# Patient Record
Sex: FEMALE | Race: WHITE | Employment: FULL TIME | URBAN - METROPOLITAN AREA
[De-identification: names, ages, dates, MRNs, and addresses within clinical notes are randomized per-mention and may not be internally consistent; named-entity substitution may affect disease eponyms.]

---

## 2017-01-21 ENCOUNTER — HOSPITAL ENCOUNTER (OUTPATIENT)
Dept: CT IMAGING | Age: 37
Discharge: HOME | End: 2017-01-21
Attending: OTOLARYNGOLOGY
Payer: COMMERCIAL

## 2017-01-21 DIAGNOSIS — J32.8 OTHER CHRONIC SINUSITIS: ICD-10-CM

## 2017-01-21 PROCEDURE — 70486 CT MAXILLOFACIAL W/O DYE: CPT

## 2017-01-28 ENCOUNTER — PATIENT MESSAGE (OUTPATIENT)
Dept: FAMILY MEDICINE CLINIC | Facility: CLINIC | Age: 37
End: 2017-01-28

## 2017-01-30 NOTE — TELEPHONE ENCOUNTER
From: Kerry Nelson  To: Charlene Sanchez MD  Sent: 1/28/2017 11:44 AM CST  Subject: Non-Urgent Medical Question    I'd like to schedule an appointment. Dr. Thai Vallejo next available appointment is 2/16. Is there anything sooner?  Preferably after 4 pm or a Sa

## 2017-02-02 ENCOUNTER — OFFICE VISIT (OUTPATIENT)
Dept: FAMILY MEDICINE CLINIC | Facility: CLINIC | Age: 37
End: 2017-02-02

## 2017-02-02 VITALS
HEIGHT: 67 IN | BODY MASS INDEX: 34.16 KG/M2 | DIASTOLIC BLOOD PRESSURE: 88 MMHG | RESPIRATION RATE: 16 BRPM | SYSTOLIC BLOOD PRESSURE: 130 MMHG | TEMPERATURE: 97 F | HEART RATE: 72 BPM | WEIGHT: 217.63 LBS

## 2017-02-02 DIAGNOSIS — E03.1 CONGENITAL HYPOTHYROIDISM WITHOUT GOITER: Chronic | ICD-10-CM

## 2017-02-02 DIAGNOSIS — J32.4 CHRONIC PANSINUSITIS: ICD-10-CM

## 2017-02-02 DIAGNOSIS — L29.9 ITCHING: ICD-10-CM

## 2017-02-02 DIAGNOSIS — H65.03 BILATERAL ACUTE SEROUS OTITIS MEDIA, RECURRENCE NOT SPECIFIED: Primary | ICD-10-CM

## 2017-02-02 DIAGNOSIS — Z00.00 LABORATORY EXAMINATION ORDERED AS PART OF A ROUTINE GENERAL MEDICAL EXAMINATION: ICD-10-CM

## 2017-02-02 PROCEDURE — 99214 OFFICE O/P EST MOD 30 MIN: CPT | Performed by: INTERNAL MEDICINE

## 2017-02-02 RX ORDER — AMOXICILLIN AND CLAVULANATE POTASSIUM 875; 125 MG/1; MG/1
1 TABLET, FILM COATED ORAL 2 TIMES DAILY
Qty: 20 TABLET | Refills: 0 | Status: SHIPPED | OUTPATIENT
Start: 2017-02-02 | End: 2017-02-12

## 2017-02-02 NOTE — PROGRESS NOTES
CC: Patient presents with: Follow - Up: sinus issues, went ENT ordered test, all are WNL. Not better. patient is on Sudafed       HPI:     Having a lot of sinus congestion.    Having a lot of itching through the legs and has been itching to the point t breath   CARDIOVASCULAR: denies chest pain  GI: denies abdominal pain    EXAM:   /88 mmHg  Pulse 72  Temp(Src) 97 °F (36.1 °C) (Temporal)  Resp 16  Ht 67\"  Wt 217 lb 9.6 oz  BMI 34.07 kg/m2  LMP 01/30/2017  GENERAL: well developed, well nourished,in epistaxis with chronic pseudphed use now having itching over bilateral legs  -refer to allergy     Congenital hypothyroidism without goiter  -repeat tsh and t4    Laboratory examination ordered as part of a routine general medical examination  -screening l

## 2017-02-06 ENCOUNTER — LAB ENCOUNTER (OUTPATIENT)
Dept: LAB | Age: 37
End: 2017-02-06
Attending: INTERNAL MEDICINE
Payer: COMMERCIAL

## 2017-02-06 DIAGNOSIS — Z00.00 LABORATORY EXAMINATION ORDERED AS PART OF A ROUTINE GENERAL MEDICAL EXAMINATION: ICD-10-CM

## 2017-02-06 DIAGNOSIS — E03.1 CONGENITAL HYPOTHYROIDISM WITHOUT GOITER: ICD-10-CM

## 2017-02-06 DIAGNOSIS — L29.9 ITCHING: ICD-10-CM

## 2017-02-06 LAB
25-HYDROXYVITAMIN D (TOTAL): 24.2 NG/ML (ref 30–100)
ALBUMIN SERPL-MCNC: 3.9 G/DL (ref 3.5–4.8)
ALP LIVER SERPL-CCNC: 98 U/L (ref 37–98)
ALT SERPL-CCNC: 33 U/L (ref 14–54)
AST SERPL-CCNC: 21 U/L (ref 15–41)
BASOPHILS # BLD AUTO: 0 X10(3) UL (ref 0–0.1)
BASOPHILS NFR BLD AUTO: 0 %
BILIRUB SERPL-MCNC: 0.2 MG/DL (ref 0.1–2)
BUN BLD-MCNC: 9 MG/DL (ref 8–20)
CALCIUM BLD-MCNC: 9.6 MG/DL (ref 8.3–10.3)
CHLORIDE: 104 MMOL/L (ref 101–111)
CO2: 27 MMOL/L (ref 22–32)
CREAT BLD-MCNC: 0.87 MG/DL (ref 0.55–1.02)
EOSINOPHIL # BLD AUTO: 0 X10(3) UL (ref 0–0.3)
EOSINOPHIL NFR BLD AUTO: 0 %
ERYTHROCYTE [DISTWIDTH] IN BLOOD BY AUTOMATED COUNT: 12.7 % (ref 11.5–16)
FREE T4: 1.3 NG/DL (ref 0.9–1.8)
GLUCOSE BLD-MCNC: 67 MG/DL (ref 70–99)
HAV AB SERPL IA-ACNC: 647 PG/ML (ref 193–986)
HCT VFR BLD AUTO: 43.2 % (ref 34–50)
HGB BLD-MCNC: 14.4 G/DL (ref 12–16)
IMMATURE GRANULOCYTE COUNT: 0.01 X10(3) UL (ref 0–1)
IMMATURE GRANULOCYTE RATIO %: 0.2 %
LYMPHOCYTES # BLD AUTO: 1.97 X10(3) UL (ref 0.9–4)
LYMPHOCYTES NFR BLD AUTO: 37.3 %
M PROTEIN MFR SERPL ELPH: 7.5 G/DL (ref 6.1–8.3)
MCH RBC QN AUTO: 30.1 PG (ref 27–33.2)
MCHC RBC AUTO-ENTMCNC: 33.3 G/DL (ref 31–37)
MCV RBC AUTO: 90.2 FL (ref 81–100)
MONOCYTES # BLD AUTO: 0.5 X10(3) UL (ref 0.1–0.6)
MONOCYTES NFR BLD AUTO: 9.5 %
NEUTROPHIL ABS PRELIM: 2.8 X10 (3) UL (ref 1.3–6.7)
NEUTROPHILS # BLD AUTO: 2.8 X10(3) UL (ref 1.3–6.7)
NEUTROPHILS NFR BLD AUTO: 53 %
PLATELET # BLD AUTO: 275 10(3)UL (ref 150–450)
POTASSIUM SERPL-SCNC: 4.2 MMOL/L (ref 3.6–5.1)
RBC # BLD AUTO: 4.79 X10(6)UL (ref 3.8–5.1)
RED CELL DISTRIBUTION WIDTH-SD: 41.9 FL (ref 35.1–46.3)
SODIUM SERPL-SCNC: 139 MMOL/L (ref 136–144)
TSI SER-ACNC: 0.75 MIU/ML (ref 0.35–5.5)
WBC # BLD AUTO: 5.3 X10(3) UL (ref 4–13)

## 2017-02-06 PROCEDURE — 36415 COLL VENOUS BLD VENIPUNCTURE: CPT

## 2017-02-06 PROCEDURE — 82607 VITAMIN B-12: CPT

## 2017-02-06 PROCEDURE — 84443 ASSAY THYROID STIM HORMONE: CPT

## 2017-02-06 PROCEDURE — 85025 COMPLETE CBC W/AUTO DIFF WBC: CPT

## 2017-02-06 PROCEDURE — 84439 ASSAY OF FREE THYROXINE: CPT

## 2017-02-06 PROCEDURE — 82306 VITAMIN D 25 HYDROXY: CPT

## 2017-02-06 PROCEDURE — 80053 COMPREHEN METABOLIC PANEL: CPT

## 2017-02-14 RX ORDER — ERGOCALCIFEROL 1.25 MG/1
50000 CAPSULE ORAL WEEKLY
Qty: 12 CAPSULE | Refills: 0 | Status: SHIPPED | OUTPATIENT
Start: 2017-02-14 | End: 2017-05-03

## 2017-02-14 RX ORDER — LEVOTHYROXINE SODIUM 175 UG/1
175 TABLET ORAL
Qty: 90 TABLET | Refills: 1 | Status: SHIPPED | OUTPATIENT
Start: 2017-02-14 | End: 2017-08-13

## 2017-03-31 ENCOUNTER — HOSPITAL ENCOUNTER (OUTPATIENT)
Dept: GENERAL RADIOLOGY | Age: 37
Discharge: HOME | End: 2017-03-31
Attending: PHYSICIAN ASSISTANT
Payer: COMMERCIAL

## 2017-03-31 ENCOUNTER — OFFICE VISIT (OUTPATIENT)
Dept: FAMILY MEDICINE CLINIC | Facility: CLINIC | Age: 37
End: 2017-03-31

## 2017-03-31 VITALS
BODY MASS INDEX: 34.53 KG/M2 | DIASTOLIC BLOOD PRESSURE: 80 MMHG | HEART RATE: 74 BPM | WEIGHT: 220 LBS | HEIGHT: 67 IN | SYSTOLIC BLOOD PRESSURE: 128 MMHG | TEMPERATURE: 98 F | RESPIRATION RATE: 16 BRPM

## 2017-03-31 DIAGNOSIS — M25.532 LEFT WRIST PAIN: Primary | ICD-10-CM

## 2017-03-31 DIAGNOSIS — M25.532 LEFT WRIST PAIN: ICD-10-CM

## 2017-03-31 PROCEDURE — 99213 OFFICE O/P EST LOW 20 MIN: CPT | Performed by: PHYSICIAN ASSISTANT

## 2017-03-31 PROCEDURE — 73110 X-RAY EXAM OF WRIST: CPT

## 2017-03-31 RX ORDER — METHYLPREDNISOLONE 4 MG/1
TABLET ORAL
Qty: 1 KIT | Refills: 0 | Status: SHIPPED | OUTPATIENT
Start: 2017-03-31 | End: 2017-07-09

## 2017-03-31 NOTE — PROGRESS NOTES
[facility] Internal Medicine Progress Note    CC:  Patient presents with:  Wrist Pain: L wrist, intermittent pain for 9 months      HPI:   HPI  L wrist pain x 9 months  She states last 4th of July she banged it into the kitchen chair  She didn't Constitutional: She is oriented to person, place, and time and well-developed, well-nourished, and in no distress. HENT:   Mouth/Throat: Oropharynx is clear and moist. No oropharyngeal exudate.    Cardiovascular: Normal rate, regular rhythm and normal hea

## 2017-03-31 NOTE — PATIENT INSTRUCTIONS
Thank you for choosing Teresa Moran PA-C at [place] [address]  To Do: Serina Mejia  1. Pt to begin medications as prescribed  2. Pt to purchase a thumb spica wrist splint  3. Pt to get xrays  4.  Pt to follow-up with ortho, referral given those potential risks and we strive to make you healthier and to improve your quality of life.     Referrals, and Radiology Information:    If your insurance requires a referral to a specialist, please allow 5 business days to process your referral request.

## 2017-04-02 NOTE — PROGRESS NOTES
Quick Note:    Borderline widening of bone spaces where pt is having pain, possibly due to old injury  Referral already given for ortho, pt should follow-up with ortho  ______

## 2017-04-04 PROBLEM — M65.4 DE QUERVAIN'S TENOSYNOVITIS, LEFT: Status: ACTIVE | Noted: 2017-04-04

## 2017-07-09 ENCOUNTER — OFFICE VISIT (OUTPATIENT)
Dept: FAMILY MEDICINE CLINIC | Facility: CLINIC | Age: 37
End: 2017-07-09

## 2017-07-09 VITALS
RESPIRATION RATE: 18 BRPM | TEMPERATURE: 98 F | SYSTOLIC BLOOD PRESSURE: 120 MMHG | HEIGHT: 67 IN | WEIGHT: 224 LBS | BODY MASS INDEX: 35.16 KG/M2 | HEART RATE: 86 BPM | DIASTOLIC BLOOD PRESSURE: 80 MMHG | OXYGEN SATURATION: 98 %

## 2017-07-09 DIAGNOSIS — T70.0XXA EAR BAROTRAUMA, INITIAL ENCOUNTER: Primary | ICD-10-CM

## 2017-07-09 PROCEDURE — 99213 OFFICE O/P EST LOW 20 MIN: CPT | Performed by: NURSE PRACTITIONER

## 2017-07-09 RX ORDER — NEOMYCIN SULFATE, POLYMYXIN B SULFATE, HYDROCORTISONE 3.5; 10000; 1 MG/ML; [USP'U]/ML; MG/ML
4 SOLUTION/ DROPS AURICULAR (OTIC) 4 TIMES DAILY
Qty: 1 BOTTLE | Refills: 0 | Status: SHIPPED | OUTPATIENT
Start: 2017-07-09 | End: 2017-07-16

## 2017-07-09 RX ORDER — AMOXICILLIN AND CLAVULANATE POTASSIUM 875; 125 MG/1; MG/1
1 TABLET, FILM COATED ORAL 2 TIMES DAILY
Qty: 20 TABLET | Refills: 0 | Status: SHIPPED | OUTPATIENT
Start: 2017-07-09 | End: 2017-07-17 | Stop reason: ALTCHOICE

## 2017-07-09 NOTE — PROGRESS NOTES
CHIEF COMPLAINT:   Patient presents with:  Ear Pain: Left ear pain down towards jaw, sinus pressure started yesterday      HPI:   Mary Alvarez is a 31year old female who presents to clinic today with complaints of left ear pain.  Has had for 1  da REVIEW OF SYSTEMS:   GENERAL: Feeling well otherwise. SKIN: no unusual skin lesions or rashes  HEENT: See HPI  LUNGS: No cough, shortness of breath, or wheezing. CARDIOVASCULAR: No chest pain, palpitations  GI: No N/V/C/D.   NEURO: denies headaches or d Risk and benefits of medication discussed. Stressed importance of completing full course of antibiotic. Tylenol/Motrin prn pain. Call or return if s/sx worsen, do not improve in 3 days, or if fever of 100.4 or greater persists for 72 hours.     Adriana Mascorro Symptoms can be mild to severe.  The most common symptoms of ear barotrauma may include:  · Feeling of pressure in the ear  · Ear pain  · Dizziness  · Feeling like you have a blocked ear  · Bleeding from the ears or into the middle ear  · Ringing in your ea

## 2017-07-09 NOTE — PATIENT INSTRUCTIONS
Understanding Ear Barotrauma  Ear barotrauma is a type of ear damage. It is caused by a difference in pressure between the inside of the ear and the air around you. It can cause pain and may lead to lasting hearing loss. It can harm the eardrum.  The eard Some events that cause ear barotrauma may also harm the lungs and sinuses. This can cause symptoms such as facial pain or shortness of breath. Diagnosing barotrauma  Your healthcare provider will ask about your health history and your symptoms.  You’ll be

## 2017-07-14 ENCOUNTER — OFFICE VISIT (OUTPATIENT)
Dept: FAMILY MEDICINE CLINIC | Facility: CLINIC | Age: 37
End: 2017-07-14

## 2017-07-14 VITALS
SYSTOLIC BLOOD PRESSURE: 130 MMHG | DIASTOLIC BLOOD PRESSURE: 88 MMHG | TEMPERATURE: 98 F | RESPIRATION RATE: 16 BRPM | HEIGHT: 67 IN | WEIGHT: 222 LBS | BODY MASS INDEX: 34.84 KG/M2 | HEART RATE: 84 BPM

## 2017-07-14 DIAGNOSIS — T70.29XD BAROTRAUMA, SUBSEQUENT ENCOUNTER: ICD-10-CM

## 2017-07-14 DIAGNOSIS — H92.02 EAR PAIN, LEFT: Primary | ICD-10-CM

## 2017-07-14 PROCEDURE — 99213 OFFICE O/P EST LOW 20 MIN: CPT | Performed by: NURSE PRACTITIONER

## 2017-07-14 RX ORDER — CEFDINIR 300 MG/1
300 CAPSULE ORAL 2 TIMES DAILY
Qty: 20 CAPSULE | Refills: 0 | Status: SHIPPED | OUTPATIENT
Start: 2017-07-14 | End: 2017-07-24

## 2017-07-14 NOTE — PROGRESS NOTES
[facility] Internal Medicine Office Note  Chief Complaint:   Patient presents with:  Ear Pain: from immediate care on 7/9 for left ear pain barotrauma, still in alot of pain     HPI:   This is a 31year old female coming in for  HPI     Was seen Osmotic Release (NIFEDICAL XL) 30 MG Oral Tablet 24 Hr Take 1 tablet (30 mg total) by mouth once daily. Disp: 90 tablet Rfl: 3         REVIEW OF SYSTEMS:   Review of Systems   Constitutional: Negative for chills, fatigue and fever.    HENT: Positive for con diagnosis)  Barotrauma, subsequent encounter    The plan is as follows  Ruiz Schumacher was seen today for ear pain.     Diagnoses and all orders for this visit:    Ear pain, left; Barotrauma, subsequent encounter  -     ENT - INTERNAL  -     cefdinir 300 MG Oral

## 2017-07-14 NOTE — PATIENT INSTRUCTIONS
Thank you for choosing [facility] V[facility] at [place] [address]  To Do: Luis Ramirez  1. Stop taking amoxicillin-clavu. and we are going to change to different abx   2.  Make appt with Dr. Seth Renee (ENT specialist)     Effective 6/19/17 until Nov discussed today.  All therapies have potential risk of harm or side effects or medication interactions.  It is your duty and for your safety to discuss with the pharmacist and our office with questions, and to notify us and stop treatment if problems arise

## 2017-07-19 RX ORDER — FLUTICASONE PROPIONATE 50 MCG
SPRAY, SUSPENSION (ML) NASAL
Qty: 1 BOTTLE | Refills: 2 | Status: SHIPPED | OUTPATIENT
Start: 2017-07-19 | End: 2017-08-30 | Stop reason: ALTCHOICE

## 2017-07-19 NOTE — TELEPHONE ENCOUNTER
Requesting Fluticason 50 mcg  LOV: 7/14/17 acute, 2/2/17   RTC: Return if symptoms worsen or fail to improve. Last Labs: n.a  Filled: 4/18/16 #1 btle with 3 refills    No future appointments.     Time started: 4:14 pm    Time ended: 4:16 pm    Total time

## 2017-08-15 RX ORDER — LEVOTHYROXINE SODIUM 175 UG/1
TABLET ORAL
Qty: 90 TABLET | Refills: 0 | Status: SHIPPED | OUTPATIENT
Start: 2017-08-15 | End: 2017-11-10

## 2017-08-15 NOTE — TELEPHONE ENCOUNTER
Requesting Levothyroxine 175mcg  LOV: 7/14/17  RTC: not noted  Last Labs: 2/6/17  Filled: 2/14/17 #90 with 1 refills    No future appointments.     Med passes protocol - refilled  Time started: 503    Time ended: 505    Total time spent on chart: 2min

## 2017-08-23 NOTE — TELEPHONE ENCOUNTER
Requesting Nifedical   LOV: 7/14/17 (acute)  Last NA: 7/9/16  RTC: 6 mo   Last Labs: pp  Filled: 8/22/16 #90 with 3 refills    No future appointments. Patient overdue for appt re: blood pressure. Can we reach out to patient to schedule?      Time started

## 2017-08-27 ENCOUNTER — APPOINTMENT (OUTPATIENT)
Dept: GENERAL RADIOLOGY | Age: 37
End: 2017-08-27
Attending: FAMILY MEDICINE
Payer: COMMERCIAL

## 2017-08-27 ENCOUNTER — HOSPITAL ENCOUNTER (OUTPATIENT)
Age: 37
Discharge: HOME | End: 2017-08-27
Attending: FAMILY MEDICINE
Payer: COMMERCIAL

## 2017-08-27 VITALS
DIASTOLIC BLOOD PRESSURE: 98 MMHG | RESPIRATION RATE: 18 BRPM | SYSTOLIC BLOOD PRESSURE: 134 MMHG | OXYGEN SATURATION: 99 % | HEART RATE: 101 BPM | TEMPERATURE: 98 F

## 2017-08-27 DIAGNOSIS — M54.12 CERVICAL RADICULAR PAIN: Primary | ICD-10-CM

## 2017-08-27 PROCEDURE — 96372 THER/PROPH/DIAG INJ SC/IM: CPT

## 2017-08-27 PROCEDURE — 99214 OFFICE O/P EST MOD 30 MIN: CPT

## 2017-08-27 PROCEDURE — 99204 OFFICE O/P NEW MOD 45 MIN: CPT

## 2017-08-27 PROCEDURE — 72050 X-RAY EXAM NECK SPINE 4/5VWS: CPT | Performed by: FAMILY MEDICINE

## 2017-08-27 RX ORDER — METHYLPREDNISOLONE 4 MG/1
TABLET ORAL
Qty: 1 PACKAGE | Refills: 0 | Status: SHIPPED | OUTPATIENT
Start: 2017-08-27 | End: 2017-11-20 | Stop reason: ALTCHOICE

## 2017-08-27 RX ORDER — CYCLOBENZAPRINE HCL 10 MG
10 TABLET ORAL 3 TIMES DAILY PRN
Qty: 15 TABLET | Refills: 0 | Status: SHIPPED | OUTPATIENT
Start: 2017-08-27 | End: 2017-09-03

## 2017-08-27 RX ORDER — HYDROCODONE BITARTRATE AND ACETAMINOPHEN 5; 325 MG/1; MG/1
1 TABLET ORAL EVERY 6 HOURS PRN
Qty: 10 TABLET | Refills: 0 | Status: SHIPPED | OUTPATIENT
Start: 2017-08-27 | End: 2017-08-30

## 2017-08-27 RX ORDER — KETOROLAC TROMETHAMINE 30 MG/ML
60 INJECTION, SOLUTION INTRAMUSCULAR; INTRAVENOUS ONCE
Status: COMPLETED | OUTPATIENT
Start: 2017-08-27 | End: 2017-08-27

## 2017-08-27 RX ORDER — NYSTATIN 100000 U/G
1 CREAM TOPICAL 2 TIMES DAILY
Qty: 15 G | Refills: 0 | Status: SHIPPED | OUTPATIENT
Start: 2017-08-27 | End: 2017-08-28

## 2017-08-27 NOTE — ED INITIAL ASSESSMENT (HPI)
C/O left sided neck pain that radiates down left arm that started 2 days ago. Gets some relief from OTC Aleve.

## 2017-08-28 ENCOUNTER — TELEPHONE (OUTPATIENT)
Dept: FAMILY MEDICINE CLINIC | Facility: CLINIC | Age: 37
End: 2017-08-28

## 2017-08-28 DIAGNOSIS — M54.2 NECK PAIN: Primary | ICD-10-CM

## 2017-08-28 NOTE — TELEPHONE ENCOUNTER
Requesting referral to [facility] -Dr Frankey Bjork for neck pain  LOV: 7/14/17 acute, 02/02/17   RTC: return if symptoms worsen  Referral pended if okay    Patient transferred to the  to schedule OV due to being overdue for appt for

## 2017-08-28 NOTE — ED PROVIDER NOTES
Patient Seen in: [facility]    History   Patient presents with:  Neck Pain (musculoskeletal, neurologic)    Stated Complaint: neck pain, 3days    HPI    31year old female presents for neck pain.  States she has left sided neck p Breast       Smoking status: Never Smoker                                                              Alcohol use: No                Review of Systems    Positive for stated complaint: neck pain, 3days  Other systems are as noted in HPI.   Constitutional a are patent bilaterally. No prevertebral soft tissue swelling. Incidentally noted are cervical ribs.  Advised to apply heat packs  Medrol dose pack and Flexeril as prescribed  Norco as needed for severe pain  Follow up with PCP if not better           Dispos

## 2017-08-28 NOTE — TELEPHONE ENCOUNTER
.Reason for the order/referral: referral to chiropractor   PCP: Paola@[employer] Dr. Tono Patel   Refer to Provider (first and last name) seeking chiropractor   Specialty: rehab   Patient Insurance: Payor: Dyllan Manzanares / [facility] / Product Type: HMO /

## 2017-08-30 ENCOUNTER — OFFICE VISIT (OUTPATIENT)
Dept: FAMILY MEDICINE CLINIC | Facility: CLINIC | Age: 37
End: 2017-08-30

## 2017-08-30 VITALS
SYSTOLIC BLOOD PRESSURE: 130 MMHG | RESPIRATION RATE: 16 BRPM | BODY MASS INDEX: 34.69 KG/M2 | TEMPERATURE: 98 F | DIASTOLIC BLOOD PRESSURE: 90 MMHG | HEIGHT: 67 IN | HEART RATE: 80 BPM | WEIGHT: 221 LBS

## 2017-08-30 DIAGNOSIS — I10 HYPERTENSION, UNSPECIFIED TYPE: Primary | ICD-10-CM

## 2017-08-30 DIAGNOSIS — E03.1 CONGENITAL HYPOTHYROIDISM WITHOUT GOITER: Chronic | ICD-10-CM

## 2017-08-30 DIAGNOSIS — E55.9 VITAMIN D DEFICIENCY: ICD-10-CM

## 2017-08-30 PROCEDURE — 99213 OFFICE O/P EST LOW 20 MIN: CPT | Performed by: NURSE PRACTITIONER

## 2017-08-30 RX ORDER — NIFEDIPINE 30 MG/1
TABLET, EXTENDED RELEASE ORAL
Qty: 90 TABLET | Refills: 1 | Status: SHIPPED | OUTPATIENT
Start: 2017-08-30 | End: 2018-02-16

## 2017-08-30 NOTE — PROGRESS NOTES
[facility] Internal Medicine Office Note  Chief Complaint:   Patient presents with:  Blood Pressure: ran out of med yesterday.   Medication Request: Nifedical refill    HPI:   This is a 31year old female coming in for  HPI  Here today for BP medi Respiratory: Negative for cough and shortness of breath. Cardiovascular: Negative for chest pain. Gastrointestinal: Negative for abdominal pain. Genitourinary: Negative. Musculoskeletal: Negative. Skin: Negative.     Neurological: Negative fo DAILY    Congenital hypothyroidism without goiter  Vitamin D deficiency  -     TSH W REFLEX TO FREE T4; Future  -     VITAMIN D, 25-HYDROXY; Future    1.labs today    Orders Placed This Encounter      Vitamin D, 25-Hydroxy      TSH W REFLEX TO FREE T4

## 2017-08-30 NOTE — PATIENT INSTRUCTIONS
Thank you for choosing [facility] Ni at [place] [address]  To Do: Mercy Elaine  1. Refill given  2. Can get labs done during the next couple weeks     Effective 6/19/17 until November 2017  Due to [employer] is being moved. or side effects or medication interactions.  It is your duty and for your safety to discuss with the pharmacist and our office with questions, and to notify us and stop treatment if problems arise, but know that our intention is that the benefits outweigh

## 2017-08-31 ENCOUNTER — TELEPHONE (OUTPATIENT)
Dept: FAMILY MEDICINE CLINIC | Facility: CLINIC | Age: 37
End: 2017-08-31

## 2017-08-31 DIAGNOSIS — M54.2 CERVICALGIA: ICD-10-CM

## 2017-08-31 DIAGNOSIS — M54.2 NECK PAIN: Primary | ICD-10-CM

## 2017-08-31 NOTE — TELEPHONE ENCOUNTER
Hello,       This provider is not in network. Member should be utilizing in network providers. If member has seen in network provider, ALL referrals require clinical supporting requests. ALL out of network requests require medical necessity.  Please be advi

## 2017-09-01 NOTE — TELEPHONE ENCOUNTER
Patient states that she contacted [facility] and they told her that they can not disclose that information, states that recommendation comes from the doctor.      Pt would like for Dr Joanne Rodriguez to make a recommendation for a chiropractor, pt states she saw Sheila tristan

## 2017-09-01 NOTE — TELEPHONE ENCOUNTER
Patient returned call and was informed of this. She was given number to [facility] to contact and find out who is in plan and she will call us with name to pend order.     Time started: 330    Time ended: 331    Total time spent on chart: one min

## 2017-09-05 NOTE — TELEPHONE ENCOUNTER
Time started: 3:05pm     Time ended: 3:07pm    Total time spent on chart: 2 min    Patient aware that [facility] is only allowing PT at this time. Ordered entered for PT and Polina Yanez PT number given for patient to schedule.

## 2017-09-05 NOTE — TELEPHONE ENCOUNTER
LMOM for patient to call back and discuss.      Time started: 1502    Time ended: 1423    Total time spent on chart: 1 min

## 2017-10-31 ENCOUNTER — OFFICE VISIT (OUTPATIENT)
Dept: FAMILY MEDICINE CLINIC | Facility: CLINIC | Age: 37
End: 2017-10-31

## 2017-10-31 VITALS
HEART RATE: 89 BPM | TEMPERATURE: 99 F | BODY MASS INDEX: 34.37 KG/M2 | DIASTOLIC BLOOD PRESSURE: 88 MMHG | WEIGHT: 219 LBS | OXYGEN SATURATION: 98 % | HEIGHT: 67 IN | RESPIRATION RATE: 16 BRPM | SYSTOLIC BLOOD PRESSURE: 128 MMHG

## 2017-10-31 DIAGNOSIS — R51.9 SINUS HEADACHE: ICD-10-CM

## 2017-10-31 DIAGNOSIS — J01.00 ACUTE MAXILLARY SINUSITIS, RECURRENCE NOT SPECIFIED: Primary | ICD-10-CM

## 2017-10-31 PROCEDURE — 99213 OFFICE O/P EST LOW 20 MIN: CPT | Performed by: NURSE PRACTITIONER

## 2017-10-31 RX ORDER — FLUTICASONE PROPIONATE 50 MCG
2 SPRAY, SUSPENSION (ML) NASAL DAILY
Qty: 1 BOTTLE | Refills: 3 | Status: SHIPPED | OUTPATIENT
Start: 2017-10-31 | End: 2018-03-30

## 2017-10-31 RX ORDER — AMOXICILLIN AND CLAVULANATE POTASSIUM 875; 125 MG/1; MG/1
1 TABLET, FILM COATED ORAL 2 TIMES DAILY
Qty: 20 TABLET | Refills: 0 | Status: SHIPPED | OUTPATIENT
Start: 2017-10-31 | End: 2017-11-10

## 2017-10-31 NOTE — PATIENT INSTRUCTIONS
Sinus Headaches     When using nasal spray, keep your chin down and angle the spray away from center. Sinus headaches can cause a gnawing pain behind the nose and eyes. The pain most often gets worse in the afternoon and evening.  You may also run a f Cilia keep sinuses clear    Air circulates freely though healthy sinuses. Tiny, hairlike structures called cilia line the sinuses. Cilia move the thin, watery mucus through the sinuses and into the nose. Sinuses are healthy when they drain freely.  Sinus dr · Over-the-counter decongestants may be used unless a similar medicine was prescribed. Nasal sprays work the fastest. Use one that contains phenylephrine or oxymetazoline. First blow the nose gently. Then use the spray.  Do not use these medicines more ofte © 8113-6687 [address]. [address] [facility] [place]. All rights reserved. This information is not intended as a substitute for professional medical care. Always follow your healthcare professional's instructions.

## 2017-10-31 NOTE — PROGRESS NOTES
Aureliano Bingham is a 31year old female. Patient presents with:  Cold: congestion,sinus pressure,headache,cough sx x 3 days.      HPI:    Symptoms started  3-4 days ago with purulent nasal discharge, maxillary sinus pressure, and headache, a lot of 128/88 (BP Location: Right arm, Patient Position: Sitting, Cuff Size: large)   Pulse 89   Temp 98.6 °F (37 °C) (Oral)   Resp 16   Ht 67\"   Wt 219 lb   LMP 10/10/2017   SpO2 98%   BMI 34.30 kg/m²   General: WD/WN in no acute distress.    Eyes & ears: conjun

## 2017-11-13 RX ORDER — ESTAZOLAM 2 MG/1
TABLET ORAL
Qty: 63 TABLET | Refills: 2 | Status: SHIPPED | OUTPATIENT
Start: 2017-11-13 | End: 2018-03-30 | Stop reason: SINTOL

## 2017-11-13 RX ORDER — LEVOTHYROXINE SODIUM 175 UG/1
TABLET ORAL
Qty: 90 TABLET | Refills: 0 | Status: SHIPPED | OUTPATIENT
Start: 2017-11-13 | End: 2018-02-16

## 2017-11-13 NOTE — TELEPHONE ENCOUNTER
Requesting Levothyroxine 175mcg daily  LOV: 8/30/17  RTC: 6 months  Last Relevant Labs: 2/6/17  Filled: 8/15/17 #90 with 0 refills    Future Appointments  Date Time Provider Sidney Nereida   12/12/2017 5:00 PM MD MYLES Benítez [facility]   1/5

## 2017-11-21 PROBLEM — F33.2 SEVERE RECURRENT MAJOR DEPRESSION WITHOUT PSYCHOTIC FEATURES (HCC): Status: ACTIVE | Noted: 2017-11-21

## 2017-11-28 PROBLEM — F41.1 GAD (GENERALIZED ANXIETY DISORDER): Status: ACTIVE | Noted: 2017-11-28

## 2018-02-15 ENCOUNTER — PATIENT MESSAGE (OUTPATIENT)
Dept: INTERNAL MEDICINE CLINIC | Facility: CLINIC | Age: 38
End: 2018-02-15

## 2018-02-15 RX ORDER — LEVOTHYROXINE SODIUM 175 UG/1
TABLET ORAL
Qty: 90 TABLET | Refills: 0 | OUTPATIENT
Start: 2018-02-15

## 2018-02-15 NOTE — TELEPHONE ENCOUNTER
From: Ayaan Gaffney  To: Jessie Carvalho MD  Sent: 2/15/2018 12:17 PM CST  Subject: Prescription Question    I understand that Dr. Tirso Lacy has left the practice. I'm in the process of finding a new primary care doctor.  However, I am out of my Synthroid that

## 2018-02-16 ENCOUNTER — OFFICE VISIT (OUTPATIENT)
Dept: FAMILY MEDICINE CLINIC | Facility: CLINIC | Age: 38
End: 2018-02-16

## 2018-02-16 VITALS
WEIGHT: 223 LBS | TEMPERATURE: 98 F | RESPIRATION RATE: 16 BRPM | HEIGHT: 67 IN | DIASTOLIC BLOOD PRESSURE: 76 MMHG | BODY MASS INDEX: 35 KG/M2 | OXYGEN SATURATION: 99 % | HEART RATE: 102 BPM | SYSTOLIC BLOOD PRESSURE: 118 MMHG

## 2018-02-16 DIAGNOSIS — E03.1 CONGENITAL HYPOTHYROIDISM WITHOUT GOITER: Primary | Chronic | ICD-10-CM

## 2018-02-16 DIAGNOSIS — F33.2 SEVERE RECURRENT MAJOR DEPRESSION WITHOUT PSYCHOTIC FEATURES (HCC): ICD-10-CM

## 2018-02-16 DIAGNOSIS — F41.1 GAD (GENERALIZED ANXIETY DISORDER): ICD-10-CM

## 2018-02-16 DIAGNOSIS — I10 ESSENTIAL HYPERTENSION: ICD-10-CM

## 2018-02-16 PROCEDURE — 96127 BRIEF EMOTIONAL/BEHAV ASSMT: CPT | Performed by: PHYSICIAN ASSISTANT

## 2018-02-16 PROCEDURE — 99214 OFFICE O/P EST MOD 30 MIN: CPT | Performed by: PHYSICIAN ASSISTANT

## 2018-02-16 RX ORDER — LEVOTHYROXINE SODIUM 175 UG/1
TABLET ORAL
Qty: 90 TABLET | Refills: 1 | Status: SHIPPED | OUTPATIENT
Start: 2018-02-16 | End: 2018-08-20

## 2018-02-16 RX ORDER — NIFEDIPINE 30 MG/1
TABLET, EXTENDED RELEASE ORAL
Qty: 90 TABLET | Refills: 1 | Status: SHIPPED | OUTPATIENT
Start: 2018-02-16 | End: 2018-03-26

## 2018-02-16 NOTE — PATIENT INSTRUCTIONS
Thank you for choosing Cinthia Riley PA-C at [place] [address]  To Do: Shakira Miranda  1. Pt to continue medications  2. Get lab work done  3.  Follow-up in 6 months    • Please signup for MY CHART, which is electronic access to your record if y please call Central Scheduling at [phone]  Please allow our office 5 business days to contact you regarding any testing results.     Refill policies:   Allow 3 business days for refills; controlled substances may take longer and must be picked up from

## 2018-02-16 NOTE — PROGRESS NOTES
[facility] Internal Medicine Progress Note    CC:  Patient presents with:  Thyroid Problem: Levothyroxine-thyroid labs due  Hypertension: HTN-Nifedipine ER  Depression: Overdue health maintenance- pt does see weekly someone at [address] [place] [address] for dysphoric mood. Negative for self-injury, sleep disturbance and suicidal ideas. The patient is nervous/anxious.          Following with psych         /76 (BP Location: Right arm, Patient Position: Sitting, Cuff Size: adult)   Pulse 102   Temp 98 ° [E]      Comp Metabolic Panel (14) [E]      Lipid Panel [E]      TSH and Free T4 [E]      Vitamin B12 [E]      Vitamin D, 25-Hydroxy [E]    Meds & Refills for this Visit:  Signed Prescriptions Disp Refills    NIFEdipine ER Osmotic Release (NIFEDICAL XL) 30

## 2018-03-01 ENCOUNTER — OFFICE VISIT (OUTPATIENT)
Dept: FAMILY MEDICINE CLINIC | Facility: CLINIC | Age: 38
End: 2018-03-01

## 2018-03-01 VITALS
BODY MASS INDEX: 35 KG/M2 | HEART RATE: 94 BPM | DIASTOLIC BLOOD PRESSURE: 72 MMHG | TEMPERATURE: 98 F | WEIGHT: 223 LBS | OXYGEN SATURATION: 99 % | SYSTOLIC BLOOD PRESSURE: 124 MMHG | RESPIRATION RATE: 20 BRPM

## 2018-03-01 DIAGNOSIS — J01.00 ACUTE MAXILLARY SINUSITIS, RECURRENCE NOT SPECIFIED: Primary | ICD-10-CM

## 2018-03-01 PROCEDURE — 99213 OFFICE O/P EST LOW 20 MIN: CPT | Performed by: NURSE PRACTITIONER

## 2018-03-01 RX ORDER — METHYLPREDNISOLONE 4 MG/1
TABLET ORAL
Qty: 1 KIT | Refills: 0 | Status: SHIPPED | OUTPATIENT
Start: 2018-03-01 | End: 2018-03-07

## 2018-03-01 NOTE — PATIENT INSTRUCTIONS
Humidifier in room  Sleep propped  Push fluids  Limit dairy  Mucinex as directed (does not affect blood pressure)  Benadryl at night  Steroids as prescribed  Follow up in 2 days if no improvement or worsening symptoms    Sinusitis (No Antibiotics)    The · Use acetaminophen or ibuprofen to control pain, unless another pain medicine was prescribed. (If you have chronic liver or kidney disease or ever had a stomach ulcer, talk with your doctor before using these medicines.  Aspirin should never be used in any

## 2018-03-01 NOTE — PROGRESS NOTES
CHIEF COMPLAINT:   Patient presents with:  Nasal Congestion: sinus pressure,post nasal drip and sore throat x 7 days      HPI:   Sona Bailey is a 30year old female who presents for cold symptoms for  7  days.  Symptoms have progressed into sinus co Smoking status: Never Smoker                                                              Smokeless tobacco: Never Used                      Alcohol use: Yes           0.0 oz/week     Comment: very rare        REVIEW OF SYSTEMS:   GENERAL:  denies  diminis methylPREDNISolone (MEDROL) 4 MG Oral Tablet Therapy Pack 1 kit 0      Sig: As directed. Risks, benefits, side effects of medication addressed and explained.     Patient Instructions     Humidifier in room  Sleep propped  Push fluids  Limit dairy · Over-the-counter decongestants may be used unless a similar medicine was prescribed. Nasal sprays work the fastest. Use one that contains phenylephrine or oxymetazoline. First blow the nose gently. Then use the spray.  Do not use these medicines more ofte © 5811-9392 [address]. 140[address] [facility] [place]. All rights reserved. This information is not intended as a substitute for professional medical care. Always follow your healthcare professional's instructions.             The

## 2018-03-06 ENCOUNTER — APPOINTMENT (OUTPATIENT)
Dept: CT IMAGING | Age: 38
End: 2018-03-06
Attending: EMERGENCY MEDICINE
Payer: COMMERCIAL

## 2018-03-06 ENCOUNTER — HOSPITAL ENCOUNTER (EMERGENCY)
Age: 38
Discharge: HOME | End: 2018-03-06
Attending: EMERGENCY MEDICINE
Payer: COMMERCIAL

## 2018-03-06 VITALS
SYSTOLIC BLOOD PRESSURE: 140 MMHG | HEIGHT: 67 IN | RESPIRATION RATE: 16 BRPM | HEART RATE: 84 BPM | WEIGHT: 220 LBS | DIASTOLIC BLOOD PRESSURE: 96 MMHG | BODY MASS INDEX: 34.53 KG/M2 | TEMPERATURE: 98 F | OXYGEN SATURATION: 98 %

## 2018-03-06 DIAGNOSIS — G43.809 OTHER MIGRAINE WITHOUT STATUS MIGRAINOSUS, NOT INTRACTABLE: Primary | ICD-10-CM

## 2018-03-06 PROCEDURE — 70450 CT HEAD/BRAIN W/O DYE: CPT | Performed by: EMERGENCY MEDICINE

## 2018-03-06 PROCEDURE — 96375 TX/PRO/DX INJ NEW DRUG ADDON: CPT

## 2018-03-06 PROCEDURE — 99284 EMERGENCY DEPT VISIT MOD MDM: CPT

## 2018-03-06 PROCEDURE — 96361 HYDRATE IV INFUSION ADD-ON: CPT

## 2018-03-06 PROCEDURE — 96374 THER/PROPH/DIAG INJ IV PUSH: CPT

## 2018-03-06 RX ORDER — SODIUM CHLORIDE 9 MG/ML
INJECTION, SOLUTION INTRAVENOUS ONCE
Status: COMPLETED | OUTPATIENT
Start: 2018-03-06 | End: 2018-03-06

## 2018-03-06 RX ORDER — HYDROMORPHONE HYDROCHLORIDE 1 MG/ML
0.5 INJECTION, SOLUTION INTRAMUSCULAR; INTRAVENOUS; SUBCUTANEOUS EVERY 30 MIN PRN
Status: DISCONTINUED | OUTPATIENT
Start: 2018-03-06 | End: 2018-03-06

## 2018-03-06 RX ORDER — BUTALBITAL, ACETAMINOPHEN AND CAFFEINE 50; 325; 40 MG/1; MG/1; MG/1
1-2 TABLET ORAL EVERY 4 HOURS PRN
Qty: 20 TABLET | Refills: 0 | Status: SHIPPED | OUTPATIENT
Start: 2018-03-06 | End: 2018-03-13

## 2018-03-06 RX ORDER — METOCLOPRAMIDE HYDROCHLORIDE 5 MG/ML
10 INJECTION INTRAMUSCULAR; INTRAVENOUS ONCE
Status: COMPLETED | OUTPATIENT
Start: 2018-03-06 | End: 2018-03-06

## 2018-03-06 RX ORDER — KETOROLAC TROMETHAMINE 30 MG/ML
30 INJECTION, SOLUTION INTRAMUSCULAR; INTRAVENOUS ONCE
Status: COMPLETED | OUTPATIENT
Start: 2018-03-06 | End: 2018-03-06

## 2018-03-06 RX ORDER — DIPHENHYDRAMINE HYDROCHLORIDE 50 MG/ML
25 INJECTION INTRAMUSCULAR; INTRAVENOUS ONCE
Status: COMPLETED | OUTPATIENT
Start: 2018-03-06 | End: 2018-03-06

## 2018-03-06 NOTE — ED INITIAL ASSESSMENT (HPI)
States was diagnosed with sinus infection on Thursday-went to walk in clinic and was given antibiotic and steroids. Today c/o left sided migraine headache and nausea/vomiting. Been taking sudafed.

## 2018-03-06 NOTE — ED PROVIDER NOTES
[facility] [place]    History   Patient presents with:  Headache (neurologic)    Stated Complaint: sinus infection onset thursday, seen in [facility] on meds now with migraine and worse     HPI    29-year-old female comes %  O2 Device: None (Room air)    Current:/96   Pulse 84   Temp 97.6 °F (36.4 °C) (Temporal)   Resp 16   Ht 170.2 cm (5' 7\")   Wt 99.8 kg   LMP 02/27/2018   SpO2 98%   BMI 34.46 kg/m²         Physical Exam    HEENT : NCAT, EOMI, PEERL, throat clear, sulci are appropriate for the patient's age. No mass effect. Visualized portions of paranasal sinuses are unremarkable. Visualized portions of mastoid air cells are unremarkable. Visualized portions of orbits are unremarkable.   IMPRESSION: Unremarkable

## 2018-03-07 ENCOUNTER — OFFICE VISIT (OUTPATIENT)
Dept: FAMILY MEDICINE CLINIC | Facility: CLINIC | Age: 38
End: 2018-03-07

## 2018-03-07 VITALS
HEIGHT: 67 IN | HEART RATE: 88 BPM | DIASTOLIC BLOOD PRESSURE: 90 MMHG | BODY MASS INDEX: 34.37 KG/M2 | SYSTOLIC BLOOD PRESSURE: 130 MMHG | TEMPERATURE: 98 F | RESPIRATION RATE: 16 BRPM | WEIGHT: 219 LBS

## 2018-03-07 DIAGNOSIS — G43.909 MIGRAINE WITHOUT STATUS MIGRAINOSUS, NOT INTRACTABLE, UNSPECIFIED MIGRAINE TYPE: Primary | ICD-10-CM

## 2018-03-07 DIAGNOSIS — R11.0 NAUSEA: ICD-10-CM

## 2018-03-07 PROCEDURE — 99214 OFFICE O/P EST MOD 30 MIN: CPT | Performed by: FAMILY MEDICINE

## 2018-03-07 RX ORDER — RIZATRIPTAN BENZOATE 10 MG/1
10 TABLET ORAL AS NEEDED
Qty: 8 TABLET | Refills: 0 | Status: SHIPPED | OUTPATIENT
Start: 2018-03-07 | End: 2018-04-06

## 2018-03-07 RX ORDER — ONDANSETRON 4 MG/1
4 TABLET, FILM COATED ORAL EVERY 8 HOURS PRN
Qty: 20 TABLET | Refills: 0 | Status: SHIPPED | OUTPATIENT
Start: 2018-03-07 | End: 2018-03-30

## 2018-03-07 NOTE — PROGRESS NOTES
HPI:   Eduarda Grant is a 30year old female that presents for emergency room follow-up. She was seen for severe left-sided migraine. She had negative CT scan and improved with Fioricet and was discharged home.   She states that her headache has con hours if needed. , Disp: 8 tablet, Rfl: 0  •  Ondansetron HCl (ZOFRAN) 4 mg tablet, Take 1 tablet (4 mg total) by mouth every 8 (eight) hours as needed for Nausea., Disp: 20 tablet, Rfl: 0  •  Butalbital-APAP-Caffeine -40 MG Oral Tab, Take 1-2 tablets conjunctivae clear bilaterally, no eye discharge    Ears: External normal. TMs normal without erythema or effusion   Nose: patent, no nasal discharge    Throat:  No tonsillar erythema or exudate. Mouth:  No oral lesions or ulcerations, good dentition. needed for Nausea. Dispense: 20 tablet; Refill: 0      Risks, benefits, and alternatives of current treatment plan discussed in detail. Questions and concerns addressed. Red flags to RTC or ED reviewed. Patient (or parent) agrees to plan.       Return fo

## 2018-03-07 NOTE — PATIENT INSTRUCTIONS
Migraine  Rizatriptan 10 mg orally as needed. Can repeat dose in 2 hours if not improved. Do not exceed 30 mg in 24 hours.     Nausea  Zofran 4 mg every 8 hours as needed    Complete medrol dose pack    Follow up if not improving        What Are Migraine experience these symptoms, you should immediately seek medical attention because you could be having a stroke. Is it a tension headache? This type of headache is usually a dull ache or a sensation of pressure on both sides of the head.  It may be associat

## 2018-03-08 ENCOUNTER — TELEPHONE (OUTPATIENT)
Dept: FAMILY MEDICINE CLINIC | Facility: CLINIC | Age: 38
End: 2018-03-08

## 2018-03-08 PROBLEM — G43.109 MIGRAINE WITH AURA: Status: ACTIVE | Noted: 2018-03-08

## 2018-03-08 NOTE — TELEPHONE ENCOUNTER
Patient  states that medication for migraines is not working, states she woke up this morning with a bad migraine and she can not get up or do anything. Pts  is wondering if there is an alternative medication she can try.  Advised that if symp

## 2018-03-08 NOTE — TELEPHONE ENCOUNTER
Patient's medication is not working at [employer] work, cant drive, doing any normal tasks is extremely painful.

## 2018-03-09 ENCOUNTER — TELEPHONE (OUTPATIENT)
Dept: FAMILY MEDICINE CLINIC | Facility: CLINIC | Age: 38
End: 2018-03-09

## 2018-03-09 DIAGNOSIS — G43.119 INTRACTABLE MIGRAINE WITH AURA WITHOUT STATUS MIGRAINOSUS: Primary | ICD-10-CM

## 2018-03-09 NOTE — TELEPHONE ENCOUNTER
If migraine has not improved and still 8/10 with Butalbital, Steroid, Triptan, Zofran and stopping estrogen containing birth control pill, she should go to the Emergency Department, may need MRI or IV meds and BP check.   She should also follow up with Bobby Waddell

## 2018-03-09 NOTE — TELEPHONE ENCOUNTER
I called patient to discuss. I told her per previous message she was advised to go to ER. Patient said she will not go to ER. She had gone to ER and then f/u here and was given medication (Maxalt) which is not helping her migraine.   She has current pain

## 2018-03-09 NOTE — TELEPHONE ENCOUNTER
PT was seen on Wednesday for ER f/u for migraines. Marina prescribed medication which she feels is not working because she has been in bed for the past 5 days. She needs something else to help her with the migraines.     Please advise pt at [phone]

## 2018-03-10 ENCOUNTER — HOSPITAL ENCOUNTER (EMERGENCY)
Facility: HOSPITAL | Age: 38
Discharge: HOME | End: 2018-03-10
Attending: EMERGENCY MEDICINE
Payer: COMMERCIAL

## 2018-03-10 VITALS
HEIGHT: 67 IN | RESPIRATION RATE: 18 BRPM | WEIGHT: 220 LBS | OXYGEN SATURATION: 96 % | TEMPERATURE: 98 F | BODY MASS INDEX: 34.53 KG/M2 | DIASTOLIC BLOOD PRESSURE: 101 MMHG | SYSTOLIC BLOOD PRESSURE: 144 MMHG | HEART RATE: 88 BPM

## 2018-03-10 DIAGNOSIS — G43.109 MIGRAINE WITH AURA AND WITHOUT STATUS MIGRAINOSUS, NOT INTRACTABLE: Primary | ICD-10-CM

## 2018-03-10 PROCEDURE — 99285 EMERGENCY DEPT VISIT HI MDM: CPT

## 2018-03-10 PROCEDURE — 96374 THER/PROPH/DIAG INJ IV PUSH: CPT

## 2018-03-10 PROCEDURE — 96375 TX/PRO/DX INJ NEW DRUG ADDON: CPT

## 2018-03-10 PROCEDURE — 96376 TX/PRO/DX INJ SAME DRUG ADON: CPT

## 2018-03-10 PROCEDURE — 99284 EMERGENCY DEPT VISIT MOD MDM: CPT

## 2018-03-10 RX ORDER — MORPHINE SULFATE 4 MG/ML
4 INJECTION, SOLUTION INTRAMUSCULAR; INTRAVENOUS ONCE
Status: COMPLETED | OUTPATIENT
Start: 2018-03-10 | End: 2018-03-10

## 2018-03-10 RX ORDER — LABETALOL HYDROCHLORIDE 5 MG/ML
20 INJECTION, SOLUTION INTRAVENOUS ONCE
Status: COMPLETED | OUTPATIENT
Start: 2018-03-10 | End: 2018-03-10

## 2018-03-10 RX ORDER — MORPHINE SULFATE 4 MG/ML
INJECTION, SOLUTION INTRAMUSCULAR; INTRAVENOUS
Status: DISCONTINUED
Start: 2018-03-10 | End: 2018-03-10

## 2018-03-10 RX ORDER — DEXAMETHASONE SODIUM PHOSPHATE 4 MG/ML
10 VIAL (ML) INJECTION ONCE
Status: COMPLETED | OUTPATIENT
Start: 2018-03-10 | End: 2018-03-10

## 2018-03-10 RX ORDER — METOCLOPRAMIDE HYDROCHLORIDE 5 MG/ML
10 INJECTION INTRAMUSCULAR; INTRAVENOUS ONCE
Status: COMPLETED | OUTPATIENT
Start: 2018-03-10 | End: 2018-03-10

## 2018-03-10 RX ORDER — LABETALOL HYDROCHLORIDE 5 MG/ML
INJECTION, SOLUTION INTRAVENOUS
Status: DISCONTINUED
Start: 2018-03-10 | End: 2018-03-10

## 2018-03-10 RX ORDER — ONDANSETRON 4 MG/1
TABLET, FILM COATED ORAL
Status: COMPLETED
Start: 2018-03-10 | End: 2018-03-10

## 2018-03-10 NOTE — ED NOTES
md updated pt would like to go home and not stay in hospital. Pt is comfortable seeing neuro next week.  md agrees

## 2018-03-10 NOTE — ED NOTES
Pt sitting up on stretcher NAD resp easy nonlabored.  H/a continues offered ice or heat pack pt refused

## 2018-03-10 NOTE — ED INITIAL ASSESSMENT (HPI)
Pt aox4. Pt presents to ed from home. Pt c/o ha x 7 days. Pt was seen and treated at [facility] on Monday. Pt states migraine meds not helping, pt was then seen by PCP on weds & placed on new migraine med. Pt c/o continuous headache.  Pt denies fever, de

## 2018-03-10 NOTE — ED PROVIDER NOTES
Patient Seen in: [facility] Emergency Department    History   Patient presents with:  Headache (neurologic)    Stated Complaint: 7 days of headache    HPI    68-year-old female complaint of headache patient has a history of migraine headaches in the HPI.  Constitutional and vital signs reviewed. All other systems reviewed and negative except as noted above.     Physical Exam   ED Triage Vitals [03/10/18 0830]  BP: (!) 172/105  Pulse: 92  Resp: 16  Temp: 97.8 °F (36.6 °C)  Temp src: Temporal  SpO2: diagnosis)    Disposition:  Discharge  3/10/2018  1:24 pm    Follow-up:  [address] [facility] [facility], [address] [place] [address] [facility]  Call in 3 day(s)  choose option 1 for general neurology

## 2018-03-26 ENCOUNTER — OFFICE VISIT (OUTPATIENT)
Dept: FAMILY MEDICINE CLINIC | Facility: CLINIC | Age: 38
End: 2018-03-26

## 2018-03-26 VITALS
HEART RATE: 110 BPM | BODY MASS INDEX: 34.75 KG/M2 | RESPIRATION RATE: 16 BRPM | WEIGHT: 221.38 LBS | HEIGHT: 67 IN | TEMPERATURE: 98 F | SYSTOLIC BLOOD PRESSURE: 142 MMHG | DIASTOLIC BLOOD PRESSURE: 98 MMHG

## 2018-03-26 DIAGNOSIS — G43.109 MIGRAINE WITH AURA AND WITHOUT STATUS MIGRAINOSUS, NOT INTRACTABLE: ICD-10-CM

## 2018-03-26 DIAGNOSIS — I10 ESSENTIAL HYPERTENSION: Primary | ICD-10-CM

## 2018-03-26 PROBLEM — M65.4 DE QUERVAIN'S TENOSYNOVITIS, LEFT: Status: RESOLVED | Noted: 2017-04-04 | Resolved: 2018-03-26

## 2018-03-26 PROCEDURE — 99213 OFFICE O/P EST LOW 20 MIN: CPT | Performed by: FAMILY MEDICINE

## 2018-03-26 RX ORDER — METOPROLOL SUCCINATE 50 MG/1
50 TABLET, EXTENDED RELEASE ORAL DAILY
Qty: 30 TABLET | Refills: 0 | Status: SHIPPED | OUTPATIENT
Start: 2018-03-26 | End: 2018-04-23

## 2018-03-26 RX ORDER — METOPROLOL SUCCINATE 50 MG/1
TABLET, EXTENDED RELEASE ORAL
Qty: 90 TABLET | Refills: 0 | OUTPATIENT
Start: 2018-03-26

## 2018-03-26 RX ORDER — METOPROLOL SUCCINATE 50 MG/1
50 TABLET, EXTENDED RELEASE ORAL DAILY
Qty: 30 TABLET | Refills: 0 | OUTPATIENT
Start: 2018-03-26 | End: 2018-04-25

## 2018-03-26 NOTE — PROGRESS NOTES
HPI:   Eloy Fu is a 30year old female that presents for follow up. Hx of migraine with aura. She has chosen to continue OCP despite migraine with aura. She understands this is typically a contraindication.   She continues to have left tolu reviewed. Appears stated age, well groomed. Physical Exam:  GEN:  Patient is alert, awake and oriented, well developed, well nourished, no apparent distress.   HEENT:     Head:  Normocephalic, atraumatic    Eyes: EOMI, PERRLA, no scleral icterus, conjunctiv

## 2018-03-26 NOTE — PATIENT INSTRUCTIONS
Blood Pressure  Stop nifedipine and change to metoprolol 50 mg daily  Check blood pressure and pulse daily and bring log to next visit.     Goal BP < 140/90, pulse 60-90  Call or follow up sooner if BP consistently >160/100 or HR > 90 or < 50  Follow up in restaurant.   ¨ The American Heart Association [facility]) says the \"ideal\" amount of sodium is no more than 1,500 mg a day.  But because Americans eat so much salt, you can make a positive change by cutting back to even 2,400 mg of sodium a day.   · Follow the not intended as a substitute for professional medical care. Always follow your healthcare professional's instructions.

## 2018-03-30 ENCOUNTER — OFFICE VISIT (OUTPATIENT)
Dept: OBGYN CLINIC | Facility: CLINIC | Age: 38
End: 2018-03-30

## 2018-03-30 VITALS
SYSTOLIC BLOOD PRESSURE: 108 MMHG | HEART RATE: 98 BPM | WEIGHT: 223.38 LBS | HEIGHT: 65 IN | BODY MASS INDEX: 37.22 KG/M2 | DIASTOLIC BLOOD PRESSURE: 68 MMHG

## 2018-03-30 DIAGNOSIS — Z01.419 ENCOUNTER FOR GYNECOLOGICAL EXAMINATION WITHOUT ABNORMAL FINDING: Primary | ICD-10-CM

## 2018-03-30 DIAGNOSIS — N92.1 BREAKTHROUGH BLEEDING ON OCPS: ICD-10-CM

## 2018-03-30 DIAGNOSIS — Z12.4 ROUTINE PAPANICOLAOU SMEAR: ICD-10-CM

## 2018-03-30 PROCEDURE — 87624 HPV HI-RISK TYP POOLED RSLT: CPT | Performed by: OBSTETRICS & GYNECOLOGY

## 2018-03-30 PROCEDURE — 88175 CYTOPATH C/V AUTO FLUID REDO: CPT | Performed by: OBSTETRICS & GYNECOLOGY

## 2018-03-30 PROCEDURE — 99395 PREV VISIT EST AGE 18-39: CPT | Performed by: OBSTETRICS & GYNECOLOGY

## 2018-03-30 RX ORDER — DROSPIRENONE AND ETHINYL ESTRADIOL 0.02-3(28)
1 KIT ORAL DAILY
Qty: 3 PACKAGE | Refills: 0 | Status: SHIPPED | OUTPATIENT
Start: 2018-03-30 | End: 2018-06-20

## 2018-03-30 NOTE — PROGRESS NOTES
Patient ID:   Augusto Herrera  is a 30year old female         HPI:     Here for gyn exam. Last seen 2016. New medical/surgical hx:  Recent onset of migraine headaches. Not with menses. No auras.  Did outpt therapy program at [facility] Levothyroxine Sodium 175 MCG Oral Tab TAKE ONE TABLET BY MOUTH BEFORE BREAKFAST Disp: 90 tablet Rfl: 1   Venlafaxine HCl ER (EFFEXOR XR) 150 MG Oral Capsule SR 24 Hr Take 1 capsule (150 mg total) by mouth 2 (two) times daily.  Disp: 180 capsule Rfl: 0   A

## 2018-04-23 ENCOUNTER — OFFICE VISIT (OUTPATIENT)
Dept: FAMILY MEDICINE CLINIC | Facility: CLINIC | Age: 38
End: 2018-04-23

## 2018-04-23 VITALS
WEIGHT: 223.38 LBS | BODY MASS INDEX: 35.06 KG/M2 | HEART RATE: 72 BPM | SYSTOLIC BLOOD PRESSURE: 138 MMHG | DIASTOLIC BLOOD PRESSURE: 80 MMHG | HEIGHT: 67 IN | RESPIRATION RATE: 16 BRPM | TEMPERATURE: 98 F

## 2018-04-23 DIAGNOSIS — I10 ESSENTIAL HYPERTENSION: ICD-10-CM

## 2018-04-23 DIAGNOSIS — G43.109 MIGRAINE WITH AURA AND WITHOUT STATUS MIGRAINOSUS, NOT INTRACTABLE: Primary | ICD-10-CM

## 2018-04-23 PROCEDURE — 99213 OFFICE O/P EST LOW 20 MIN: CPT | Performed by: FAMILY MEDICINE

## 2018-04-23 RX ORDER — METOPROLOL SUCCINATE 50 MG/1
50 TABLET, EXTENDED RELEASE ORAL DAILY
Qty: 90 TABLET | Refills: 1 | Status: SHIPPED | OUTPATIENT
Start: 2018-04-23 | End: 2018-10-27

## 2018-04-23 NOTE — PROGRESS NOTES
HPI:   Tyrell Wynn is a 30year old female that presents for follow up. Since starting metoprolol last month she has had no further migraine headaches. Patient is very happy about outcome.   She denies any dizziness, weakness, palpitations, fati awake and oriented, well developed, well nourished, no apparent distress. HEENT:     Head:  Normocephalic, atraumatic    Eyes: EOMI, PERRLA, no scleral icterus, conjunctivae clear  NECK: Supple, no cervical LAD, no thyromegaly.   SKIN: No rashes, no skin l

## 2018-06-20 ENCOUNTER — TELEPHONE (OUTPATIENT)
Dept: OBGYN CLINIC | Facility: CLINIC | Age: 38
End: 2018-06-20

## 2018-06-21 RX ORDER — DROSPIRENONE AND ETHINYL ESTRADIOL 0.02-3(28)
1 KIT ORAL DAILY
Qty: 3 PACKAGE | Refills: 2 | Status: SHIPPED
Start: 2018-06-21 | End: 2019-02-18

## 2018-06-21 NOTE — TELEPHONE ENCOUNTER
From: Boo Trinh  Sent: 6/20/2018 10:10 PM CDT  Subject: Medication Renewal Request    Jana Dze Birmingham Courser would like a refill of the following medications:     Drospirenone-Ethinyl Estradiol 3-0.02 MG Oral Tab Aleyda Marie MD]   Patient Comment: I've completed 3 months on this medication. I feel great and would like to continue.     Preferred pharmacy: [facility], [address] [place] [address], [phone], [phone]

## 2018-06-24 ENCOUNTER — OFFICE VISIT (OUTPATIENT)
Dept: FAMILY MEDICINE CLINIC | Facility: CLINIC | Age: 38
End: 2018-06-24

## 2018-06-24 VITALS
DIASTOLIC BLOOD PRESSURE: 92 MMHG | HEART RATE: 105 BPM | OXYGEN SATURATION: 98 % | WEIGHT: 234.81 LBS | HEIGHT: 67 IN | SYSTOLIC BLOOD PRESSURE: 130 MMHG | BODY MASS INDEX: 36.85 KG/M2 | RESPIRATION RATE: 16 BRPM | TEMPERATURE: 98 F

## 2018-06-24 DIAGNOSIS — J02.9 ACUTE VIRAL PHARYNGITIS: ICD-10-CM

## 2018-06-24 DIAGNOSIS — J02.9 SORE THROAT: Primary | ICD-10-CM

## 2018-06-24 PROCEDURE — 87081 CULTURE SCREEN ONLY: CPT | Performed by: NURSE PRACTITIONER

## 2018-06-24 PROCEDURE — 99213 OFFICE O/P EST LOW 20 MIN: CPT | Performed by: NURSE PRACTITIONER

## 2018-06-24 PROCEDURE — 87880 STREP A ASSAY W/OPTIC: CPT | Performed by: NURSE PRACTITIONER

## 2018-06-24 RX ORDER — PREDNISONE 20 MG/1
40 TABLET ORAL DAILY
Qty: 6 TABLET | Refills: 0 | Status: SHIPPED | OUTPATIENT
Start: 2018-06-24 | End: 2018-06-27

## 2018-06-24 NOTE — PROGRESS NOTES
CHIEF COMPLAINT:   Patient presents with:  Sore Throat: x3days   Ear Pain: x3days      HPI:   Osito Raygoza is a 30year old female who presents to clinic with symptoms of sore throat. Patient has had for 3 days.  Symptoms have been worse since onse • Cancer Maternal Grandmother 80     Breast      Smoking status: Never Smoker                                                              Smokeless tobacco: Never Used                      Alcohol use: Yes           0.0 oz/week     Comment: very rare Kit Expiration Date 01/31/20 Date         ASSESSMENT AND PLAN:   Samson Farrar is a 30year old female who presents with sore throat symptoms that are consistent with:    ASSESSMENT:  Sore throat  (primary encounter diagnosis)  Acute viral pharyngiti The tonsils are on the sides of the throat near the base of the tongue. They collect viruses and bacteria and help fight infection. The throat (pharynx) is the passage for air. Mucus from the nasal cavity also moves down the passage.   An inflamed throat  T Treatment depends on many factors. What is the likely cause? Is the problem recent? Does it keep coming back? In many cases, the best thing to do is to treat the symptoms, rest, and let the problem heal itself.  Antibiotics may help clear up some bacterial In some cases, tonsils need to be removed. This is often done as outpatient (same-day) surgery. Your healthcare provider may advise removing the tonsils in cases of:  · Several severe bouts of tonsillitis in a year.  “Severe” episodes include those that juan josé Gargle to ease irritation  Gargling every hour or 2 can ease irritation.  Try gargling with 1 of these solutions:  · 1/4 teaspoon of salt in 1/2 cup of warm water  · An over-the-counter anesthetic gargle  Use medicine for more relief  Over-the-counter medic Pharyngitis (sore throat) is often due to a virus. It can also be caused by the streptococcus, or strep, bacterium, often called strep throat.  Both viral and strep infections can cause throat pain that is worse when swallowing, aching all over with headach · For children: Use acetaminophen for fever, fussiness, or discomfort.  In infants older than 10months of age, you may use ibuprofen instead of acetaminophen. Talk with your child's healthcare provider before giving these medicines if your child has chronic · Signs of dehydration (very dark urine or no urine, sunken eyes, dizziness)  · Trouble breathing or noisy breathing  · Muffled voice  · New rash  · Child appears to be getting sicker  Date Last Reviewed: 4/13/2015  © 9220-8272 [employer].  8

## 2018-06-24 NOTE — PATIENT INSTRUCTIONS
Will call with throat culture results. When You Have a Sore Throat    A sore throat can be painful. There are many reasons why you may have a sore throat. Your healthcare provider will work with you to find the cause of your sore throat.  He or she will During the exam, your healthcare provider checks your ears, nose, and throat for problems.  He or she also checks for swelling in the neck, and may listen to your chest.  Possible tests  Other tests your healthcare provider may perform include:  · A throat If your sore throat is due to a bacterial infection, antibiotics may speed healing and prevent complications.  Although group A streptococcus (\"strep throat\" or GAS) is the major treatable infection for a sore throat, GAS causes only 5% to 15% of sore thr © 7100-2033 [employer] [address]. [address] [facility] [place], [address] [place]. All rights reserved. This information is not intended as a substitute for professional medical care. Always follow your healthcare professional's instructions.           Self- · Limit contact with pets and with allergy-causing substances, such as pollen and mold. · When you’re around someone with a sore throat or cold, wash your hands often to keep viruses or bacteria from spreading. · Don’t strain your vocal cords.   Call your · Rest at home. Drink plenty of fluids so you won't get dehydrated. · If the test is positive for strep, don't go to work or school for the first 2 days of taking the antibiotics. After this time, you will not be contagious.  You can then return to work or · Fever as directed by your healthcare provider. For children, seek care if:  ¨ Your child is of any age and has repeated fevers above 104°F (40°C).   ¨ Your child is younger than 3years of age and has a fever of 100.4°F (38°C) that continues for more than

## 2018-07-20 ENCOUNTER — OFFICE VISIT (OUTPATIENT)
Dept: FAMILY MEDICINE CLINIC | Facility: CLINIC | Age: 38
End: 2018-07-20

## 2018-07-20 VITALS
HEIGHT: 67 IN | SYSTOLIC BLOOD PRESSURE: 126 MMHG | RESPIRATION RATE: 16 BRPM | TEMPERATURE: 98 F | OXYGEN SATURATION: 98 % | WEIGHT: 233 LBS | HEART RATE: 100 BPM | BODY MASS INDEX: 36.57 KG/M2 | DIASTOLIC BLOOD PRESSURE: 84 MMHG

## 2018-07-20 DIAGNOSIS — J34.89 SINUS PRESSURE: ICD-10-CM

## 2018-07-20 DIAGNOSIS — J04.0 LARYNGITIS: ICD-10-CM

## 2018-07-20 DIAGNOSIS — J02.9 SORE THROAT: Primary | ICD-10-CM

## 2018-07-20 LAB
CONTROL LINE PRESENT WITH A CLEAR BACKGROUND (YES/NO): YES YES/NO
STREP GRP A CUL-SCR: NEGATIVE

## 2018-07-20 PROCEDURE — 87880 STREP A ASSAY W/OPTIC: CPT | Performed by: NURSE PRACTITIONER

## 2018-07-20 PROCEDURE — 99213 OFFICE O/P EST LOW 20 MIN: CPT | Performed by: NURSE PRACTITIONER

## 2018-07-20 RX ORDER — PREDNISONE 20 MG/1
40 TABLET ORAL DAILY
Qty: 6 TABLET | Refills: 0 | Status: SHIPPED | OUTPATIENT
Start: 2018-07-20 | End: 2018-07-23

## 2018-07-20 NOTE — PATIENT INSTRUCTIONS
Laryngitis    Laryngitis is a swelling of the tissues around the vocal cords. Symptoms include a hoarse (scratchy) voice. The voice may be lost completely. It may be caused by a viral illness, such as a head or chest cold.  It may also be due to overuse a You have a viral upper respiratory illness (URI), which is another term for the common cold. This illness is contagious during the first few days. It is spread through the air by coughing and sneezing.  It may also be spread by direct contact (touching the · Cough with lots of colored sputum (mucus)  · Severe headache; face, neck, or ear pain  · Difficulty swallowing due to throat pain  · Fever of 100.4°F (38°C) or higher, or as directed by your healthcare provider  Call 911  Call 911 if any of these occur:

## 2018-07-20 NOTE — PROGRESS NOTES
CHIEF COMPLAINT:   Patient presents with:  Swollen Glands: started with body aches, sinus pressure 3 days ago, hoarse voice    HPI:   Rios Garrido is a 30year old female presents to clinic with complaint of sore throat. Patient has had for 3 days. GENERAL HEALTH: feels well otherwise, good appetite  SKIN: denies any unusual skin lesions or rashes  HEENT: denies ear pain, See HPI  RESPIRATORY: denies shortness of breath or wheezing  CARDIOVASCULAR: denies chest pain or palpitations   GI: denies vomit 3. Sinus pressure  - predniSONE 20 MG Oral Tab; Take 2 tablets (40 mg total) by mouth daily. Take in am with food  Dispense: 6 tablet; Refill: 0  Start flonase.      Plan: Discussed that due to symptoms and negative rapid strep this is most likely viral and © 3445-8666 [employer]. [address] [facility] [place], [facility] [place]. All rights reserved. This information is not intended as a substitute for professional medical care. Always follow your healthcare professional's instructions.         Viral U · Over-the-counter cold medicines will not shorten the length of time you’re sick, but they may be helpful for the following symptoms: cough, sore throat, and nasal and sinus congestion.  (Note: Do not use decongestants if you have high blood pressure.)  Fo

## 2018-07-25 ENCOUNTER — OFFICE VISIT (OUTPATIENT)
Dept: FAMILY MEDICINE CLINIC | Facility: CLINIC | Age: 38
End: 2018-07-25

## 2018-07-25 VITALS
SYSTOLIC BLOOD PRESSURE: 128 MMHG | HEART RATE: 90 BPM | RESPIRATION RATE: 18 BRPM | TEMPERATURE: 99 F | DIASTOLIC BLOOD PRESSURE: 64 MMHG | BODY MASS INDEX: 36.57 KG/M2 | OXYGEN SATURATION: 98 % | HEIGHT: 67 IN | WEIGHT: 233 LBS

## 2018-07-25 DIAGNOSIS — J02.9 SORE THROAT: Primary | ICD-10-CM

## 2018-07-25 DIAGNOSIS — J01.10 ACUTE FRONTAL SINUSITIS, RECURRENCE NOT SPECIFIED: ICD-10-CM

## 2018-07-25 LAB — CONTROL LINE PRESENT WITH A CLEAR BACKGROUND (YES/NO): YES YES/NO

## 2018-07-25 PROCEDURE — 86308 HETEROPHILE ANTIBODY SCREEN: CPT | Performed by: PHYSICIAN ASSISTANT

## 2018-07-25 PROCEDURE — 99213 OFFICE O/P EST LOW 20 MIN: CPT | Performed by: PHYSICIAN ASSISTANT

## 2018-07-25 RX ORDER — AMOXICILLIN AND CLAVULANATE POTASSIUM 875; 125 MG/1; MG/1
1 TABLET, FILM COATED ORAL 2 TIMES DAILY
Qty: 20 TABLET | Refills: 0 | Status: SHIPPED | OUTPATIENT
Start: 2018-07-25 | End: 2018-08-04

## 2018-07-26 NOTE — PATIENT INSTRUCTIONS
Sinusitis (Antibiotic Treatment)    The sinuses are air-filled spaces within the bones of the face. They connect to the inside of the nose. Sinusitis is an inflammation of the tissue lining the sinus cavity. Sinus inflammation can occur during a cold.  It · Do not use nasal rinses or irrigation during an acute sinus infection, unless told to by your health care provider. Rinsing may spread the infection to other sinuses.   · Use acetaminophen or ibuprofen to control pain, unless another pain medicine was pre Drinking extra fluids helps thin your mucus. This lets it drain from your sinuses more easily. Have a glass of water every hour or two. A humidifier helps in much the same way. Fluids can also offset the drying effects of certain medicines.  If you use a hu · Try a sip of water first thing after waking up. · Keep your throat moist by drinking 6 or more glasses of clear liquids every day. · Run a cool-air humidifier in your room overnight.   · Avoid cigarette smoke.   · Suck on throat lozenges, cough drops, h © 3528-8930 [employer]. 1407 [facility] [place], [facility] [place]. All rights reserved. This information is not intended as a substitute for professional medical care. Always follow your healthcare professional's instructions.

## 2018-07-26 NOTE — PROGRESS NOTES
CHIEF COMPLAINT:   Patient presents with:  Sinusitis    HPI:   Abdi Medina is a 30year old female who presents for sinus congestion since last [facility] on 7/20. Symptoms have been worsening since onset.  Sinus congestion/pain is described as a pressure an Smoking status: Never Smoker                                                              Smokeless tobacco: Never Used                      Alcohol use: Yes           0.0 oz/week     Comment: very rare        REVIEW OF SYSTEMS:   GENERAL: feels well other Amoxicillin-Pot Clavulanate 875-125 MG Oral Tab 20 tablet 0      Sig: Take 1 tablet by mouth 2 (two) times daily. Risks, benefits, side effects of medication addressed and explained.     Patient Instructions       Sinusitis (Antibiotic Treatment) · Over-the-counter decongestants may be used unless a similar medicine was prescribed. Nasal sprays work the fastest. Use one that contains phenylephrine or oxymetazoline. First blow the nose gently. Then use the spray.  Do not use these medicines more ofte © 5952-7182 [employer] [address]. 1407 [facility] [place] [place], [address] [place]. All rights reserved. This information is not intended as a substitute for professional medical care. Always follow your healthcare professional's instructions.         Self-Ca © 6949-1274 [employer] [address]. 1407 [facility] [place], [address] [place]. All rights reserved. This information is not intended as a substitute for professional medical care. Always follow your healthcare professional's instructions.         Self-Ca · Limit contact with pets and with allergy-causing substances, such as pollen and mold. · When you’re around someone with a sore throat or cold, wash your hands often to keep viruses or bacteria from spreading. · Don’t strain your vocal cords.   Call your

## 2018-08-15 ENCOUNTER — TELEPHONE (OUTPATIENT)
Dept: FAMILY MEDICINE CLINIC | Facility: CLINIC | Age: 38
End: 2018-08-15

## 2018-08-15 NOTE — TELEPHONE ENCOUNTER
Pt is calling to advise BP medication is on the recall list for  Metoprolol Succinate ER 50 MG Oral Tablet 24 Hr 90 tablet 1 4/23/2018     Sig - Route:  Take 1 tablet (50 mg total) by mouth daily. - Oral    E-Prescribing Status: Receipt confirmed by pharmac

## 2018-08-15 NOTE — TELEPHONE ENCOUNTER
I called pharmacy to verify if this. They have no record of this. They are going to call patient and discuss with her. They will call us back if there is an issue we need to address.       2.  Migraine with aura and without status migrainosus, not intrac

## 2018-08-21 RX ORDER — LEVOTHYROXINE SODIUM 175 UG/1
TABLET ORAL
Qty: 30 TABLET | Refills: 0 | Status: SHIPPED | OUTPATIENT
Start: 2018-08-21 | End: 2018-09-24

## 2018-08-21 NOTE — TELEPHONE ENCOUNTER
Requesting   LEVOTHYROXINE 0.175MG (175MCG)TABS  Will file in chart as: LEVOTHYROXINE SODIUM 175 MCG Oral Tab  TAKE ONE TABLET BY MOUTH BEFORE BREAKFAST       Disp: 90 tablet Refills: 0    Class: Normal Start: 8/20/2018   Documented:4 years ago  Last refil

## 2018-08-21 NOTE — TELEPHONE ENCOUNTER
Levothyroxine 30 days given, overdue for labs.   No further refills without labs    Emmie Anton, DO  Family Medicine

## 2018-08-22 RX ORDER — LEVOTHYROXINE SODIUM 175 UG/1
TABLET ORAL
Qty: 90 TABLET | Refills: 0 | OUTPATIENT
Start: 2018-08-22

## 2018-09-20 ENCOUNTER — LAB ENCOUNTER (OUTPATIENT)
Dept: LAB | Age: 38
End: 2018-09-20
Attending: PHYSICIAN ASSISTANT
Payer: COMMERCIAL

## 2018-09-20 DIAGNOSIS — E03.1 CONGENITAL HYPOTHYROIDISM WITHOUT GOITER: ICD-10-CM

## 2018-09-20 LAB
ALBUMIN SERPL-MCNC: 3.7 G/DL (ref 3.5–4.8)
ALBUMIN/GLOB SERPL: 1.1 {RATIO} (ref 1–2)
ALP LIVER SERPL-CCNC: 72 U/L (ref 37–98)
ALT SERPL-CCNC: 48 U/L (ref 14–54)
ANION GAP SERPL CALC-SCNC: 6 MMOL/L (ref 0–18)
AST SERPL-CCNC: 32 U/L (ref 15–41)
BASOPHILS # BLD AUTO: 0 X10(3) UL (ref 0–0.1)
BASOPHILS NFR BLD AUTO: 0 %
BILIRUB SERPL-MCNC: 0.2 MG/DL (ref 0.1–2)
BUN BLD-MCNC: 13 MG/DL (ref 8–20)
BUN/CREAT SERPL: 14.4 (ref 10–20)
CALCIUM BLD-MCNC: 9.3 MG/DL (ref 8.3–10.3)
CHLORIDE SERPL-SCNC: 108 MMOL/L (ref 101–111)
CHOLEST SMN-MCNC: 244 MG/DL (ref ?–200)
CO2 SERPL-SCNC: 28 MMOL/L (ref 22–32)
CREAT BLD-MCNC: 0.9 MG/DL (ref 0.55–1.02)
EOSINOPHIL # BLD AUTO: 0 X10(3) UL (ref 0–0.3)
EOSINOPHIL NFR BLD AUTO: 0 %
ERYTHROCYTE [DISTWIDTH] IN BLOOD BY AUTOMATED COUNT: 13.1 % (ref 11.5–16)
GLOBULIN PLAS-MCNC: 3.5 G/DL (ref 2.5–4)
GLUCOSE BLD-MCNC: 94 MG/DL (ref 70–99)
HAV AB SERPL IA-ACNC: 545 PG/ML (ref 193–986)
HCT VFR BLD AUTO: 43.4 % (ref 34–50)
HDLC SERPL-MCNC: 47 MG/DL (ref 40–59)
HGB BLD-MCNC: 14 G/DL (ref 12–16)
IMMATURE GRANULOCYTE COUNT: 0.01 X10(3) UL (ref 0–1)
IMMATURE GRANULOCYTE RATIO %: 0.2 %
LDLC SERPL CALC-MCNC: 127 MG/DL (ref ?–100)
LYMPHOCYTES # BLD AUTO: 1.73 X10(3) UL (ref 0.9–4)
LYMPHOCYTES NFR BLD AUTO: 33 %
M PROTEIN MFR SERPL ELPH: 7.2 G/DL (ref 6.1–8.3)
MCH RBC QN AUTO: 30.1 PG (ref 27–33.2)
MCHC RBC AUTO-ENTMCNC: 32.3 G/DL (ref 31–37)
MCV RBC AUTO: 93.3 FL (ref 81–100)
MONOCYTES # BLD AUTO: 0.48 X10(3) UL (ref 0.1–1)
MONOCYTES NFR BLD AUTO: 9.1 %
NEUTROPHIL ABS PRELIM: 3.03 X10 (3) UL (ref 1.3–6.7)
NEUTROPHILS # BLD AUTO: 3.03 X10(3) UL (ref 1.3–6.7)
NEUTROPHILS NFR BLD AUTO: 57.7 %
NONHDLC SERPL-MCNC: 197 MG/DL (ref ?–130)
OSMOLALITY SERPL CALC.SUM OF ELEC: 294 MOSM/KG (ref 275–295)
PLATELET # BLD AUTO: 252 10(3)UL (ref 150–450)
POTASSIUM SERPL-SCNC: 4.6 MMOL/L (ref 3.6–5.1)
RBC # BLD AUTO: 4.65 X10(6)UL (ref 3.8–5.1)
RED CELL DISTRIBUTION WIDTH-SD: 44.5 FL (ref 35.1–46.3)
SODIUM SERPL-SCNC: 142 MMOL/L (ref 136–144)
T4 FREE SERPL-MCNC: 1.3 NG/DL (ref 0.9–1.8)
TRIGL SERPL-MCNC: 349 MG/DL (ref 30–149)
TSI SER-ACNC: 0.74 MIU/ML (ref 0.35–5.5)
VIT D+METAB SERPL-MCNC: 18.4 NG/ML (ref 30–100)
VLDLC SERPL CALC-MCNC: 70 MG/DL (ref 0–30)
WBC # BLD AUTO: 5.3 X10(3) UL (ref 4–13)

## 2018-09-20 PROCEDURE — 80053 COMPREHEN METABOLIC PANEL: CPT

## 2018-09-20 PROCEDURE — 80061 LIPID PANEL: CPT

## 2018-09-20 PROCEDURE — 82306 VITAMIN D 25 HYDROXY: CPT

## 2018-09-20 PROCEDURE — 36415 COLL VENOUS BLD VENIPUNCTURE: CPT

## 2018-09-20 PROCEDURE — 84439 ASSAY OF FREE THYROXINE: CPT

## 2018-09-20 PROCEDURE — 84443 ASSAY THYROID STIM HORMONE: CPT

## 2018-09-20 PROCEDURE — 82607 VITAMIN B-12: CPT

## 2018-09-20 PROCEDURE — 85025 COMPLETE CBC W/AUTO DIFF WBC: CPT

## 2018-09-24 RX ORDER — LEVOTHYROXINE SODIUM 175 UG/1
TABLET ORAL
Qty: 90 TABLET | Refills: 1 | Status: SHIPPED | OUTPATIENT
Start: 2018-09-24 | End: 2019-04-03 | Stop reason: DRUGHIGH

## 2018-09-24 RX ORDER — ERGOCALCIFEROL 1.25 MG/1
CAPSULE ORAL
Qty: 12 CAPSULE | Refills: 0 | Status: SHIPPED | OUTPATIENT
Start: 2018-09-24 | End: 2018-09-25 | Stop reason: CLARIF

## 2018-09-24 NOTE — PROGRESS NOTES
Cholesterol is elevated and so is triglycerides, I would recommend working low carb diet, increase physical activity, 30 min moderate physical activity most days of the week  Low Vit D, begin 50,000 IU weekly x 12 weeks, and then OTC 2,000 IU daily  OV 3 m

## 2018-09-25 ENCOUNTER — TELEPHONE (OUTPATIENT)
Dept: FAMILY MEDICINE CLINIC | Facility: CLINIC | Age: 38
End: 2018-09-25

## 2018-09-25 RX ORDER — ERGOCALCIFEROL 1.25 MG/1
50000 CAPSULE ORAL WEEKLY
Qty: 4 CAPSULE | Refills: 2 | Status: SHIPPED | OUTPATIENT
Start: 2018-09-25 | End: 2018-09-29

## 2018-10-27 DIAGNOSIS — I10 ESSENTIAL HYPERTENSION: ICD-10-CM

## 2018-10-27 DIAGNOSIS — G43.109 MIGRAINE WITH AURA AND WITHOUT STATUS MIGRAINOSUS, NOT INTRACTABLE: ICD-10-CM

## 2018-10-29 DIAGNOSIS — I10 ESSENTIAL HYPERTENSION: ICD-10-CM

## 2018-10-29 DIAGNOSIS — G43.109 MIGRAINE WITH AURA AND WITHOUT STATUS MIGRAINOSUS, NOT INTRACTABLE: ICD-10-CM

## 2018-10-29 RX ORDER — METOPROLOL SUCCINATE 50 MG/1
50 TABLET, EXTENDED RELEASE ORAL DAILY
Qty: 30 TABLET | Refills: 0 | Status: SHIPPED | OUTPATIENT
Start: 2018-10-29 | End: 2018-11-21

## 2018-10-29 NOTE — TELEPHONE ENCOUNTER
Requesting Metoprolol  LOV: 4/23/18  RTC: 6 months  Last Relevant Labs: 9/20/18  Filled: 4/23/18 #90 with 1 refills    Future Appointments   Date Time Provider Sidney Nereida   1/22/2019  5:00 PM Janina Alvarez MD [facility]   4/18/2019  6:00 PM Noemy Matias MD [facility] [address]     Needs appt for further one month given with note to f/u

## 2018-10-30 RX ORDER — METOPROLOL SUCCINATE 50 MG/1
TABLET, EXTENDED RELEASE ORAL
Qty: 90 TABLET | Refills: 0 | OUTPATIENT
Start: 2018-10-30

## 2018-11-21 ENCOUNTER — OFFICE VISIT (OUTPATIENT)
Dept: FAMILY MEDICINE CLINIC | Facility: CLINIC | Age: 38
End: 2018-11-21

## 2018-11-21 VITALS
OXYGEN SATURATION: 98 % | WEIGHT: 230 LBS | BODY MASS INDEX: 36.1 KG/M2 | SYSTOLIC BLOOD PRESSURE: 136 MMHG | HEART RATE: 69 BPM | RESPIRATION RATE: 16 BRPM | DIASTOLIC BLOOD PRESSURE: 86 MMHG | HEIGHT: 67 IN | TEMPERATURE: 98 F

## 2018-11-21 DIAGNOSIS — G43.109 MIGRAINE WITH AURA AND WITHOUT STATUS MIGRAINOSUS, NOT INTRACTABLE: ICD-10-CM

## 2018-11-21 DIAGNOSIS — E55.9 VITAMIN D DEFICIENCY: ICD-10-CM

## 2018-11-21 DIAGNOSIS — Z23 NEED FOR VACCINATION: ICD-10-CM

## 2018-11-21 DIAGNOSIS — E03.9 HYPOTHYROIDISM, UNSPECIFIED TYPE: Chronic | ICD-10-CM

## 2018-11-21 DIAGNOSIS — I10 ESSENTIAL HYPERTENSION: Primary | ICD-10-CM

## 2018-11-21 PROCEDURE — 90686 IIV4 VACC NO PRSV 0.5 ML IM: CPT | Performed by: FAMILY MEDICINE

## 2018-11-21 PROCEDURE — 99214 OFFICE O/P EST MOD 30 MIN: CPT | Performed by: FAMILY MEDICINE

## 2018-11-21 PROCEDURE — 90471 IMMUNIZATION ADMIN: CPT | Performed by: FAMILY MEDICINE

## 2018-11-21 RX ORDER — ERGOCALCIFEROL 1.25 MG/1
CAPSULE ORAL
Refills: 2 | COMMUNITY
Start: 2018-11-13 | End: 2019-07-15 | Stop reason: ALTCHOICE

## 2018-11-21 RX ORDER — METOPROLOL SUCCINATE 50 MG/1
50 TABLET, EXTENDED RELEASE ORAL DAILY
Qty: 90 TABLET | Refills: 1 | Status: SHIPPED | OUTPATIENT
Start: 2018-11-21 | End: 2019-01-14

## 2018-11-21 NOTE — PROGRESS NOTES
HPI:   Jacquelin Ferro is a 41year old female who presents to establish care.     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  Diet: watches calories closely     REVIEW OF SYSTEMS:   GENERAL: feels well otherwise  SKIN: denies any unusual skin lesions  EYES:denies blurred vision or d migrainosus, not intractable    - Metoprolol Succinate ER 50 MG Oral Tablet 24 Hr; Take 1 tablet (50 mg total) by mouth daily. Dispense: 90 tablet; Refill: 1    5.  Need for vaccination    - FLULAVAL INFLUENZA VACCINE QUAD PRESERVATIVE FREE 0.5 ML      Billy Fuelling

## 2019-01-11 ENCOUNTER — HOSPITAL ENCOUNTER (EMERGENCY)
Facility: HOSPITAL | Age: 39
Discharge: HOME | End: 2019-01-11
Attending: EMERGENCY MEDICINE
Payer: COMMERCIAL

## 2019-01-11 ENCOUNTER — APPOINTMENT (OUTPATIENT)
Dept: CT IMAGING | Facility: HOSPITAL | Age: 39
End: 2019-01-11
Attending: EMERGENCY MEDICINE
Payer: COMMERCIAL

## 2019-01-11 VITALS
SYSTOLIC BLOOD PRESSURE: 155 MMHG | RESPIRATION RATE: 14 BRPM | WEIGHT: 220 LBS | OXYGEN SATURATION: 99 % | HEART RATE: 81 BPM | BODY MASS INDEX: 34 KG/M2 | DIASTOLIC BLOOD PRESSURE: 106 MMHG | TEMPERATURE: 98 F

## 2019-01-11 DIAGNOSIS — R51.9 HEADACHE DISORDER: Primary | ICD-10-CM

## 2019-01-11 DIAGNOSIS — R22.0 SUPERFICIAL SWELLING OF SCALP: ICD-10-CM

## 2019-01-11 DIAGNOSIS — I15.9 SECONDARY HYPERTENSION: ICD-10-CM

## 2019-01-11 LAB
ALBUMIN SERPL-MCNC: 3.3 G/DL (ref 3.1–4.5)
ALBUMIN/GLOB SERPL: 0.9 {RATIO} (ref 1–2)
ALP LIVER SERPL-CCNC: 73 U/L (ref 37–98)
ALT SERPL-CCNC: 24 U/L (ref 14–54)
ANION GAP SERPL CALC-SCNC: 7 MMOL/L (ref 0–18)
AST SERPL-CCNC: 20 U/L (ref 15–41)
BASOPHILS # BLD AUTO: 0.01 X10(3) UL (ref 0–0.1)
BASOPHILS NFR BLD AUTO: 0.1 %
BILIRUB SERPL-MCNC: 0.1 MG/DL (ref 0.1–2)
BUN BLD-MCNC: 10 MG/DL (ref 8–20)
BUN/CREAT SERPL: 10.5 (ref 10–20)
CALCIUM BLD-MCNC: 8.7 MG/DL (ref 8.3–10.3)
CHLORIDE SERPL-SCNC: 109 MMOL/L (ref 101–111)
CO2 SERPL-SCNC: 26 MMOL/L (ref 22–32)
CREAT BLD-MCNC: 0.95 MG/DL (ref 0.55–1.02)
EOSINOPHIL # BLD AUTO: 0 X10(3) UL (ref 0–0.3)
EOSINOPHIL NFR BLD AUTO: 0 %
ERYTHROCYTE [DISTWIDTH] IN BLOOD BY AUTOMATED COUNT: 12.8 % (ref 11.5–16)
GLOBULIN PLAS-MCNC: 3.8 G/DL (ref 2.8–4.4)
GLUCOSE BLD-MCNC: 107 MG/DL (ref 70–99)
HCT VFR BLD AUTO: 41.5 % (ref 34–50)
HGB BLD-MCNC: 14 G/DL (ref 12–16)
IMMATURE GRANULOCYTE COUNT: 0.01 X10(3) UL (ref 0–1)
IMMATURE GRANULOCYTE RATIO %: 0.1 %
LYMPHOCYTES # BLD AUTO: 2.05 X10(3) UL (ref 0.9–4)
LYMPHOCYTES NFR BLD AUTO: 29.5 %
M PROTEIN MFR SERPL ELPH: 7.1 G/DL (ref 6.4–8.2)
MCH RBC QN AUTO: 30 PG (ref 27–33.2)
MCHC RBC AUTO-ENTMCNC: 33.7 G/DL (ref 31–37)
MCV RBC AUTO: 89.1 FL (ref 81–100)
MONOCYTES # BLD AUTO: 0.47 X10(3) UL (ref 0.1–1)
MONOCYTES NFR BLD AUTO: 6.8 %
NEUTROPHIL ABS PRELIM: 4.42 X10 (3) UL (ref 1.3–6.7)
NEUTROPHILS # BLD AUTO: 4.42 X10(3) UL (ref 1.3–6.7)
NEUTROPHILS NFR BLD AUTO: 63.5 %
OSMOLALITY SERPL CALC.SUM OF ELEC: 294 MOSM/KG (ref 275–295)
PLATELET # BLD AUTO: 235 10(3)UL (ref 150–450)
POTASSIUM SERPL-SCNC: 4.3 MMOL/L (ref 3.6–5.1)
RBC # BLD AUTO: 4.66 X10(6)UL (ref 3.8–5.1)
RED CELL DISTRIBUTION WIDTH-SD: 41.5 FL (ref 35.1–46.3)
SODIUM SERPL-SCNC: 142 MMOL/L (ref 136–144)
WBC # BLD AUTO: 7 X10(3) UL (ref 4–13)

## 2019-01-11 PROCEDURE — 96375 TX/PRO/DX INJ NEW DRUG ADDON: CPT

## 2019-01-11 PROCEDURE — 80053 COMPREHEN METABOLIC PANEL: CPT | Performed by: EMERGENCY MEDICINE

## 2019-01-11 PROCEDURE — 96374 THER/PROPH/DIAG INJ IV PUSH: CPT

## 2019-01-11 PROCEDURE — 99285 EMERGENCY DEPT VISIT HI MDM: CPT

## 2019-01-11 PROCEDURE — 85025 COMPLETE CBC W/AUTO DIFF WBC: CPT | Performed by: EMERGENCY MEDICINE

## 2019-01-11 PROCEDURE — 70450 CT HEAD/BRAIN W/O DYE: CPT | Performed by: EMERGENCY MEDICINE

## 2019-01-11 RX ORDER — DIAZEPAM 5 MG/ML
5 INJECTION, SOLUTION INTRAMUSCULAR; INTRAVENOUS ONCE
Status: COMPLETED | OUTPATIENT
Start: 2019-01-11 | End: 2019-01-11

## 2019-01-11 RX ORDER — DIPHENHYDRAMINE HYDROCHLORIDE 50 MG/ML
50 INJECTION INTRAMUSCULAR; INTRAVENOUS ONCE
Status: COMPLETED | OUTPATIENT
Start: 2019-01-11 | End: 2019-01-11

## 2019-01-11 RX ORDER — BUTALBITAL/ASPIRIN/CAFFEINE 50-325-40
1-2 CAPSULE ORAL EVERY 4 HOURS PRN
Qty: 10 CAPSULE | Refills: 0 | Status: SHIPPED | OUTPATIENT
Start: 2019-01-11 | End: 2019-01-18

## 2019-01-11 RX ORDER — METOPROLOL TARTRATE 5 MG/5ML
5 INJECTION INTRAVENOUS ONCE
Status: COMPLETED | OUTPATIENT
Start: 2019-01-11 | End: 2019-01-11

## 2019-01-11 RX ORDER — METOCLOPRAMIDE 10 MG/1
10 TABLET ORAL 3 TIMES DAILY PRN
Qty: 20 TABLET | Refills: 0 | Status: SHIPPED | OUTPATIENT
Start: 2019-01-11 | End: 2019-07-15 | Stop reason: ALTCHOICE

## 2019-01-11 RX ORDER — HYDROMORPHONE HYDROCHLORIDE 1 MG/ML
0.5 INJECTION, SOLUTION INTRAMUSCULAR; INTRAVENOUS; SUBCUTANEOUS ONCE
Status: COMPLETED | OUTPATIENT
Start: 2019-01-11 | End: 2019-01-11

## 2019-01-11 RX ORDER — METOCLOPRAMIDE HYDROCHLORIDE 5 MG/ML
10 INJECTION INTRAMUSCULAR; INTRAVENOUS ONCE
Status: COMPLETED | OUTPATIENT
Start: 2019-01-11 | End: 2019-01-11

## 2019-01-12 NOTE — ED PROVIDER NOTES
Patient Seen in: [facility] Emergency Department    History   Patient presents with:  Headache (neurologic)    Stated Complaint: headache all day, woke up with swelling to forehead this evening.  no injury    HPI    29-year-old female presents to the BP (!) 180/120   Pulse 97   Resp 18   Temp 98.2 °F (36.8 °C)   Temp src Temporal   SpO2 98 %   O2 Device None (Room air)       Current:BP (S) (!) 154/104   Pulse 80   Temp 98.2 °F (36.8 °C) (Temporal)   Resp 16   Wt 99.8 kg   LMP 12/20/2018   SpO2 97%   BM Please view results for these tests on the individual orders.    RAINBOW DRAW BLUE   RAINBOW DRAW LAVENDER   RAINBOW DRAW LIGHT GREEN   RAINBOW DRAW GOLD   CBC W/ DIFFERENTIAL          Ct Brain Or Head (51674)    Result Date: 1/11/2019  CONCLUSION:  The bra [address] [id number]  [phone]    Schedule an appointment as soon as possible for a visit          Medications Prescribed:  Current Discharge Medication List    START taking these medications    Metoclopramide HCl 10 MG Oral Tab  Take 1 tablet (10 mg to

## 2019-01-12 NOTE — ED INITIAL ASSESSMENT (HPI)
Patient here with report of HA all day. Patient took 3 advil and went to sleep and felt better. Patient woke up with edema to her forehead.

## 2019-01-14 ENCOUNTER — OFFICE VISIT (OUTPATIENT)
Dept: FAMILY MEDICINE CLINIC | Facility: CLINIC | Age: 39
End: 2019-01-14

## 2019-01-14 ENCOUNTER — PATIENT MESSAGE (OUTPATIENT)
Dept: FAMILY MEDICINE CLINIC | Facility: CLINIC | Age: 39
End: 2019-01-14

## 2019-01-14 VITALS
DIASTOLIC BLOOD PRESSURE: 90 MMHG | HEIGHT: 66.25 IN | SYSTOLIC BLOOD PRESSURE: 148 MMHG | RESPIRATION RATE: 18 BRPM | BODY MASS INDEX: 37.28 KG/M2 | OXYGEN SATURATION: 98 % | WEIGHT: 232 LBS

## 2019-01-14 DIAGNOSIS — I10 ESSENTIAL HYPERTENSION: ICD-10-CM

## 2019-01-14 DIAGNOSIS — G43.109 MIGRAINE WITH AURA AND WITHOUT STATUS MIGRAINOSUS, NOT INTRACTABLE: ICD-10-CM

## 2019-01-14 DIAGNOSIS — E03.9 HYPOTHYROIDISM, UNSPECIFIED TYPE: Primary | Chronic | ICD-10-CM

## 2019-01-14 DIAGNOSIS — E55.9 HYPOVITAMINOSIS D: ICD-10-CM

## 2019-01-14 PROCEDURE — 99214 OFFICE O/P EST MOD 30 MIN: CPT | Performed by: FAMILY MEDICINE

## 2019-01-14 RX ORDER — METOPROLOL SUCCINATE 100 MG/1
50 TABLET, EXTENDED RELEASE ORAL DAILY
Qty: 90 TABLET | Refills: 3 | Status: SHIPPED | OUTPATIENT
Start: 2019-01-14 | End: 2019-01-15

## 2019-01-14 NOTE — PROGRESS NOTES
This patient is new to me, I take care of her  Merline Connor. She typically follows with Dr. Fabian Tsai. She has also followed with Dr. Masoud Padgett from [facility] for the last 6 years who prescribes her venlafaxine.   She is here in follow-up of an ER vi times daily as needed. Disp: 20 tablet Rfl: 0   Butalbital-APAP-Caff-Cod -53-30 MG Oral Cap Take 1-2 capsules by mouth every 4 (four) hours as needed for Migraine.  Disp: 10 capsule Rfl: 0   ergocalciferol 97662 units Oral Cap TK 1 C PO ONCE A WK Disp consider 1 of these medications in the future. I did discuss that the last medication would counteract the codeine in her migraine pill. Since her migraines have not occurred since last March, I am not switching medications based on migraine prophylaxis.

## 2019-01-14 NOTE — PATIENT INSTRUCTIONS
WEIGHT loss meds:    Phentermine or qsymia:  NO due to rise of BP    Belviq:  Expensive    Victoza: Injectable for diabetes    Contrave:  Combo of Welbutrin/bupropion and Naltrexone. Antidepressant and anti-narcotic.     Home bp readings one or two per da

## 2019-01-15 RX ORDER — METOPROLOL SUCCINATE 100 MG/1
100 TABLET, EXTENDED RELEASE ORAL DAILY
Qty: 90 TABLET | Refills: 3 | Status: SHIPPED | OUTPATIENT
Start: 2019-01-15 | End: 2019-10-04 | Stop reason: ALTCHOICE

## 2019-01-15 NOTE — TELEPHONE ENCOUNTER
From: Traka  To: Rosalee Rea MD  Sent: 1/14/2019 6:38 PM CST  Subject: Prescription Question    Hi Dr. Lizzy Quick. I went to  my new dosage of blood pressure medication this evening. My previous dosage was 50 mg.  The new prescription w

## 2019-01-31 ENCOUNTER — OFFICE VISIT (OUTPATIENT)
Dept: FAMILY MEDICINE CLINIC | Facility: CLINIC | Age: 39
End: 2019-01-31

## 2019-01-31 VITALS
OXYGEN SATURATION: 98 % | TEMPERATURE: 98 F | SYSTOLIC BLOOD PRESSURE: 134 MMHG | HEIGHT: 66.25 IN | WEIGHT: 231 LBS | RESPIRATION RATE: 16 BRPM | DIASTOLIC BLOOD PRESSURE: 88 MMHG | HEART RATE: 91 BPM | BODY MASS INDEX: 37.12 KG/M2

## 2019-01-31 DIAGNOSIS — J01.00 SUBACUTE MAXILLARY SINUSITIS: Primary | ICD-10-CM

## 2019-01-31 DIAGNOSIS — G43.109 MIGRAINE WITH AURA AND WITHOUT STATUS MIGRAINOSUS, NOT INTRACTABLE: ICD-10-CM

## 2019-01-31 DIAGNOSIS — I10 ESSENTIAL HYPERTENSION: ICD-10-CM

## 2019-01-31 PROCEDURE — 99213 OFFICE O/P EST LOW 20 MIN: CPT | Performed by: FAMILY MEDICINE

## 2019-01-31 RX ORDER — CEFDINIR 300 MG/1
300 CAPSULE ORAL 2 TIMES DAILY
Qty: 20 CAPSULE | Refills: 0 | Status: SHIPPED | OUTPATIENT
Start: 2019-01-31 | End: 2019-02-10

## 2019-01-31 NOTE — PROGRESS NOTES
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  Diet: watches calories closely     REVIEW OF SYSTEMS:   GENERAL: feels well otherwise  SKIN: denies any unusual skin lesions  EYES:denies blurred vision or needed.

## 2019-02-18 RX ORDER — DROSPIRENONE AND ETHINYL ESTRADIOL 0.02-3(28)
1 KIT ORAL DAILY
Qty: 3 PACKAGE | Refills: 0 | Status: SHIPPED | OUTPATIENT
Start: 2019-02-18 | End: 2020-05-05

## 2019-02-18 RX ORDER — DROSPIRENONE AND ETHINYL ESTRADIOL TABLETS 0.02-3(28)
KIT ORAL
Qty: 84 TABLET | Refills: 0 | OUTPATIENT
Start: 2019-02-18

## 2019-02-18 NOTE — TELEPHONE ENCOUNTER
Pt due for annual in March. Routed to [facility] staff. Please schedule and route to RN for refill.

## 2019-02-18 NOTE — TELEPHONE ENCOUNTER
Pt scheduled   Future Appointments   Date Time Provider Sidney Medeli   4/2/2019  4:30 PM Surendra Wolff MD [facility] [place]   4/18/2019  4:30 PM Hilda Gleason MD [facility] [place]

## 2019-02-18 NOTE — TELEPHONE ENCOUNTER
Last OV: 03/30/18  Last refill date: 06/21/18  Follow-up: RTC 1 year or PRN  Next appt.: 04/18/19    Sent to pharmacy until annual.

## 2019-04-02 ENCOUNTER — APPOINTMENT (OUTPATIENT)
Dept: LAB | Age: 39
End: 2019-04-02
Attending: FAMILY MEDICINE
Payer: COMMERCIAL

## 2019-04-02 DIAGNOSIS — E03.9 HYPOTHYROIDISM, UNSPECIFIED TYPE: Chronic | ICD-10-CM

## 2019-04-02 PROCEDURE — 36415 COLL VENOUS BLD VENIPUNCTURE: CPT

## 2019-04-02 PROCEDURE — 84443 ASSAY THYROID STIM HORMONE: CPT

## 2019-04-02 PROCEDURE — 84439 ASSAY OF FREE THYROXINE: CPT

## 2019-04-02 RX ORDER — LEVOTHYROXINE SODIUM 175 UG/1
TABLET ORAL
Qty: 90 TABLET | Refills: 1 | OUTPATIENT
Start: 2019-04-02

## 2019-04-03 NOTE — TELEPHONE ENCOUNTER
New BP/migraine medication (metoprolol) , 30 day supply only. Patient to f/u in 4 weeks. If symptoms stable, will give 90 day supply.     Kelli Jensen,   Family Medicine
Pharmacy is requesting a 90 day RX
fall

## 2019-04-04 RX ORDER — LEVOTHYROXINE SODIUM 0.2 MG/1
TABLET ORAL
Qty: 90 TABLET | Refills: 0 | Status: SHIPPED | OUTPATIENT
Start: 2019-04-04 | End: 2019-08-30

## 2019-04-04 NOTE — TELEPHONE ENCOUNTER
Rx Request  Levothyroxine Sodium 200 MCG Oral Tab    Disp:       30             R: 1    Last Visit: 01/31/2019    Last Refilled: 04/03/2019    Protocol Passed?  Yes[ X ]       No[  ]

## 2019-04-18 ENCOUNTER — OFFICE VISIT (OUTPATIENT)
Dept: OBGYN CLINIC | Facility: CLINIC | Age: 39
End: 2019-04-18

## 2019-04-18 VITALS
DIASTOLIC BLOOD PRESSURE: 88 MMHG | SYSTOLIC BLOOD PRESSURE: 124 MMHG | BODY MASS INDEX: 38.89 KG/M2 | WEIGHT: 242 LBS | HEIGHT: 66.25 IN

## 2019-04-18 DIAGNOSIS — Z01.419 ENCOUNTER FOR GYNECOLOGICAL EXAMINATION WITHOUT ABNORMAL FINDING: Primary | ICD-10-CM

## 2019-04-18 PROCEDURE — 99395 PREV VISIT EST AGE 18-39: CPT | Performed by: OBSTETRICS & GYNECOLOGY

## 2019-04-18 RX ORDER — DROSPIRENONE AND ETHINYL ESTRADIOL 0.02-3(28)
1 KIT ORAL DAILY
Qty: 3 PACKAGE | Refills: 3 | Status: SHIPPED | OUTPATIENT
Start: 2019-04-18 | End: 2019-07-15

## 2019-04-18 NOTE — PROGRESS NOTES
Patient ID:   Sariah Peacock  is a 41year old female         HPI:     Here for annual gyn exam. Last seen 2018. Notes from that encounter reviewed. New medical/surgical hx:  None.  Increase in thyroid medication based on recent labs to ONCE A WK Disp:  Rfl: 2     No current facility-administered medications on file prior to visit. PHYSICAL EXAM:    Physical Exam   /88   Ht 66.25\"   Wt 242 lb   LMP 04/04/2019 (Approximate)   BMI 38.77 kg/m²   Neck:  Supple.  Thyroid:  normal.  No

## 2019-07-15 ENCOUNTER — OFFICE VISIT (OUTPATIENT)
Dept: FAMILY MEDICINE CLINIC | Facility: CLINIC | Age: 39
End: 2019-07-15

## 2019-07-15 VITALS
HEIGHT: 66.25 IN | OXYGEN SATURATION: 98 % | BODY MASS INDEX: 39.7 KG/M2 | TEMPERATURE: 98 F | WEIGHT: 247 LBS | DIASTOLIC BLOOD PRESSURE: 78 MMHG | RESPIRATION RATE: 18 BRPM | SYSTOLIC BLOOD PRESSURE: 122 MMHG | HEART RATE: 95 BPM

## 2019-07-15 DIAGNOSIS — E66.9 OBESITY (BMI 35.0-39.9 WITHOUT COMORBIDITY): Primary | ICD-10-CM

## 2019-07-15 PROCEDURE — 99213 OFFICE O/P EST LOW 20 MIN: CPT | Performed by: FAMILY MEDICINE

## 2019-07-15 NOTE — PROGRESS NOTES
Here for referral to weight loss clinic. Is treated for hypothyroidism. Most recent lab was normal.  Follows with Dr. Sofía Iglesias. Was on phentermine which seem to be helping but had to stop it because of her anxiety.   When she stopped at the weight balloon treatments. Patient cannot tolerate phentermine because of her anxiety.

## 2019-07-31 ENCOUNTER — OFFICE VISIT (OUTPATIENT)
Dept: INTERNAL MEDICINE CLINIC | Facility: CLINIC | Age: 39
End: 2019-07-31

## 2019-07-31 VITALS
HEIGHT: 66.5 IN | HEART RATE: 80 BPM | DIASTOLIC BLOOD PRESSURE: 86 MMHG | WEIGHT: 245 LBS | BODY MASS INDEX: 38.91 KG/M2 | RESPIRATION RATE: 16 BRPM | SYSTOLIC BLOOD PRESSURE: 128 MMHG

## 2019-07-31 DIAGNOSIS — E03.9 HYPOTHYROIDISM, UNSPECIFIED TYPE: ICD-10-CM

## 2019-07-31 DIAGNOSIS — Z51.81 THERAPEUTIC DRUG MONITORING: Primary | ICD-10-CM

## 2019-07-31 DIAGNOSIS — F41.1 GAD (GENERALIZED ANXIETY DISORDER): ICD-10-CM

## 2019-07-31 DIAGNOSIS — E66.9 CLASS 2 OBESITY WITHOUT SERIOUS COMORBIDITY WITH BODY MASS INDEX (BMI) OF 38.0 TO 38.9 IN ADULT, UNSPECIFIED OBESITY TYPE: ICD-10-CM

## 2019-07-31 PROBLEM — E66.812 CLASS 2 OBESITY WITHOUT SERIOUS COMORBIDITY WITH BODY MASS INDEX (BMI) OF 38.0 TO 38.9 IN ADULT: Status: ACTIVE | Noted: 2019-07-31

## 2019-07-31 PROCEDURE — 99215 OFFICE O/P EST HI 40 MIN: CPT | Performed by: NURSE PRACTITIONER

## 2019-07-31 RX ORDER — TOPIRAMATE 25 MG/1
25 TABLET ORAL 2 TIMES DAILY
Qty: 60 TABLET | Refills: 2 | Status: SHIPPED | OUTPATIENT
Start: 2019-07-31 | End: 2019-08-30

## 2019-07-31 NOTE — PATIENT INSTRUCTIONS
PLAN:  Continue with medications: Topamax: take 1 tablet before dinner for the first week (to decrease side effects) and than the second week--> increase to 1 tablet in the morning and 1 tablet before dinner (2 tablets per day)   Go to the lab for blood wo exercise/activity which takes 7 minutes of your day and that you can do at home! 3. \"Calm\" or \"Headspace\" which helps with mindfulness, meditation, clarity, sleep, and cheryle to your daily life.

## 2019-07-31 NOTE — PROGRESS NOTES
HISTORY OF PRESENT ILLNESS  Patient presents with:  Weight Check:  referral. phentermine in past with dr Ameena Price. no regular exercise    Rogelio Saucedo is a 41year old female new to our office today for initiation of medical weight loss program.  P integrity:6  Hours per night    MEDICAL HISTORY  PMH reviewed:   Cardiac disorders: HTN  Diabetes: negative  Thyroid disease: hypothyroid   Renal disease: negative   Kidney stones: negative  Liver disease: negative  Joint-related conditions:negative  Migra ALKPHO 73 01/11/2019    AST 20 01/11/2019    ALT 24 01/11/2019    BILT 0.1 01/11/2019    TP 7.1 01/11/2019    ALB 3.3 01/11/2019    GLOBULIN 3.8 01/11/2019     01/11/2019    K 4.3 01/11/2019     01/11/2019    CO2 26.0 01/11/2019     No results Weight: 245 lbs  5% Goal: 12.25  10% Goal: 24.5  Total Weight Loss: 0 lbs  Initial consult:245  lbs on 7/31/2019, Down  Lb:  lbs total     Reviewed  [facility] patient contract.  Readiness for Lifestyle change: 10/10, Interest in Medication: 10/10, Surgery interes (150-300 minutes/ week in active weight loss)   · Discussed the role of sleep and stress in weight management  · Weight Loss Contract reviewed and signed today 7/31/2019    Labs ordered today:  a1c,     Patient Instructions   PLAN:  Continue with medicatio ** Daily INPUT> Look at nutrition section-- \"nutrients\" and it will break down your macros for the day (ie. Protein, carbs, fibers, sugars and fats). Try to stay within these numbers daily     2.  \"7 minute workout\" to help with exercise/activity which

## 2019-08-26 ENCOUNTER — LAB ENCOUNTER (OUTPATIENT)
Dept: LAB | Age: 39
End: 2019-08-26
Attending: NURSE PRACTITIONER
Payer: COMMERCIAL

## 2019-08-26 DIAGNOSIS — E66.9 CLASS 2 OBESITY WITHOUT SERIOUS COMORBIDITY WITH BODY MASS INDEX (BMI) OF 38.0 TO 38.9 IN ADULT, UNSPECIFIED OBESITY TYPE: ICD-10-CM

## 2019-08-26 DIAGNOSIS — E03.9 HYPOTHYROIDISM, UNSPECIFIED TYPE: ICD-10-CM

## 2019-08-26 DIAGNOSIS — Z51.81 THERAPEUTIC DRUG MONITORING: ICD-10-CM

## 2019-08-26 LAB
EST. AVERAGE GLUCOSE BLD GHB EST-MCNC: 97 MG/DL (ref 68–126)
HBA1C MFR BLD HPLC: 5 % (ref ?–5.7)
T4 FREE SERPL-MCNC: 1.3 NG/DL (ref 0.8–1.7)
TSI SER-ACNC: 0.27 MIU/ML (ref 0.36–3.74)

## 2019-08-26 PROCEDURE — 83036 HEMOGLOBIN GLYCOSYLATED A1C: CPT

## 2019-08-26 PROCEDURE — 84443 ASSAY THYROID STIM HORMONE: CPT

## 2019-08-26 PROCEDURE — 36415 COLL VENOUS BLD VENIPUNCTURE: CPT

## 2019-08-26 PROCEDURE — 84439 ASSAY OF FREE THYROXINE: CPT

## 2019-08-27 RX ORDER — LEVOTHYROXINE SODIUM 175 UG/1
TABLET ORAL
Qty: 90 TABLET | Refills: 0 | OUTPATIENT
Start: 2019-08-27

## 2019-08-27 NOTE — TELEPHONE ENCOUNTER
80 Day Request  Labs Due  Temp supply sent to pharmacy      Rx Request  Levothyroxine Sodium 175 MCG Oral Tab    Disp:      90             R: 0    Last Visit: 07/15/2019 (Yamel)    Last Refilled: 08/27/2019    Protocol Passed?  Yecenia Claudio  ]       No[ X ]

## 2019-08-30 ENCOUNTER — OFFICE VISIT (OUTPATIENT)
Dept: INTERNAL MEDICINE CLINIC | Facility: CLINIC | Age: 39
End: 2019-08-30

## 2019-08-30 VITALS
WEIGHT: 242 LBS | BODY MASS INDEX: 38.43 KG/M2 | HEIGHT: 66.5 IN | HEART RATE: 74 BPM | SYSTOLIC BLOOD PRESSURE: 124 MMHG | DIASTOLIC BLOOD PRESSURE: 72 MMHG | RESPIRATION RATE: 14 BRPM

## 2019-08-30 DIAGNOSIS — F41.1 GAD (GENERALIZED ANXIETY DISORDER): ICD-10-CM

## 2019-08-30 DIAGNOSIS — E66.9 CLASS 2 OBESITY WITHOUT SERIOUS COMORBIDITY WITH BODY MASS INDEX (BMI) OF 38.0 TO 38.9 IN ADULT, UNSPECIFIED OBESITY TYPE: ICD-10-CM

## 2019-08-30 DIAGNOSIS — E03.9 HYPOTHYROIDISM, UNSPECIFIED TYPE: ICD-10-CM

## 2019-08-30 DIAGNOSIS — Z51.81 THERAPEUTIC DRUG MONITORING: Primary | ICD-10-CM

## 2019-08-30 PROCEDURE — 99214 OFFICE O/P EST MOD 30 MIN: CPT | Performed by: NURSE PRACTITIONER

## 2019-08-30 RX ORDER — ERGOCALCIFEROL 1.25 MG/1
1 CAPSULE ORAL WEEKLY
Refills: 2 | COMMUNITY
Start: 2019-08-25 | End: 2020-02-12 | Stop reason: ALTCHOICE

## 2019-08-30 NOTE — PROGRESS NOTES
HISTORY OF PRESENT ILLNESS  Patient presents with:  Weight Check: down 3 , did not start topamax    Tere Mckeon is a 41year old female here for follow up with medical weight loss program for the treatment of overweight, obesity, or morbid obesity.  P Cardiac disorders: HTN  Diabetes: negative  Thyroid disease: hypothyroid   Renal disease: negative   Kidney stones: negative  Liver disease: negative  Joint-related conditions:negative  Migraines/seizures: +migraines   Glaucoma: negative   Depression/anx 04/18/2016    Vicky 197 (H) 09/20/2018     Lab Results   Component Value Date    B12 545 09/20/2018     Lab Results   Component Value Date    VITD 18.4 (L) 09/20/2018         Current Outpatient Medications on File Prior to Visit:  Levothyroxine Sodium 17 goal of weight loss 5% in 3 months and 10% in 6 months    Abnormal labs: vitamin d deficiency  EKG was not done in the office  Low muscle mass 12.6% strength training encouraged     PLAN   Schedule appt with nutritionist, start using MFP or paper charting

## 2019-08-30 NOTE — PATIENT INSTRUCTIONS
PLAN:  Follow up with me in 1 month  Schedule follow up appointments:  Dietitian/nutritionist      Please try to work on the following dietary changes:  1. Drink lots of water and cut down on soda/juice consumption if soda/juice drinker  2.  Eat breakfast d

## 2019-10-03 ENCOUNTER — PATIENT MESSAGE (OUTPATIENT)
Dept: FAMILY MEDICINE CLINIC | Facility: CLINIC | Age: 39
End: 2019-10-03

## 2019-10-03 NOTE — TELEPHONE ENCOUNTER
From: Julio Carpenter  To: Evelyn Jones DO  Sent: 10/3/2019 9:22 AM CDT  Subject: Referral Request    I woke up on Tuesday with pain in my right eye. The lower eyelid is inflamed and painful. The eyeball is not red and there is no discharge.  (It is not

## 2019-10-04 ENCOUNTER — OFFICE VISIT (OUTPATIENT)
Dept: FAMILY MEDICINE CLINIC | Facility: CLINIC | Age: 39
End: 2019-10-04

## 2019-10-04 VITALS
HEIGHT: 66.5 IN | SYSTOLIC BLOOD PRESSURE: 126 MMHG | DIASTOLIC BLOOD PRESSURE: 84 MMHG | OXYGEN SATURATION: 98 % | RESPIRATION RATE: 20 BRPM | HEART RATE: 68 BPM | TEMPERATURE: 97 F | BODY MASS INDEX: 39.23 KG/M2 | WEIGHT: 247 LBS

## 2019-10-04 DIAGNOSIS — H02.89 PAIN AND SWELLING OF LOWER EYELID OF RIGHT EYE: Primary | ICD-10-CM

## 2019-10-04 DIAGNOSIS — H02.842 PAIN AND SWELLING OF LOWER EYELID OF RIGHT EYE: Primary | ICD-10-CM

## 2019-10-04 PROCEDURE — 99212 OFFICE O/P EST SF 10 MIN: CPT | Performed by: PHYSICIAN ASSISTANT

## 2019-10-04 RX ORDER — OFLOXACIN 3 MG/ML
1 SOLUTION/ DROPS OPHTHALMIC 4 TIMES DAILY
Qty: 5 ML | Refills: 0 | Status: SHIPPED | OUTPATIENT
Start: 2019-10-04 | End: 2019-11-14 | Stop reason: ALTCHOICE

## 2019-10-04 NOTE — PROGRESS NOTES
HPI:    Patient ID: Jaden Cui is a 41year old female. HPI   Patient presents today with concerns of right lower lid pain and swelling that began Tuesday. States she woke up with symptoms and states her eye felt droopy.   Symptoms improved as th and no hordeolum. No foreign body present in the left eye. Erythema, swelling, and tenderness of the right lower lid. ASSESSMENT/PLAN:   1.  Pain and swelling of lower eyelid of right eye  Here with 3 to 4 days of right lower lid pain and swe

## 2019-10-11 RX ORDER — LEVOTHYROXINE SODIUM 175 UG/1
TABLET ORAL
Qty: 30 TABLET | Refills: 0 | Status: SHIPPED | OUTPATIENT
Start: 2019-10-11 | End: 2019-11-21

## 2019-10-11 NOTE — TELEPHONE ENCOUNTER
Last ov 10/4/19  Last refill 8/27/19      .   Thyroid:    Lab Results   Component Value Date    TSH 0.272 (L) 08/26/2019    TSH 3.240 04/02/2019    TSH 0.736 09/20/2018    T4F 1.3 08/26/2019    T4F 1.0 04/02/2019    T4F 1.3 09/20/2018

## 2019-11-09 ENCOUNTER — PATIENT MESSAGE (OUTPATIENT)
Dept: FAMILY MEDICINE CLINIC | Facility: CLINIC | Age: 39
End: 2019-11-09

## 2019-11-09 DIAGNOSIS — H57.11 EYE PAIN, RIGHT: Primary | ICD-10-CM

## 2019-11-11 NOTE — TELEPHONE ENCOUNTER
From: Cathy Metcalf  To: Romeo Carrero PA-C  Sent: 11/9/2019 9:06 AM CST  Subject: Referral Request    I'd like a referral to an ophthalmologist for my right eye. I'm still struggling with redness, pain, and puffiness.

## 2019-11-14 ENCOUNTER — OFFICE VISIT (OUTPATIENT)
Dept: FAMILY MEDICINE CLINIC | Facility: CLINIC | Age: 39
End: 2019-11-14

## 2019-11-14 VITALS
BODY MASS INDEX: 38.36 KG/M2 | OXYGEN SATURATION: 98 % | RESPIRATION RATE: 16 BRPM | TEMPERATURE: 98 F | HEART RATE: 78 BPM | HEIGHT: 67 IN | WEIGHT: 244.38 LBS | SYSTOLIC BLOOD PRESSURE: 130 MMHG | DIASTOLIC BLOOD PRESSURE: 100 MMHG

## 2019-11-14 DIAGNOSIS — J01.00 ACUTE NON-RECURRENT MAXILLARY SINUSITIS: Primary | ICD-10-CM

## 2019-11-14 PROCEDURE — 99213 OFFICE O/P EST LOW 20 MIN: CPT | Performed by: NURSE PRACTITIONER

## 2019-11-14 RX ORDER — METOPROLOL TARTRATE 100 MG/1
100 TABLET ORAL 2 TIMES DAILY
COMMUNITY
End: 2020-12-14

## 2019-11-14 RX ORDER — FLUTICASONE PROPIONATE 50 MCG
2 SPRAY, SUSPENSION (ML) NASAL DAILY
Qty: 1 BOTTLE | Refills: 0 | Status: SHIPPED | OUTPATIENT
Start: 2019-11-14 | End: 2020-02-12 | Stop reason: ALTCHOICE

## 2019-11-14 RX ORDER — AMOXICILLIN AND CLAVULANATE POTASSIUM 875; 125 MG/1; MG/1
1 TABLET, FILM COATED ORAL 2 TIMES DAILY
Qty: 20 TABLET | Refills: 0 | Status: SHIPPED | OUTPATIENT
Start: 2019-11-14 | End: 2019-11-24

## 2019-11-14 NOTE — PROGRESS NOTES
Winston Mendoza is a 42year old female.    Patient presents with:  Nasal Congestion: congestion, headache, x2 wks bodyaches, bl ear pain, pnd, sinus pressure    HPI:    Symptoms started  2 weeks ago with purulent nasal discharge, maxillary sinus pressu (!) 130/100 (BP Location: Right arm, Patient Position: Sitting, Cuff Size: large)   Pulse 78   Temp 97.7 °F (36.5 °C) (Oral)   Resp 16   Ht 67\"   Wt 244 lb 6.4 oz (110.9 kg)   LMP 10/24/2019   SpO2 98%   BMI 38.28 kg/m²   General: WD/WN in no acute distre

## 2019-11-19 ENCOUNTER — APPOINTMENT (OUTPATIENT)
Dept: LAB | Age: 39
End: 2019-11-19
Attending: FAMILY MEDICINE
Payer: COMMERCIAL

## 2019-11-19 DIAGNOSIS — E03.8 OTHER SPECIFIED HYPOTHYROIDISM: ICD-10-CM

## 2019-11-19 PROCEDURE — 36415 COLL VENOUS BLD VENIPUNCTURE: CPT

## 2019-11-19 PROCEDURE — 84439 ASSAY OF FREE THYROXINE: CPT

## 2019-11-19 PROCEDURE — 84443 ASSAY THYROID STIM HORMONE: CPT

## 2019-11-20 ENCOUNTER — PATIENT MESSAGE (OUTPATIENT)
Dept: FAMILY MEDICINE CLINIC | Facility: CLINIC | Age: 39
End: 2019-11-20

## 2019-11-21 NOTE — TELEPHONE ENCOUNTER
From: Abdi Medina  To: Shahrzad Osman DO  Sent: 11/20/2019 7:04 AM CST  Subject: Test Results Question    I need a refill of my Synthroid. Please let me know if I need to adjust the dosage. Then please send the prescription to my pharmacy. Thank you!

## 2019-11-21 NOTE — TELEPHONE ENCOUNTER
From: Bee Suarez  To: Kathrine Pierson DO  Sent: 11/20/2019 12:13 PM CST  Subject: Test Results Question    A nurse named Clair Rowe called me today to discuss my recent blood test results.  I've been trying to call the office for the past 30 minutes and no on

## 2020-01-17 ENCOUNTER — APPOINTMENT (OUTPATIENT)
Dept: LAB | Age: 40
End: 2020-01-17
Attending: FAMILY MEDICINE
Payer: COMMERCIAL

## 2020-01-17 DIAGNOSIS — E03.9 HYPOTHYROIDISM, UNSPECIFIED TYPE: ICD-10-CM

## 2020-01-17 LAB
T4 FREE SERPL-MCNC: 1.2 NG/DL (ref 0.8–1.7)
TSI SER-ACNC: 1.12 MIU/ML (ref 0.36–3.74)

## 2020-01-17 PROCEDURE — 36415 COLL VENOUS BLD VENIPUNCTURE: CPT

## 2020-01-17 PROCEDURE — 84443 ASSAY THYROID STIM HORMONE: CPT

## 2020-01-17 PROCEDURE — 84439 ASSAY OF FREE THYROXINE: CPT

## 2020-01-20 ENCOUNTER — OFFICE VISIT (OUTPATIENT)
Dept: FAMILY MEDICINE CLINIC | Facility: CLINIC | Age: 40
End: 2020-01-20

## 2020-01-20 ENCOUNTER — APPOINTMENT (OUTPATIENT)
Dept: LAB | Age: 40
End: 2020-01-20
Attending: FAMILY MEDICINE
Payer: COMMERCIAL

## 2020-01-20 VITALS
DIASTOLIC BLOOD PRESSURE: 90 MMHG | RESPIRATION RATE: 18 BRPM | BODY MASS INDEX: 37.98 KG/M2 | HEIGHT: 67 IN | SYSTOLIC BLOOD PRESSURE: 140 MMHG | OXYGEN SATURATION: 98 % | WEIGHT: 242 LBS | HEART RATE: 93 BPM

## 2020-01-20 DIAGNOSIS — E03.9 HYPOTHYROIDISM, UNSPECIFIED TYPE: Chronic | ICD-10-CM

## 2020-01-20 DIAGNOSIS — E03.9 HYPOTHYROIDISM, UNSPECIFIED TYPE: Primary | Chronic | ICD-10-CM

## 2020-01-20 LAB
THYROGLOB SERPL-MCNC: 24 U/ML (ref ?–60)
THYROPEROXIDASE AB SERPL-ACNC: 1241 U/ML (ref ?–60)

## 2020-01-20 PROCEDURE — 36415 COLL VENOUS BLD VENIPUNCTURE: CPT

## 2020-01-20 PROCEDURE — 86800 THYROGLOBULIN ANTIBODY: CPT

## 2020-01-20 PROCEDURE — 86376 MICROSOMAL ANTIBODY EACH: CPT

## 2020-01-20 PROCEDURE — 99213 OFFICE O/P EST LOW 20 MIN: CPT | Performed by: FAMILY MEDICINE

## 2020-01-20 NOTE — PROGRESS NOTES
Patient treated for hypothyroidism. August blood draw shows a TSH that was slightly low at 0.272 with normal TSH. No medication changes were made. In November her TSH had risen to 6.6.   Free T4 was still normal.  Medication was changed to an alternating Take 1 tablet by mouth daily.  3 Package 0     ALLERGIES:   Levaquin [Levofloxacin]  FAMILY HISTORY  Family History   Problem Relation Age of Onset   • Hypertension Father    • Depression Father    • Other (hypothryroid) Father    • Hypertension Mother    •

## 2020-01-21 DIAGNOSIS — E06.3 HYPOTHYROIDISM DUE TO HASHIMOTO'S THYROIDITIS: Primary | Chronic | ICD-10-CM

## 2020-01-21 DIAGNOSIS — E03.8 HYPOTHYROIDISM DUE TO HASHIMOTO'S THYROIDITIS: Primary | Chronic | ICD-10-CM

## 2020-01-21 NOTE — TELEPHONE ENCOUNTER
Rx Request  Levothyroxine Sodium 175 MCG Oral Tab  Levothyroxine Sodium 200 MCG Oral Tab    Disp:    60                R: 0    Last Visit: 01/2o/20    Last Refilled: 11/21/2019

## 2020-01-22 RX ORDER — LEVOTHYROXINE SODIUM 175 UG/1
TABLET ORAL
Qty: 60 TABLET | Refills: 2 | Status: ON HOLD | OUTPATIENT
Start: 2020-01-22 | End: 2021-03-02

## 2020-01-22 RX ORDER — LEVOTHYROXINE SODIUM 0.2 MG/1
TABLET ORAL
Qty: 60 TABLET | Refills: 2 | Status: SHIPPED | OUTPATIENT
Start: 2020-01-22 | End: 2021-01-25

## 2020-01-27 ENCOUNTER — OFFICE VISIT (OUTPATIENT)
Dept: FAMILY MEDICINE CLINIC | Facility: CLINIC | Age: 40
End: 2020-01-27

## 2020-01-27 VITALS
TEMPERATURE: 98 F | HEART RATE: 84 BPM | BODY MASS INDEX: 38.45 KG/M2 | WEIGHT: 245 LBS | DIASTOLIC BLOOD PRESSURE: 76 MMHG | HEIGHT: 67 IN | OXYGEN SATURATION: 98 % | RESPIRATION RATE: 16 BRPM | SYSTOLIC BLOOD PRESSURE: 130 MMHG

## 2020-01-27 DIAGNOSIS — J11.1 FLU: ICD-10-CM

## 2020-01-27 DIAGNOSIS — J02.9 SORE THROAT: Primary | ICD-10-CM

## 2020-01-27 LAB
CONTROL LINE PRESENT WITH A CLEAR BACKGROUND (YES/NO): YES YES/NO
KIT LOT #: NORMAL NUMERIC
POCT INFLUENZA A: NEGATIVE
POCT INFLUENZA B: NEGATIVE

## 2020-01-27 PROCEDURE — 87502 INFLUENZA DNA AMP PROBE: CPT | Performed by: NURSE PRACTITIONER

## 2020-01-27 PROCEDURE — 99213 OFFICE O/P EST LOW 20 MIN: CPT | Performed by: NURSE PRACTITIONER

## 2020-01-27 PROCEDURE — 87880 STREP A ASSAY W/OPTIC: CPT | Performed by: NURSE PRACTITIONER

## 2020-01-27 NOTE — PROGRESS NOTES
CHIEF COMPLAINT:   Patient presents with:  Sore Throat: sore throat, cough, fever ( 100.1) headaches, bodyaches sob  ( 1 day)       HPI:   Jacquelin Ferro is a 42year old female who presents for upper respiratory symptoms for  1 days.  Patient reports so •  SECTION N/A 2013    Performed by Denise Paige MD at [facility] L[facility] OR   • OTHER SURGICAL HISTORY  2000    Ovarian cyst         Social History    Tobacco Use      Smoking status: Never Smoker      Smokeless tobacco: Never Used    Alcohol use: INFLUENZA DNA AMP PROBE    Collection Time: 01/27/20  4:45 PM   Result Value Ref Range    POCT INFLUENZA A Negative Negative    POCT INFLUENZA B Negative Negative    POCT Lot Number [id number]     POCT Expiration Date 7/10/20     POCT Procedure Control Contro · You may use acetaminophen or ibuprofen to control pain and fever, unless another medicine was prescribed. If you have chronic liver or kidney disease, have ever had a stomach ulcer or gastrointestinal bleeding, or are taking blood-thinning medicines, nadja © 2144-6422 [employer] [address]. [address] [facility], [address] [place]. All rights reserved. This information is not intended as a substitute for professional medical care. Always follow your healthcare professional's instructions.             The

## 2020-02-12 ENCOUNTER — OFFICE VISIT (OUTPATIENT)
Dept: FAMILY MEDICINE CLINIC | Facility: CLINIC | Age: 40
End: 2020-02-12

## 2020-02-12 VITALS
TEMPERATURE: 98 F | RESPIRATION RATE: 16 BRPM | DIASTOLIC BLOOD PRESSURE: 86 MMHG | BODY MASS INDEX: 38.14 KG/M2 | HEIGHT: 67 IN | SYSTOLIC BLOOD PRESSURE: 132 MMHG | HEART RATE: 79 BPM | OXYGEN SATURATION: 99 % | WEIGHT: 243 LBS

## 2020-02-12 DIAGNOSIS — H61.22 IMPACTED CERUMEN OF LEFT EAR: ICD-10-CM

## 2020-02-12 DIAGNOSIS — J01.10 ACUTE NON-RECURRENT FRONTAL SINUSITIS: Primary | ICD-10-CM

## 2020-02-12 PROCEDURE — 99214 OFFICE O/P EST MOD 30 MIN: CPT | Performed by: PHYSICIAN ASSISTANT

## 2020-02-12 RX ORDER — CEFDINIR 300 MG/1
300 CAPSULE ORAL 2 TIMES DAILY
Qty: 20 CAPSULE | Refills: 0 | Status: SHIPPED | OUTPATIENT
Start: 2020-02-12 | End: 2020-02-22

## 2020-02-12 NOTE — PROGRESS NOTES
Jaime Palacios is a 42year old female. Patient presents with:  URI      HPI:   Patient presents today complaining of URI and sinus symptoms for almost 3 weeks.   She has severe facial and sinus pressure, ear pain, cough productive of green sputum, epis Social History:  Social History    Tobacco Use      Smoking status: Never Smoker      Smokeless tobacco: Never Used    Alcohol use:  Yes      Alcohol/week: 0.0 standard drinks      Comment: very rare    Drug use: No       REVIEW OF SYSTEMS:   GENERAL HEAL The patient indicates understanding of these issues and agrees to the plan. No follow-ups on file.       Sophia Vazquez PA-C  2/12/2020

## 2020-02-22 DIAGNOSIS — I10 ESSENTIAL HYPERTENSION: ICD-10-CM

## 2020-02-22 DIAGNOSIS — G43.109 MIGRAINE WITH AURA AND WITHOUT STATUS MIGRAINOSUS, NOT INTRACTABLE: ICD-10-CM

## 2020-02-24 RX ORDER — METOPROLOL SUCCINATE 100 MG/1
TABLET, EXTENDED RELEASE ORAL
Qty: 90 TABLET | Refills: 3 | Status: SHIPPED | OUTPATIENT
Start: 2020-02-24 | End: 2021-02-25

## 2020-04-30 ENCOUNTER — PATIENT MESSAGE (OUTPATIENT)
Dept: FAMILY MEDICINE CLINIC | Facility: CLINIC | Age: 40
End: 2020-04-30

## 2020-04-30 DIAGNOSIS — J02.9 PHARYNGITIS, UNSPECIFIED ETIOLOGY: ICD-10-CM

## 2020-04-30 DIAGNOSIS — R50.9 FEBRILE ILLNESS: Primary | ICD-10-CM

## 2020-05-01 ENCOUNTER — TELEPHONE (OUTPATIENT)
Dept: FAMILY MEDICINE CLINIC | Facility: CLINIC | Age: 40
End: 2020-05-01

## 2020-05-01 DIAGNOSIS — J02.9 ACUTE PHARYNGITIS, UNSPECIFIED ETIOLOGY: ICD-10-CM

## 2020-05-01 DIAGNOSIS — R50.9 FEVER, UNSPECIFIED: Primary | ICD-10-CM

## 2020-05-01 NOTE — TELEPHONE ENCOUNTER
From: Kamar Green  To: Marilu Lou MD  Sent: 4/30/2020 6:54 PM CDT  Subject: Other    Hi Dr. Coy Hopper. I'd like orders to have the covid antibodies test done. If you look back in my chart, I was so sick back in late [address] February.  I went

## 2020-05-05 ENCOUNTER — TELEPHONE (OUTPATIENT)
Dept: OBGYN CLINIC | Facility: CLINIC | Age: 40
End: 2020-05-05

## 2020-05-05 RX ORDER — DROSPIRENONE AND ETHINYL ESTRADIOL 0.02-3(28)
1 KIT ORAL DAILY
Qty: 3 PACKAGE | Refills: 0 | Status: ON HOLD | OUTPATIENT
Start: 2020-05-05 | End: 2021-03-02

## 2020-05-05 NOTE — TELEPHONE ENCOUNTER
Last OV: 4/18/19 with Dr. Casi Neal for annual  Last refill date: 2/18/19  Follow-up: 1 year  Next appt. : 7/10/20 with KB    Refill sent.

## 2020-07-09 NOTE — PROGRESS NOTES
GYN H&P     2020  1:06 PM    CC: Patient is here for annual exam    HPI: patient is a 42year old  here for her annual exam  She reports she is doing well. Review of chart and interview of patient reveals well controlled CHTN on metoprolol.  She wk, then 1 tab PO BID, Disp: 180 tablet, Rfl: 1  ALPRAZolam (XANAX) 0.25 MG Oral Tab, Take 1 tablet (0.25 mg total) by mouth 3 (three) times daily as needed. , Disp: 30 tablet, Rfl: 1  Venlafaxine HCl  MG Oral Capsule SR 24 Hr, Take 2 capsules (300 mg distension and no mass. There is no hepatosplenomegaly. There is no tenderness. There is no rebound and no guarding. No hernia. Genitourinary:    Vagina and uterus normal.   No breast swelling, tenderness, discharge or bleeding. No labial fusion.  There i transition to a progesterone only birth control pill. She is extremely hesitant and nervous about this. We reviewed the rationale behind this. We reviewed other options, such as progesterone only pill, depo, IUD. She will consider her options.  We also revi

## 2020-07-10 ENCOUNTER — OFFICE VISIT (OUTPATIENT)
Dept: OBGYN CLINIC | Facility: CLINIC | Age: 40
End: 2020-07-10

## 2020-07-10 VITALS
BODY MASS INDEX: 39.74 KG/M2 | DIASTOLIC BLOOD PRESSURE: 84 MMHG | SYSTOLIC BLOOD PRESSURE: 122 MMHG | HEART RATE: 98 BPM | TEMPERATURE: 98 F | HEIGHT: 67 IN | WEIGHT: 253.19 LBS

## 2020-07-10 DIAGNOSIS — Z30.41 ORAL CONTRACEPTIVE USE: ICD-10-CM

## 2020-07-10 DIAGNOSIS — Z01.419 WELL WOMAN EXAM WITH ROUTINE GYNECOLOGICAL EXAM: Primary | ICD-10-CM

## 2020-07-10 DIAGNOSIS — E66.9 CLASS 2 OBESITY WITHOUT SERIOUS COMORBIDITY WITH BODY MASS INDEX (BMI) OF 38.0 TO 38.9 IN ADULT, UNSPECIFIED OBESITY TYPE: ICD-10-CM

## 2020-07-10 DIAGNOSIS — I10 ESSENTIAL HYPERTENSION: ICD-10-CM

## 2020-07-10 PROBLEM — G43.109 MIGRAINE WITH AURA: Status: RESOLVED | Noted: 2018-03-08 | Resolved: 2020-07-10

## 2020-07-10 PROCEDURE — 99395 PREV VISIT EST AGE 18-39: CPT | Performed by: OBSTETRICS & GYNECOLOGY

## 2020-07-22 ENCOUNTER — TELEMEDICINE (OUTPATIENT)
Dept: FAMILY MEDICINE CLINIC | Facility: CLINIC | Age: 40
End: 2020-07-22

## 2020-07-22 DIAGNOSIS — R19.7 DIARRHEA, UNSPECIFIED TYPE: Primary | ICD-10-CM

## 2020-07-22 PROCEDURE — 99213 OFFICE O/P EST LOW 20 MIN: CPT | Performed by: PHYSICIAN ASSISTANT

## 2020-07-22 NOTE — PROGRESS NOTES
Video Visit    Angeles Rowe is a 42year old female. No chief complaint on file. HPI:   Patient with history of hypothyroidism, hypertension, depression, anxiety, and obesity presents complaining of 3 to 6 months of abdominal pain and diarrhea. mouth 2 (two) times daily.         Past Medical History:   Diagnosis Date   • Allergic rhinitis    • Depression    • Hx of migraines    • Hypothyroidism 2004   • Migraines    • Pre-eclampsia    • Unspecified essential hypertension    • Unspecified hypothyro WITH PLATELET, COMP         METABOLIC PANEL (14), FREE T3         (TRIIODOTHYRONINE), ASSAY, THYROID STIM         HORMONE, FREE T4 (FREE THYROXINE), TISSUE         TRANSGLUTAMINASE AB IGG        Etiology unclear but the pattern in which she experiences sym spent reviewing labs, medications, radiology tests and decision making. Appropriate medical decision-making and tests are ordered as detailed in the plan of care above.   Coding/billing information is submitted for this visit based on complexity of care and

## 2020-07-23 ENCOUNTER — LAB ENCOUNTER (OUTPATIENT)
Dept: LAB | Age: 40
End: 2020-07-23
Attending: PHYSICIAN ASSISTANT
Payer: COMMERCIAL

## 2020-07-23 DIAGNOSIS — R19.7 DIARRHEA, UNSPECIFIED TYPE: ICD-10-CM

## 2020-07-23 LAB
ALBUMIN SERPL-MCNC: 3.4 G/DL (ref 3.4–5)
ALBUMIN/GLOB SERPL: 0.9 {RATIO} (ref 1–2)
ALP LIVER SERPL-CCNC: 69 U/L (ref 37–98)
ALT SERPL-CCNC: 29 U/L (ref 13–56)
ANION GAP SERPL CALC-SCNC: 6 MMOL/L (ref 0–18)
AST SERPL-CCNC: 25 U/L (ref 15–37)
BASOPHILS # BLD AUTO: 0 X10(3) UL (ref 0–0.2)
BASOPHILS NFR BLD AUTO: 0 %
BILIRUB SERPL-MCNC: 0.3 MG/DL (ref 0.1–2)
BUN BLD-MCNC: 11 MG/DL (ref 7–18)
BUN/CREAT SERPL: 12.8 (ref 10–20)
CALCIUM BLD-MCNC: 8.9 MG/DL (ref 8.5–10.1)
CHLORIDE SERPL-SCNC: 109 MMOL/L (ref 98–112)
CO2 SERPL-SCNC: 25 MMOL/L (ref 21–32)
CREAT BLD-MCNC: 0.86 MG/DL (ref 0.55–1.02)
DEPRECATED RDW RBC AUTO: 44.4 FL (ref 35.1–46.3)
EOSINOPHIL # BLD AUTO: 0 X10(3) UL (ref 0–0.7)
EOSINOPHIL NFR BLD AUTO: 0 %
ERYTHROCYTE [DISTWIDTH] IN BLOOD BY AUTOMATED COUNT: 13.1 % (ref 11–15)
GLOBULIN PLAS-MCNC: 3.9 G/DL (ref 2.8–4.4)
GLUCOSE BLD-MCNC: 90 MG/DL (ref 70–99)
HCT VFR BLD AUTO: 43.2 % (ref 35–48)
HGB BLD-MCNC: 14 G/DL (ref 12–16)
IMM GRANULOCYTES # BLD AUTO: 0.02 X10(3) UL (ref 0–1)
IMM GRANULOCYTES NFR BLD: 0.3 %
LYMPHOCYTES # BLD AUTO: 2.05 X10(3) UL (ref 1–4)
LYMPHOCYTES NFR BLD AUTO: 31.1 %
M PROTEIN MFR SERPL ELPH: 7.3 G/DL (ref 6.4–8.2)
MCH RBC QN AUTO: 30.2 PG (ref 26–34)
MCHC RBC AUTO-ENTMCNC: 32.4 G/DL (ref 31–37)
MCV RBC AUTO: 93.1 FL (ref 80–100)
MONOCYTES # BLD AUTO: 0.5 X10(3) UL (ref 0.1–1)
MONOCYTES NFR BLD AUTO: 7.6 %
NEUTROPHILS # BLD AUTO: 4.02 X10 (3) UL (ref 1.5–7.7)
NEUTROPHILS # BLD AUTO: 4.02 X10(3) UL (ref 1.5–7.7)
NEUTROPHILS NFR BLD AUTO: 61 %
OSMOLALITY SERPL CALC.SUM OF ELEC: 289 MOSM/KG (ref 275–295)
PATIENT FASTING Y/N/NP: YES
PLATELET # BLD AUTO: 233 10(3)UL (ref 150–450)
POTASSIUM SERPL-SCNC: 4.2 MMOL/L (ref 3.5–5.1)
RBC # BLD AUTO: 4.64 X10(6)UL (ref 3.8–5.3)
SODIUM SERPL-SCNC: 140 MMOL/L (ref 136–145)
T3FREE SERPL-MCNC: 2.51 PG/ML (ref 2.4–4.2)
T4 FREE SERPL-MCNC: 1 NG/DL (ref 0.8–1.7)
TSI SER-ACNC: 7.3 MIU/ML (ref 0.36–3.74)
WBC # BLD AUTO: 6.6 X10(3) UL (ref 4–11)

## 2020-07-23 PROCEDURE — 80053 COMPREHEN METABOLIC PANEL: CPT

## 2020-07-23 PROCEDURE — 85025 COMPLETE CBC W/AUTO DIFF WBC: CPT

## 2020-07-23 PROCEDURE — 84439 ASSAY OF FREE THYROXINE: CPT

## 2020-07-23 PROCEDURE — 84443 ASSAY THYROID STIM HORMONE: CPT

## 2020-07-23 PROCEDURE — 83516 IMMUNOASSAY NONANTIBODY: CPT

## 2020-07-23 PROCEDURE — 36415 COLL VENOUS BLD VENIPUNCTURE: CPT

## 2020-07-23 PROCEDURE — 84481 FREE ASSAY (FT-3): CPT

## 2020-07-24 LAB — TTG IGG SER-ACNC: <0.6 U/ML (ref ?–7)

## 2020-12-14 ENCOUNTER — OFFICE VISIT (OUTPATIENT)
Dept: OBGYN CLINIC | Facility: CLINIC | Age: 40
End: 2020-12-14

## 2020-12-14 VITALS
SYSTOLIC BLOOD PRESSURE: 116 MMHG | BODY MASS INDEX: 41.28 KG/M2 | HEIGHT: 67 IN | WEIGHT: 263 LBS | DIASTOLIC BLOOD PRESSURE: 72 MMHG

## 2020-12-14 DIAGNOSIS — N92.4 EXCESSIVE BLEEDING IN PREMENOPAUSAL PERIOD: Primary | ICD-10-CM

## 2020-12-14 DIAGNOSIS — Z23 NEED FOR VACCINATION: ICD-10-CM

## 2020-12-14 PROCEDURE — 3078F DIAST BP <80 MM HG: CPT | Performed by: OBSTETRICS & GYNECOLOGY

## 2020-12-14 PROCEDURE — 90686 IIV4 VACC NO PRSV 0.5 ML IM: CPT | Performed by: OBSTETRICS & GYNECOLOGY

## 2020-12-14 PROCEDURE — 82728 ASSAY OF FERRITIN: CPT | Performed by: OBSTETRICS & GYNECOLOGY

## 2020-12-14 PROCEDURE — 84443 ASSAY THYROID STIM HORMONE: CPT | Performed by: OBSTETRICS & GYNECOLOGY

## 2020-12-14 PROCEDURE — 99213 OFFICE O/P EST LOW 20 MIN: CPT | Performed by: OBSTETRICS & GYNECOLOGY

## 2020-12-14 PROCEDURE — 3074F SYST BP LT 130 MM HG: CPT | Performed by: OBSTETRICS & GYNECOLOGY

## 2020-12-14 PROCEDURE — 84439 ASSAY OF FREE THYROXINE: CPT | Performed by: OBSTETRICS & GYNECOLOGY

## 2020-12-14 PROCEDURE — 3008F BODY MASS INDEX DOCD: CPT | Performed by: OBSTETRICS & GYNECOLOGY

## 2020-12-14 PROCEDURE — 85025 COMPLETE CBC W/AUTO DIFF WBC: CPT | Performed by: OBSTETRICS & GYNECOLOGY

## 2020-12-14 PROCEDURE — 90471 IMMUNIZATION ADMIN: CPT | Performed by: OBSTETRICS & GYNECOLOGY

## 2020-12-14 NOTE — PROGRESS NOTES
GYN H&P     2020  3:25 PM    CC: Patient is here for f/u of menses     HPI: patient is a 47year old  here for her f/u of menses    Patient has h/o endometriosis s/p laparoscopic resection prior to her 2 kids.   She was last seen for annual  •  busPIRone HCl 15 MG Oral Tab, Take 1 tablet (15 mg total) by mouth 2 (two) times daily.  Take 1/2 tab PO BID for 1 wk, then 1 tab PO BID, Disp: 180 tablet, Rfl: 1    •  Venlafaxine HCl  MG Oral Capsule SR 24 Hr, Take 2 capsules (300 mg total) by mo Constitutional: She appears well-developed and well-nourished. Abdominal:       Laparoscopy skin incision  Well healed pfannenstiel skin incision   Genitourinary:    Uterus normal.   No labial fusion.  There is no rash, tenderness, lesion or injury on the 2. Menorrhagia: I recommend US, CBC, thyroid testing today. We reviewed pending US results can make further plan. We reviewed option of switching OCP but she declines. She highly desires hysterectomy. We reviewed TLH, ovarian conservation.  We reviewed risk

## 2020-12-16 ENCOUNTER — ULTRASOUND ENCOUNTER (OUTPATIENT)
Dept: OBGYN CLINIC | Facility: CLINIC | Age: 40
End: 2020-12-16

## 2020-12-16 DIAGNOSIS — N92.4 EXCESSIVE BLEEDING IN PREMENOPAUSAL PERIOD: Primary | ICD-10-CM

## 2020-12-16 PROCEDURE — 76830 TRANSVAGINAL US NON-OB: CPT | Performed by: OBSTETRICS & GYNECOLOGY

## 2020-12-16 PROCEDURE — 76856 US EXAM PELVIC COMPLETE: CPT | Performed by: OBSTETRICS & GYNECOLOGY

## 2020-12-31 ENCOUNTER — VIRTUAL PHONE E/M (OUTPATIENT)
Dept: OBGYN CLINIC | Facility: CLINIC | Age: 40
End: 2020-12-31

## 2020-12-31 DIAGNOSIS — N92.4 EXCESSIVE BLEEDING IN PREMENOPAUSAL PERIOD: Primary | ICD-10-CM

## 2020-12-31 PROCEDURE — 99213 OFFICE O/P EST LOW 20 MIN: CPT | Performed by: OBSTETRICS & GYNECOLOGY

## 2020-12-31 NOTE — PROGRESS NOTES
Virtual Telephone Check-In    Samson Farrar verbally consents to a Virtual/Telephone Check-In visit on 12/31/20. Patient has been referred to the [facility] website at [email or web address] to review the yearly Consent to Treat document.     Patient und vessels. We reviewed in her case I recommend ovarian preservation. She voiced understanding. Sent message to surgical scheduling. Will randell preoperative clearance. All questions answered.     Yulissa Hobbs, 01/02/21, 7:32 AM

## 2021-01-07 ENCOUNTER — TELEPHONE (OUTPATIENT)
Dept: OBGYN CLINIC | Facility: CLINIC | Age: 41
End: 2021-01-07

## 2021-01-08 ENCOUNTER — TELEPHONE (OUTPATIENT)
Dept: OBGYN CLINIC | Facility: CLINIC | Age: 41
End: 2021-01-08

## 2021-01-08 DIAGNOSIS — N92.1 MENOMETRORRHAGIA: Primary | ICD-10-CM

## 2021-01-08 NOTE — TELEPHONE ENCOUNTER
Surgery scheduled for 3/2/2021 at [address] [place] [address] op   Future Appointments   Date Time Provider Sidney Nereida   3/15/2021 11:15 AM Bindu Kelsey MD [facility] [address]   7/14/2021  8:00 AM iBndu Kelsey MD [facility] [address]     Order

## 2021-01-08 NOTE — TELEPHONE ENCOUNTER
----- Message from Florence Cornejo MD sent at 12/31/2020 11:38 AM CST -----  Regarding: please schedule for surgery  Please schedule for surgery:    Date: as per schedule allows    Surgeon: Shiv Ibanez    Assistant: as per schedule    Type of admit: outpatient

## 2021-01-09 ENCOUNTER — PATIENT MESSAGE (OUTPATIENT)
Dept: FAMILY MEDICINE CLINIC | Facility: CLINIC | Age: 41
End: 2021-01-09

## 2021-01-11 NOTE — TELEPHONE ENCOUNTER
From: Michelle Degroot  To: Hernan Butts DO  Sent: 1/9/2021 1:55 PM CST  Subject: Non-Urgent Medical Question    Hi Dr. Rodriguez Earing. I am scheduled to have a hysterectomy on March 2 with Dr. Bakari Ackerman.  She asked for me to get clearance with my PC

## 2021-01-23 DIAGNOSIS — E03.8 HYPOTHYROIDISM DUE TO HASHIMOTO'S THYROIDITIS: Chronic | ICD-10-CM

## 2021-01-23 DIAGNOSIS — E06.3 HYPOTHYROIDISM DUE TO HASHIMOTO'S THYROIDITIS: Chronic | ICD-10-CM

## 2021-01-25 ENCOUNTER — TELEPHONE (OUTPATIENT)
Dept: OBGYN CLINIC | Facility: CLINIC | Age: 41
End: 2021-01-25

## 2021-01-25 RX ORDER — LEVOTHYROXINE SODIUM 0.2 MG/1
TABLET ORAL
Qty: 38 TABLET | Refills: 0 | Status: SHIPPED | OUTPATIENT
Start: 2021-01-25 | End: 2021-02-25

## 2021-01-25 NOTE — TELEPHONE ENCOUNTER
FMLA forms were sent over from [place] [id number]. The fee was paid and these forms were put in the Nurse mailbox in [place].

## 2021-02-04 ENCOUNTER — MED REC SCAN ONLY (OUTPATIENT)
Dept: OBGYN CLINIC | Facility: CLINIC | Age: 41
End: 2021-02-04

## 2021-02-04 NOTE — TELEPHONE ENCOUNTER
Paperwork completed and signed. Faxed to [phone] per patient's request.   Copy sent to scanning. Copy in Nina Small.

## 2021-02-15 ENCOUNTER — EKG ENCOUNTER (OUTPATIENT)
Dept: LAB | Age: 41
End: 2021-02-15
Attending: FAMILY MEDICINE
Payer: COMMERCIAL

## 2021-02-15 ENCOUNTER — OFFICE VISIT (OUTPATIENT)
Dept: FAMILY MEDICINE CLINIC | Facility: CLINIC | Age: 41
End: 2021-02-15

## 2021-02-15 ENCOUNTER — LAB ENCOUNTER (OUTPATIENT)
Dept: LAB | Age: 41
End: 2021-02-15
Attending: FAMILY MEDICINE
Payer: COMMERCIAL

## 2021-02-15 VITALS
HEART RATE: 88 BPM | DIASTOLIC BLOOD PRESSURE: 78 MMHG | TEMPERATURE: 98 F | WEIGHT: 250 LBS | BODY MASS INDEX: 39.24 KG/M2 | SYSTOLIC BLOOD PRESSURE: 128 MMHG | RESPIRATION RATE: 16 BRPM | HEIGHT: 67 IN | OXYGEN SATURATION: 99 %

## 2021-02-15 DIAGNOSIS — G43.109 MIGRAINE WITH AURA AND WITHOUT STATUS MIGRAINOSUS, NOT INTRACTABLE: ICD-10-CM

## 2021-02-15 DIAGNOSIS — N92.4 EXCESSIVE BLEEDING IN PREMENOPAUSAL PERIOD: ICD-10-CM

## 2021-02-15 DIAGNOSIS — E03.9 HYPOTHYROIDISM, UNSPECIFIED TYPE: ICD-10-CM

## 2021-02-15 DIAGNOSIS — I10 ESSENTIAL HYPERTENSION: ICD-10-CM

## 2021-02-15 DIAGNOSIS — Z01.818 PREOP EXAMINATION: ICD-10-CM

## 2021-02-15 DIAGNOSIS — Z01.818 PREOP EXAMINATION: Primary | ICD-10-CM

## 2021-02-15 LAB
ANION GAP SERPL CALC-SCNC: 3 MMOL/L (ref 0–18)
ATRIAL RATE: 83 BPM
BASOPHILS # BLD AUTO: 0.01 X10(3) UL (ref 0–0.2)
BASOPHILS NFR BLD AUTO: 0.1 %
BILIRUB UR QL STRIP.AUTO: NEGATIVE
BUN BLD-MCNC: 13 MG/DL (ref 7–18)
BUN/CREAT SERPL: 13.7 (ref 10–20)
CALCIUM BLD-MCNC: 9.6 MG/DL (ref 8.5–10.1)
CHLORIDE SERPL-SCNC: 108 MMOL/L (ref 98–112)
CO2 SERPL-SCNC: 26 MMOL/L (ref 21–32)
COLOR UR AUTO: YELLOW
CREAT BLD-MCNC: 0.95 MG/DL
DEPRECATED RDW RBC AUTO: 42.3 FL (ref 35.1–46.3)
EOSINOPHIL # BLD AUTO: 0.01 X10(3) UL (ref 0–0.7)
EOSINOPHIL NFR BLD AUTO: 0.1 %
ERYTHROCYTE [DISTWIDTH] IN BLOOD BY AUTOMATED COUNT: 12.7 % (ref 11–15)
GLUCOSE BLD-MCNC: 96 MG/DL (ref 70–99)
GLUCOSE UR STRIP.AUTO-MCNC: NEGATIVE MG/DL
HCT VFR BLD AUTO: 47.7 %
HGB BLD-MCNC: 15.9 G/DL
IMM GRANULOCYTES # BLD AUTO: 0.02 X10(3) UL (ref 0–1)
IMM GRANULOCYTES NFR BLD: 0.3 %
KETONES UR STRIP.AUTO-MCNC: NEGATIVE MG/DL
LYMPHOCYTES # BLD AUTO: 2.12 X10(3) UL (ref 1–4)
LYMPHOCYTES NFR BLD AUTO: 27.2 %
MCH RBC QN AUTO: 30.5 PG (ref 26–34)
MCHC RBC AUTO-ENTMCNC: 33.3 G/DL (ref 31–37)
MCV RBC AUTO: 91.6 FL
MONOCYTES # BLD AUTO: 0.71 X10(3) UL (ref 0.1–1)
MONOCYTES NFR BLD AUTO: 9.1 %
NEUTROPHILS # BLD AUTO: 4.93 X10 (3) UL (ref 1.5–7.7)
NEUTROPHILS # BLD AUTO: 4.93 X10(3) UL (ref 1.5–7.7)
NEUTROPHILS NFR BLD AUTO: 63.2 %
NITRITE UR QL STRIP.AUTO: NEGATIVE
OSMOLALITY SERPL CALC.SUM OF ELEC: 284 MOSM/KG (ref 275–295)
P AXIS: 59 DEGREES
P-R INTERVAL: 160 MS
PATIENT FASTING Y/N/NP: YES
PH UR STRIP.AUTO: 5 [PH] (ref 4.5–8)
PLATELET # BLD AUTO: 260 10(3)UL (ref 150–450)
POTASSIUM SERPL-SCNC: 5 MMOL/L (ref 3.5–5.1)
PROT UR STRIP.AUTO-MCNC: NEGATIVE MG/DL
Q-T INTERVAL: 378 MS
QRS DURATION: 88 MS
QTC CALCULATION (BEZET): 444 MS
R AXIS: 13 DEGREES
RBC # BLD AUTO: 5.21 X10(6)UL
RBC UR QL AUTO: NEGATIVE
SODIUM SERPL-SCNC: 137 MMOL/L (ref 136–145)
SP GR UR STRIP.AUTO: 1.02 (ref 1–1.03)
T AXIS: 48 DEGREES
UROBILINOGEN UR STRIP.AUTO-MCNC: <2 MG/DL
VENTRICULAR RATE: 83 BPM
WBC # BLD AUTO: 7.8 X10(3) UL (ref 4–11)

## 2021-02-15 PROCEDURE — 3008F BODY MASS INDEX DOCD: CPT | Performed by: FAMILY MEDICINE

## 2021-02-15 PROCEDURE — 81001 URINALYSIS AUTO W/SCOPE: CPT

## 2021-02-15 PROCEDURE — 3074F SYST BP LT 130 MM HG: CPT | Performed by: FAMILY MEDICINE

## 2021-02-15 PROCEDURE — 87086 URINE CULTURE/COLONY COUNT: CPT

## 2021-02-15 PROCEDURE — 93005 ELECTROCARDIOGRAM TRACING: CPT

## 2021-02-15 PROCEDURE — 99243 OFF/OP CNSLTJ NEW/EST LOW 30: CPT | Performed by: FAMILY MEDICINE

## 2021-02-15 PROCEDURE — 93010 ELECTROCARDIOGRAM REPORT: CPT | Performed by: INTERNAL MEDICINE

## 2021-02-15 PROCEDURE — 3078F DIAST BP <80 MM HG: CPT | Performed by: FAMILY MEDICINE

## 2021-02-15 PROCEDURE — 85025 COMPLETE CBC W/AUTO DIFF WBC: CPT

## 2021-02-15 PROCEDURE — 80048 BASIC METABOLIC PNL TOTAL CA: CPT

## 2021-02-15 PROCEDURE — 36415 COLL VENOUS BLD VENIPUNCTURE: CPT

## 2021-02-15 NOTE — PROGRESS NOTES
Sudha Aaron is a 47year old female who presents for preop clearance  Dr. Lyla Castro reqesting clearance  total laparoscopic hysterectomy, bilateral salpingectomy and cystoscopy  3/2/2021  At [facility]   HPI:   Pt complains of excessive menstruat Package 0   • METOPROLOL SUCCINATE  MG Oral Tablet 24 Hr TAKE 1 TABLET(100 MG) BY MOUTH DAILY 90 tablet 3   • Levothyroxine Sodium 175 MCG Oral Tab Take one tab x 4 days per week 60 tablet 2      Past Medical History:   Diagnosis Date   • Allergic rh Temp 98.1 °F (36.7 °C) (Temporal)   Resp 16   Ht 5' 7\" (1.702 m)   Wt 250 lb (113.4 kg)   LMP 02/01/2021   SpO2 99%   BMI 39.16 kg/m²   Body mass index is 39.16 kg/m².    GENERAL: well developed, well nourished,in no apparent distress  SKIN: no rashes,no s METABOLIC PANEL (8); Future  - EKG 12-LEAD; Future  - URINALYSIS, ROUTINE; Future  - URINE CULTURE, ROUTINE; Future      Pt is low risk for a low risk procedure. RTC 2-3 months or sooner if needed.

## 2021-02-23 ENCOUNTER — TELEPHONE (OUTPATIENT)
Dept: OBGYN CLINIC | Facility: CLINIC | Age: 41
End: 2021-02-23

## 2021-02-23 NOTE — TELEPHONE ENCOUNTER
Fax received from pre admission testing notifying of abnormal results on UA and culture ordered by PCP. Pt scheduled for TLH/BS on 3/2/21. Fax placed in Dr. Prosper Valdivia in [place].

## 2021-02-24 DIAGNOSIS — E06.3 HYPOTHYROIDISM DUE TO HASHIMOTO'S THYROIDITIS: Chronic | ICD-10-CM

## 2021-02-24 DIAGNOSIS — E03.8 HYPOTHYROIDISM DUE TO HASHIMOTO'S THYROIDITIS: Chronic | ICD-10-CM

## 2021-02-24 DIAGNOSIS — G43.109 MIGRAINE WITH AURA AND WITHOUT STATUS MIGRAINOSUS, NOT INTRACTABLE: ICD-10-CM

## 2021-02-24 DIAGNOSIS — I10 ESSENTIAL HYPERTENSION: ICD-10-CM

## 2021-02-25 RX ORDER — METOPROLOL SUCCINATE 100 MG/1
100 TABLET, EXTENDED RELEASE ORAL DAILY
Qty: 90 TABLET | Refills: 1 | Status: SHIPPED | OUTPATIENT
Start: 2021-02-25 | End: 2021-09-02

## 2021-02-25 RX ORDER — LEVOTHYROXINE SODIUM 0.2 MG/1
TABLET ORAL
Qty: 38 TABLET | Refills: 0 | Status: SHIPPED | OUTPATIENT
Start: 2021-02-25 | End: 2021-03-15

## 2021-02-27 ENCOUNTER — LAB ENCOUNTER (OUTPATIENT)
Dept: LAB | Age: 41
End: 2021-02-27
Attending: OBSTETRICS & GYNECOLOGY
Payer: COMMERCIAL

## 2021-02-27 DIAGNOSIS — N92.4 EXCESSIVE BLEEDING IN PREMENOPAUSAL PERIOD: ICD-10-CM

## 2021-02-28 LAB — SARS-COV-2 RNA RESP QL NAA+PROBE: NOT DETECTED

## 2021-03-01 ENCOUNTER — ANESTHESIA EVENT (OUTPATIENT)
Dept: SURGERY | Facility: HOSPITAL | Age: 41
End: 2021-03-01
Payer: COMMERCIAL

## 2021-03-02 ENCOUNTER — HOSPITAL ENCOUNTER (OUTPATIENT)
Facility: HOSPITAL | Age: 41
Discharge: HOME | End: 2021-03-03
Attending: OBSTETRICS & GYNECOLOGY | Admitting: OBSTETRICS & GYNECOLOGY
Payer: COMMERCIAL

## 2021-03-02 ENCOUNTER — ANESTHESIA (OUTPATIENT)
Dept: SURGERY | Facility: HOSPITAL | Age: 41
End: 2021-03-02
Payer: COMMERCIAL

## 2021-03-02 DIAGNOSIS — N92.4 EXCESSIVE BLEEDING IN PREMENOPAUSAL PERIOD: Primary | ICD-10-CM

## 2021-03-02 DIAGNOSIS — N92.1 MENOMETRORRHAGIA: ICD-10-CM

## 2021-03-02 PROBLEM — Z90.710 STATUS POST HYSTERECTOMY: Status: ACTIVE | Noted: 2021-03-02

## 2021-03-02 LAB
EXPIRATION DATE: NORMAL
POCT LOT NUMBER: NORMAL
POCT URINE PREGNANCY: NEGATIVE

## 2021-03-02 PROCEDURE — 0UT7FZZ RESECTION OF BILATERAL FALLOPIAN TUBES, VIA NATURAL OR ARTIFICIAL OPENING WITH PERCUTANEOUS ENDOSCOPIC ASSISTANCE: ICD-10-PCS | Performed by: OBSTETRICS & GYNECOLOGY

## 2021-03-02 PROCEDURE — 58571 TLH W/T/O 250 G OR LESS: CPT | Performed by: OBSTETRICS & GYNECOLOGY

## 2021-03-02 PROCEDURE — 0UT9FZZ RESECTION OF UTERUS, VIA NATURAL OR ARTIFICIAL OPENING WITH PERCUTANEOUS ENDOSCOPIC ASSISTANCE: ICD-10-PCS | Performed by: OBSTETRICS & GYNECOLOGY

## 2021-03-02 RX ORDER — NALOXONE HYDROCHLORIDE 0.4 MG/ML
80 INJECTION, SOLUTION INTRAMUSCULAR; INTRAVENOUS; SUBCUTANEOUS AS NEEDED
Status: DISCONTINUED | OUTPATIENT
Start: 2021-03-02 | End: 2021-03-02 | Stop reason: HOSPADM

## 2021-03-02 RX ORDER — IBUPROFEN 400 MG/1
400 TABLET ORAL EVERY 6 HOURS PRN
Status: DISCONTINUED | OUTPATIENT
Start: 2021-03-02 | End: 2021-03-03

## 2021-03-02 RX ORDER — ACETAMINOPHEN 500 MG
1000 TABLET ORAL ONCE
Status: DISCONTINUED | OUTPATIENT
Start: 2021-03-02 | End: 2021-03-03

## 2021-03-02 RX ORDER — ROCURONIUM BROMIDE 10 MG/ML
INJECTION, SOLUTION INTRAVENOUS AS NEEDED
Status: DISCONTINUED | OUTPATIENT
Start: 2021-03-02 | End: 2021-03-02 | Stop reason: SURG

## 2021-03-02 RX ORDER — LIDOCAINE HYDROCHLORIDE 10 MG/ML
INJECTION, SOLUTION EPIDURAL; INFILTRATION; INTRACAUDAL; PERINEURAL AS NEEDED
Status: DISCONTINUED | OUTPATIENT
Start: 2021-03-02 | End: 2021-03-02 | Stop reason: SURG

## 2021-03-02 RX ORDER — HYDRALAZINE HYDROCHLORIDE 20 MG/ML
INJECTION INTRAMUSCULAR; INTRAVENOUS AS NEEDED
Status: DISCONTINUED | OUTPATIENT
Start: 2021-03-02 | End: 2021-03-02 | Stop reason: SURG

## 2021-03-02 RX ORDER — METOCLOPRAMIDE HYDROCHLORIDE 5 MG/ML
10 INJECTION INTRAMUSCULAR; INTRAVENOUS AS NEEDED
Status: DISCONTINUED | OUTPATIENT
Start: 2021-03-02 | End: 2021-03-02 | Stop reason: HOSPADM

## 2021-03-02 RX ORDER — SIMETHICONE 80 MG
80 TABLET,CHEWABLE ORAL 3 TIMES DAILY
Status: DISCONTINUED | OUTPATIENT
Start: 2021-03-02 | End: 2021-03-02

## 2021-03-02 RX ORDER — HYDROMORPHONE HYDROCHLORIDE 1 MG/ML
0.8 INJECTION, SOLUTION INTRAMUSCULAR; INTRAVENOUS; SUBCUTANEOUS EVERY 2 HOUR PRN
Status: DISCONTINUED | OUTPATIENT
Start: 2021-03-02 | End: 2021-03-03

## 2021-03-02 RX ORDER — DOCUSATE SODIUM 100 MG/1
100 CAPSULE, LIQUID FILLED ORAL 2 TIMES DAILY
Status: DISCONTINUED | OUTPATIENT
Start: 2021-03-02 | End: 2021-03-03

## 2021-03-02 RX ORDER — METOPROLOL SUCCINATE 100 MG/1
100 TABLET, EXTENDED RELEASE ORAL
Status: DISCONTINUED | OUTPATIENT
Start: 2021-03-03 | End: 2021-03-03

## 2021-03-02 RX ORDER — ONDANSETRON 2 MG/ML
4 INJECTION INTRAMUSCULAR; INTRAVENOUS AS NEEDED
Status: DISCONTINUED | OUTPATIENT
Start: 2021-03-02 | End: 2021-03-02 | Stop reason: HOSPADM

## 2021-03-02 RX ORDER — KETOROLAC TROMETHAMINE 30 MG/ML
30 INJECTION, SOLUTION INTRAMUSCULAR; INTRAVENOUS EVERY 6 HOURS PRN
Status: DISCONTINUED | OUTPATIENT
Start: 2021-03-02 | End: 2021-03-03

## 2021-03-02 RX ORDER — IBUPROFEN 600 MG/1
600 TABLET ORAL EVERY 6 HOURS PRN
Status: DISCONTINUED | OUTPATIENT
Start: 2021-03-02 | End: 2021-03-03

## 2021-03-02 RX ORDER — HYDROMORPHONE HYDROCHLORIDE 1 MG/ML
0.4 INJECTION, SOLUTION INTRAMUSCULAR; INTRAVENOUS; SUBCUTANEOUS EVERY 2 HOUR PRN
Status: DISCONTINUED | OUTPATIENT
Start: 2021-03-02 | End: 2021-03-03

## 2021-03-02 RX ORDER — SIMETHICONE 80 MG
160 TABLET,CHEWABLE ORAL 3 TIMES DAILY
Status: DISCONTINUED | OUTPATIENT
Start: 2021-03-02 | End: 2021-03-02

## 2021-03-02 RX ORDER — HYDROCODONE BITARTRATE AND ACETAMINOPHEN 5; 325 MG/1; MG/1
1 TABLET ORAL AS NEEDED
Status: DISCONTINUED | OUTPATIENT
Start: 2021-03-02 | End: 2021-03-02 | Stop reason: HOSPADM

## 2021-03-02 RX ORDER — GLYCOPYRROLATE 0.2 MG/ML
INJECTION, SOLUTION INTRAMUSCULAR; INTRAVENOUS AS NEEDED
Status: DISCONTINUED | OUTPATIENT
Start: 2021-03-02 | End: 2021-03-02 | Stop reason: SURG

## 2021-03-02 RX ORDER — ONDANSETRON 2 MG/ML
INJECTION INTRAMUSCULAR; INTRAVENOUS AS NEEDED
Status: DISCONTINUED | OUTPATIENT
Start: 2021-03-02 | End: 2021-03-02 | Stop reason: SURG

## 2021-03-02 RX ORDER — MIDAZOLAM HYDROCHLORIDE 1 MG/ML
INJECTION INTRAMUSCULAR; INTRAVENOUS AS NEEDED
Status: DISCONTINUED | OUTPATIENT
Start: 2021-03-02 | End: 2021-03-02 | Stop reason: SURG

## 2021-03-02 RX ORDER — SCOLOPAMINE TRANSDERMAL SYSTEM 1 MG/1
1 PATCH, EXTENDED RELEASE TRANSDERMAL
Status: DISCONTINUED | OUTPATIENT
Start: 2021-03-02 | End: 2021-03-03

## 2021-03-02 RX ORDER — BUPIVACAINE HYDROCHLORIDE 2.5 MG/ML
INJECTION, SOLUTION EPIDURAL; INFILTRATION; INTRACAUDAL AS NEEDED
Status: DISCONTINUED | OUTPATIENT
Start: 2021-03-02 | End: 2021-03-02 | Stop reason: HOSPADM

## 2021-03-02 RX ORDER — HYDROCODONE BITARTRATE AND ACETAMINOPHEN 5; 325 MG/1; MG/1
2 TABLET ORAL AS NEEDED
Status: DISCONTINUED | OUTPATIENT
Start: 2021-03-02 | End: 2021-03-02 | Stop reason: HOSPADM

## 2021-03-02 RX ORDER — ALPRAZOLAM 0.25 MG/1
0.25 TABLET ORAL 3 TIMES DAILY PRN
Status: DISCONTINUED | OUTPATIENT
Start: 2021-03-02 | End: 2021-03-03

## 2021-03-02 RX ORDER — SODIUM CHLORIDE, SODIUM LACTATE, POTASSIUM CHLORIDE, CALCIUM CHLORIDE 600; 310; 30; 20 MG/100ML; MG/100ML; MG/100ML; MG/100ML
INJECTION, SOLUTION INTRAVENOUS CONTINUOUS
Status: DISCONTINUED | OUTPATIENT
Start: 2021-03-02 | End: 2021-03-03

## 2021-03-02 RX ORDER — BUSPIRONE HYDROCHLORIDE 15 MG/1
15 TABLET ORAL 2 TIMES DAILY
Status: DISCONTINUED | OUTPATIENT
Start: 2021-03-02 | End: 2021-03-03

## 2021-03-02 RX ORDER — HYDROMORPHONE HYDROCHLORIDE 1 MG/ML
0.2 INJECTION, SOLUTION INTRAMUSCULAR; INTRAVENOUS; SUBCUTANEOUS EVERY 2 HOUR PRN
Status: DISCONTINUED | OUTPATIENT
Start: 2021-03-02 | End: 2021-03-03

## 2021-03-02 RX ORDER — DEXAMETHASONE SODIUM PHOSPHATE 4 MG/ML
VIAL (ML) INJECTION AS NEEDED
Status: DISCONTINUED | OUTPATIENT
Start: 2021-03-02 | End: 2021-03-02 | Stop reason: SURG

## 2021-03-02 RX ORDER — LEVOTHYROXINE SODIUM 0.2 MG/1
200 TABLET ORAL
Status: DISCONTINUED | OUTPATIENT
Start: 2021-03-03 | End: 2021-03-03

## 2021-03-02 RX ORDER — EPHEDRINE SULFATE 50 MG/ML
INJECTION INTRAVENOUS AS NEEDED
Status: DISCONTINUED | OUTPATIENT
Start: 2021-03-02 | End: 2021-03-02 | Stop reason: SURG

## 2021-03-02 RX ORDER — HYDROCODONE BITARTRATE AND ACETAMINOPHEN 5; 325 MG/1; MG/1
1 TABLET ORAL EVERY 6 HOURS PRN
Status: DISCONTINUED | OUTPATIENT
Start: 2021-03-02 | End: 2021-03-03

## 2021-03-02 RX ORDER — NEOSTIGMINE METHYLSULFATE 1 MG/ML
INJECTION INTRAVENOUS AS NEEDED
Status: DISCONTINUED | OUTPATIENT
Start: 2021-03-02 | End: 2021-03-02 | Stop reason: SURG

## 2021-03-02 RX ORDER — IBUPROFEN 600 MG/1
600 TABLET ORAL ONCE AS NEEDED
Status: DISCONTINUED | OUTPATIENT
Start: 2021-03-02 | End: 2021-03-02 | Stop reason: HOSPADM

## 2021-03-02 RX ORDER — SIMETHICONE 80 MG
80 TABLET,CHEWABLE ORAL 3 TIMES DAILY PRN
Status: DISCONTINUED | OUTPATIENT
Start: 2021-03-02 | End: 2021-03-03

## 2021-03-02 RX ORDER — CEFAZOLIN SODIUM/WATER 2 G/20 ML
2 SYRINGE (ML) INTRAVENOUS ONCE
Status: COMPLETED | OUTPATIENT
Start: 2021-03-02 | End: 2021-03-02

## 2021-03-02 RX ORDER — SODIUM CHLORIDE, SODIUM LACTATE, POTASSIUM CHLORIDE, CALCIUM CHLORIDE 600; 310; 30; 20 MG/100ML; MG/100ML; MG/100ML; MG/100ML
INJECTION, SOLUTION INTRAVENOUS CONTINUOUS
Status: DISCONTINUED | OUTPATIENT
Start: 2021-03-02 | End: 2021-03-02 | Stop reason: HOSPADM

## 2021-03-02 RX ORDER — HYDROMORPHONE HYDROCHLORIDE 1 MG/ML
0.4 INJECTION, SOLUTION INTRAMUSCULAR; INTRAVENOUS; SUBCUTANEOUS EVERY 5 MIN PRN
Status: DISCONTINUED | OUTPATIENT
Start: 2021-03-02 | End: 2021-03-02 | Stop reason: HOSPADM

## 2021-03-02 RX ORDER — SIMETHICONE 80 MG
160 TABLET,CHEWABLE ORAL 3 TIMES DAILY PRN
Status: DISCONTINUED | OUTPATIENT
Start: 2021-03-02 | End: 2021-03-03

## 2021-03-02 RX ORDER — VENLAFAXINE HYDROCHLORIDE 75 MG/1
300 CAPSULE, EXTENDED RELEASE ORAL 2 TIMES DAILY
Status: DISCONTINUED | OUTPATIENT
Start: 2021-03-02 | End: 2021-03-03

## 2021-03-02 RX ADMIN — GLYCOPYRROLATE 0.4 MG: 0.2 INJECTION, SOLUTION INTRAMUSCULAR; INTRAVENOUS at 09:57:00

## 2021-03-02 RX ADMIN — HYDRALAZINE HYDROCHLORIDE 10 MG: 20 INJECTION INTRAMUSCULAR; INTRAVENOUS at 07:42:00

## 2021-03-02 RX ADMIN — ROCURONIUM BROMIDE 20 MG: 10 INJECTION, SOLUTION INTRAVENOUS at 07:42:00

## 2021-03-02 RX ADMIN — SODIUM CHLORIDE, SODIUM LACTATE, POTASSIUM CHLORIDE, CALCIUM CHLORIDE: 600; 310; 30; 20 INJECTION, SOLUTION INTRAVENOUS at 10:17:00

## 2021-03-02 RX ADMIN — CEFAZOLIN SODIUM/WATER 2 G: 2 G/20 ML SYRINGE (ML) INTRAVENOUS at 07:45:00

## 2021-03-02 RX ADMIN — ROCURONIUM BROMIDE 20 MG: 10 INJECTION, SOLUTION INTRAVENOUS at 07:37:00

## 2021-03-02 RX ADMIN — MIDAZOLAM HYDROCHLORIDE 2 MG: 1 INJECTION INTRAMUSCULAR; INTRAVENOUS at 07:31:00

## 2021-03-02 RX ADMIN — EPHEDRINE SULFATE 10 MG: 50 INJECTION INTRAVENOUS at 08:15:00

## 2021-03-02 RX ADMIN — LIDOCAINE HYDROCHLORIDE 50 MG: 10 INJECTION, SOLUTION EPIDURAL; INFILTRATION; INTRACAUDAL; PERINEURAL at 07:37:00

## 2021-03-02 RX ADMIN — ROCURONIUM BROMIDE 20 MG: 10 INJECTION, SOLUTION INTRAVENOUS at 09:08:00

## 2021-03-02 RX ADMIN — NEOSTIGMINE METHYLSULFATE 4 MG: 1 INJECTION INTRAVENOUS at 09:57:00

## 2021-03-02 RX ADMIN — ONDANSETRON 4 MG: 2 INJECTION INTRAMUSCULAR; INTRAVENOUS at 07:44:00

## 2021-03-02 RX ADMIN — DEXAMETHASONE SODIUM PHOSPHATE 8 MG: 4 MG/ML VIAL (ML) INJECTION at 07:44:00

## 2021-03-02 NOTE — ANESTHESIA PREPROCEDURE EVALUATION
PRE-OP EVALUATION    Patient Name: Luis Ramirez    Pre-op Diagnosis: Menometrorrhagia [N92.1]    Procedure(s):  total laparoscopic hysterectomy, bilateral salpingectomy and cystoscopy    Surgeon(s) and Role:     Benito Hunter MD - Primary    P 02/15/2021    CA 9.6 02/15/2021            Airway      Mallampati: II       Cardiovascular    Cardiovascular exam normal.         Dental    No notable dental history.          Pulmonary    Pulmonary exam normal.                 Other findings            ASA

## 2021-03-02 NOTE — ANESTHESIA PROCEDURE NOTES
Airway  Urgency: elective    Airway not difficult    General Information and Staff    Patient location during procedure: OR  Anesthesiologist: Krish Dunaway MD  Performed: anesthesiologist     Indications and Patient Condition  Indications for airway manag

## 2021-03-02 NOTE — ANESTHESIA POSTPROCEDURE EVALUATION
[address] [address] Patient Status:  Outpatient in a Bed   Age/Gender 47year old female MRN [id number]   [facility] SURGERY Attending Froilan Gómez MD   Hosp Day # 0 PCP Deandre Gregory DO       Anesthesia Post-op Note

## 2021-03-02 NOTE — PROGRESS NOTES
NURSING ADMISSION NOTE    Patient admitted via bed. Oriented to room. Safety precautions initiated. Bed in low position. Call light in reach.

## 2021-03-02 NOTE — H&P
GYN H&P     3/1/2021  9:07 PM    CC: Patient is here for hysterectomy    HPI: patient is a 47year old  here for her hysterectomy  Patient has struggled with menorrhagia and dysmenorrhea and has desired definitive surgical management.  She has CHTN o tablet, Rfl: 1    •  Norethin-Eth Estrad-Fe Biphas (LO LOESTRIN FE) 1 MG-10 MCG / 10 MCG Oral Tab, Take 1 tablet by mouth daily. , Disp: 84 tablet, Rfl: 3      Family History   Problem Relation Age of Onset   • Hypertension Father    • Depression Father questions answered. She desires to stay overnight tonight after surgery.  All questions answered    Jersey Emmanuel MD

## 2021-03-02 NOTE — OPERATIVE REPORT
Patient Name: Rosio Diez    Preoperative Diagnosis:  1. Menorrhagia with regular cycle   2. Pelvic pain    Postoperative Diagnosis: same    Primary Surgeon: Richardson Newberry    Assistant: Marlon Fothergill, MD    Procedures:   1.  Total laparoscopic hy under direct visualization, two more 5 mm ports were placed, one on the patient's right and the other the left side. The patient was then placed into [place].   Attention was first turned to her right fallopian tube, which using the ligasure, was right vaginal wall/hymenal abrasion was repaired using 2-0 vicryl (due to need for retraction for visualization of the cuff). Attention was then returned to the abdomen. The pelvis was irrigated.  The right side of the vaginal cuff was oozing slight

## 2021-03-03 VITALS
HEART RATE: 95 BPM | RESPIRATION RATE: 18 BRPM | SYSTOLIC BLOOD PRESSURE: 137 MMHG | TEMPERATURE: 99 F | DIASTOLIC BLOOD PRESSURE: 95 MMHG | BODY MASS INDEX: 39.1 KG/M2 | OXYGEN SATURATION: 98 % | HEIGHT: 67 IN | WEIGHT: 249.13 LBS

## 2021-03-03 PROBLEM — N92.4 EXCESSIVE BLEEDING IN PREMENOPAUSAL PERIOD: Status: RESOLVED | Noted: 2020-12-14 | Resolved: 2021-03-03

## 2021-03-03 LAB
BASOPHILS # BLD AUTO: 0.01 X10(3) UL (ref 0–0.2)
BASOPHILS NFR BLD AUTO: 0.1 %
DEPRECATED RDW RBC AUTO: 42.3 FL (ref 35.1–46.3)
EOSINOPHIL # BLD AUTO: 0 X10(3) UL (ref 0–0.7)
EOSINOPHIL NFR BLD AUTO: 0 %
ERYTHROCYTE [DISTWIDTH] IN BLOOD BY AUTOMATED COUNT: 12.8 % (ref 11–15)
HCT VFR BLD AUTO: 40 %
HGB BLD-MCNC: 13.3 G/DL
IMM GRANULOCYTES # BLD AUTO: 0.03 X10(3) UL (ref 0–1)
IMM GRANULOCYTES NFR BLD: 0.3 %
LYMPHOCYTES # BLD AUTO: 1.3 X10(3) UL (ref 1–4)
LYMPHOCYTES NFR BLD AUTO: 13.1 %
MCH RBC QN AUTO: 30.1 PG (ref 26–34)
MCHC RBC AUTO-ENTMCNC: 33.3 G/DL (ref 31–37)
MCV RBC AUTO: 90.5 FL
MONOCYTES # BLD AUTO: 0.8 X10(3) UL (ref 0.1–1)
MONOCYTES NFR BLD AUTO: 8.1 %
NEUTROPHILS # BLD AUTO: 7.76 X10 (3) UL (ref 1.5–7.7)
NEUTROPHILS # BLD AUTO: 7.76 X10(3) UL (ref 1.5–7.7)
NEUTROPHILS NFR BLD AUTO: 78.4 %
PLATELET # BLD AUTO: 241 10(3)UL (ref 150–450)
RBC # BLD AUTO: 4.42 X10(6)UL
WBC # BLD AUTO: 9.9 X10(3) UL (ref 4–11)

## 2021-03-03 RX ORDER — PSEUDOEPHEDRINE HCL 30 MG
100 TABLET ORAL 2 TIMES DAILY
Qty: 60 CAPSULE | Refills: 0 | Status: SHIPPED | OUTPATIENT
Start: 2021-03-03 | End: 2021-03-15

## 2021-03-03 RX ORDER — ACETAMINOPHEN 500 MG
1000 TABLET ORAL EVERY 6 HOURS PRN
Qty: 30 TABLET | Refills: 0 | Status: SHIPPED | OUTPATIENT
Start: 2021-03-03 | End: 2021-04-05

## 2021-03-03 RX ORDER — GABAPENTIN 100 MG/1
100 CAPSULE ORAL 3 TIMES DAILY
Status: DISCONTINUED | OUTPATIENT
Start: 2021-03-03 | End: 2021-03-03

## 2021-03-03 RX ORDER — IBUPROFEN 600 MG/1
600 TABLET ORAL EVERY 6 HOURS PRN
Qty: 60 TABLET | Refills: 0 | Status: SHIPPED | OUTPATIENT
Start: 2021-03-03 | End: 2021-04-05

## 2021-03-03 RX ORDER — ACETAMINOPHEN 500 MG
1000 TABLET ORAL EVERY 6 HOURS PRN
Status: DISCONTINUED | OUTPATIENT
Start: 2021-03-03 | End: 2021-03-03

## 2021-03-03 RX ORDER — HYDROCODONE BITARTRATE AND ACETAMINOPHEN 5; 325 MG/1; MG/1
1 TABLET ORAL EVERY 6 HOURS PRN
Qty: 20 TABLET | Refills: 0 | Status: SHIPPED | OUTPATIENT
Start: 2021-03-03 | End: 2021-03-15

## 2021-03-03 RX ORDER — GABAPENTIN 100 MG/1
100 CAPSULE ORAL 3 TIMES DAILY
Qty: 18 CAPSULE | Refills: 0 | Status: SHIPPED | OUTPATIENT
Start: 2021-03-03 | End: 2021-03-15

## 2021-03-03 RX ORDER — ACETAMINOPHEN 500 MG
500 TABLET ORAL EVERY 6 HOURS PRN
Status: DISCONTINUED | OUTPATIENT
Start: 2021-03-03 | End: 2021-03-03

## 2021-03-03 NOTE — PLAN OF CARE
Problem: SAFETY ADULT - FALL  Goal: Free from fall injury  Description: INTERVENTIONS:  - Assess pt frequently for physical needs  - Identify cognitive and physical deficits and behaviors that affect risk of falls.   - [place] fall precautions as indica

## 2021-03-03 NOTE — PROGRESS NOTES
GYN POD#1:    S:Feeling much better this AM. Headache still slightly present but improved. No incisional pain. No vaginal bleeding.  Urinating freely    O:  138/99  146/90  138/96  92  96% on RA    Gen: pt awake, alert, oriented   Abdomen: soft, nontender,

## 2021-03-03 NOTE — PLAN OF CARE
Problem: SAFETY ADULT - FALL  Goal: Free from fall injury  Description: INTERVENTIONS:  - Assess pt frequently for physical needs  - Identify cognitive and physical deficits and behaviors that affect risk of falls.   - [place] fall precautions as indica

## 2021-03-03 NOTE — PROGRESS NOTES
Pt c/o headache 7/10 on pain scale. Pt reports she often gets headaches, sometimes migraines. BP checked. Tylenol 1 gram given. Pt asking for regular Coke with caffeine. Dietary notified.

## 2021-03-09 DIAGNOSIS — E03.8 HYPOTHYROIDISM DUE TO HASHIMOTO'S THYROIDITIS: Chronic | ICD-10-CM

## 2021-03-09 DIAGNOSIS — E06.3 HYPOTHYROIDISM DUE TO HASHIMOTO'S THYROIDITIS: Chronic | ICD-10-CM

## 2021-03-12 ENCOUNTER — TELEPHONE (OUTPATIENT)
Dept: OBGYN CLINIC | Facility: CLINIC | Age: 41
End: 2021-03-12

## 2021-03-12 ENCOUNTER — PATIENT MESSAGE (OUTPATIENT)
Dept: FAMILY MEDICINE CLINIC | Facility: CLINIC | Age: 41
End: 2021-03-12

## 2021-03-12 DIAGNOSIS — E06.3 HYPOTHYROIDISM DUE TO HASHIMOTO'S THYROIDITIS: Chronic | ICD-10-CM

## 2021-03-12 DIAGNOSIS — E03.8 HYPOTHYROIDISM DUE TO HASHIMOTO'S THYROIDITIS: Chronic | ICD-10-CM

## 2021-03-12 NOTE — TELEPHONE ENCOUNTER
S/p TLH; BS on 3/2/21. Post op appt 3/15/21. Pt reports bleeding starting at 0200; having small gushes intermittently since then. Pt reports bright red blood, soaking pad q 4 hours. Denies clots.   Pt reports worsening cramping starting on Monday, [place]

## 2021-03-15 ENCOUNTER — OFFICE VISIT (OUTPATIENT)
Dept: OBGYN CLINIC | Facility: CLINIC | Age: 41
End: 2021-03-15

## 2021-03-15 VITALS
HEART RATE: 91 BPM | DIASTOLIC BLOOD PRESSURE: 86 MMHG | BODY MASS INDEX: 38.58 KG/M2 | SYSTOLIC BLOOD PRESSURE: 134 MMHG | HEIGHT: 67 IN | WEIGHT: 245.81 LBS

## 2021-03-15 DIAGNOSIS — Z90.710 STATUS POST HYSTERECTOMY: Primary | ICD-10-CM

## 2021-03-15 LAB
ALBUMIN SERPL-MCNC: 3.4 G/DL (ref 3.4–5)
ALBUMIN/GLOB SERPL: 0.7 {RATIO} (ref 1–2)
ALP LIVER SERPL-CCNC: 119 U/L
ALT SERPL-CCNC: 34 U/L
ANION GAP SERPL CALC-SCNC: 9 MMOL/L (ref 0–18)
AST SERPL-CCNC: 20 U/L (ref 15–37)
BASOPHILS # BLD AUTO: 0.02 X10(3) UL (ref 0–0.2)
BASOPHILS NFR BLD AUTO: 0.2 %
BILIRUB SERPL-MCNC: 0.1 MG/DL (ref 0.1–2)
BUN BLD-MCNC: 13 MG/DL (ref 7–18)
BUN/CREAT SERPL: 14 (ref 10–20)
CALCIUM BLD-MCNC: 10.4 MG/DL (ref 8.5–10.1)
CHLORIDE SERPL-SCNC: 102 MMOL/L (ref 98–112)
CO2 SERPL-SCNC: 25 MMOL/L (ref 21–32)
CREAT BLD-MCNC: 0.93 MG/DL
DEPRECATED RDW RBC AUTO: 44.3 FL (ref 35.1–46.3)
EOSINOPHIL # BLD AUTO: 0 X10(3) UL (ref 0–0.7)
EOSINOPHIL NFR BLD AUTO: 0 %
ERYTHROCYTE [DISTWIDTH] IN BLOOD BY AUTOMATED COUNT: 12.9 % (ref 11–15)
GLOBULIN PLAS-MCNC: 4.9 G/DL (ref 2.8–4.4)
GLUCOSE BLD-MCNC: 76 MG/DL (ref 70–99)
HCT VFR BLD AUTO: 42.6 %
HGB BLD-MCNC: 13.3 G/DL
IMM GRANULOCYTES # BLD AUTO: 0.18 X10(3) UL (ref 0–1)
IMM GRANULOCYTES NFR BLD: 1.5 %
LYMPHOCYTES # BLD AUTO: 2.51 X10(3) UL (ref 1–4)
LYMPHOCYTES NFR BLD AUTO: 21.1 %
M PROTEIN MFR SERPL ELPH: 8.3 G/DL (ref 6.4–8.2)
MCH RBC QN AUTO: 29.4 PG (ref 26–34)
MCHC RBC AUTO-ENTMCNC: 31.2 G/DL (ref 31–37)
MCV RBC AUTO: 94 FL
MONOCYTES # BLD AUTO: 0.94 X10(3) UL (ref 0.1–1)
MONOCYTES NFR BLD AUTO: 7.9 %
NEUTROPHILS # BLD AUTO: 8.24 X10 (3) UL (ref 1.5–7.7)
NEUTROPHILS # BLD AUTO: 8.24 X10(3) UL (ref 1.5–7.7)
NEUTROPHILS NFR BLD AUTO: 69.3 %
OSMOLALITY SERPL CALC.SUM OF ELEC: 281 MOSM/KG (ref 275–295)
PATIENT FASTING Y/N/NP: NO
PLATELET # BLD AUTO: 382 10(3)UL (ref 150–450)
POTASSIUM SERPL-SCNC: 4.4 MMOL/L (ref 3.5–5.1)
RBC # BLD AUTO: 4.53 X10(6)UL
SODIUM SERPL-SCNC: 136 MMOL/L (ref 136–145)
WBC # BLD AUTO: 11.9 X10(3) UL (ref 4–11)

## 2021-03-15 PROCEDURE — 85025 COMPLETE CBC W/AUTO DIFF WBC: CPT | Performed by: OBSTETRICS & GYNECOLOGY

## 2021-03-15 PROCEDURE — 3008F BODY MASS INDEX DOCD: CPT | Performed by: OBSTETRICS & GYNECOLOGY

## 2021-03-15 PROCEDURE — 3075F SYST BP GE 130 - 139MM HG: CPT | Performed by: OBSTETRICS & GYNECOLOGY

## 2021-03-15 PROCEDURE — 99024 POSTOP FOLLOW-UP VISIT: CPT | Performed by: OBSTETRICS & GYNECOLOGY

## 2021-03-15 PROCEDURE — 3079F DIAST BP 80-89 MM HG: CPT | Performed by: OBSTETRICS & GYNECOLOGY

## 2021-03-15 PROCEDURE — 80053 COMPREHEN METABOLIC PANEL: CPT | Performed by: OBSTETRICS & GYNECOLOGY

## 2021-03-15 RX ORDER — LEVOTHYROXINE SODIUM 0.2 MG/1
TABLET ORAL
Qty: 38 TABLET | Refills: 0 | Status: SHIPPED | OUTPATIENT
Start: 2021-03-15 | End: 2022-01-12

## 2021-03-15 RX ORDER — DOXYCYCLINE HYCLATE 100 MG
100 TABLET ORAL 2 TIMES DAILY
Qty: 15 TABLET | Refills: 0 | Status: ON HOLD | OUTPATIENT
Start: 2021-03-15 | End: 2021-03-22

## 2021-03-15 NOTE — TELEPHONE ENCOUNTER
Last TSH : 12/14/2020: TSH 5.430 high    Last refill:  Levothyroxine Sodium 200 MCG Oral Tab 38 tablet 0 2/25/2021     Sig: TAKE ONE TABLET BY MOUTH THREE DAYS A WEEK       Tim Lorenzo Mar 2, 2021  6:49 AM (CST)   Per pt now takes 200mcg daily was

## 2021-03-15 NOTE — TELEPHONE ENCOUNTER
Contacted patient to check on how she is doing. She states she is still having bleeding. She has an appt with Dr. Larissa Quesada in office today at [phone] and feels she can wait to discuss with her then.

## 2021-03-15 NOTE — TELEPHONE ENCOUNTER
From: Michelle Degroot  To: Hernan Butts DO  Sent: 3/12/2021 8:33 PM CST  Subject: Prescription Question    I'm all out of synthroid. The pharmacy said they are waiting for approval to fill it.  I just had a blood draw and T3 last checked in late Decemb

## 2021-03-15 NOTE — PROGRESS NOTES
GYN Post Operative Note    Mercy Elaine is a 47year old  who presents status post TLH, bilateral salpingectomy, cysto performed on 3/2/21 for menorrhagia, pelvic pain    Patient had called on Friday due to some vaginal bleeding.  She had repo TLH  1. Vitals WNL, afebrile  2. Post op visit: we reviewed potential etiologies. I suspect evolving cuff cellulitis. For now, I recommend we check CBC and CMP and start doxycyline. We reviewed warning si/sx of fever. She has no bowel/bladder symptoms.  We

## 2021-03-16 RX ORDER — AMOXICILLIN AND CLAVULANATE POTASSIUM 875; 125 MG/1; MG/1
1 TABLET, FILM COATED ORAL 2 TIMES DAILY
Qty: 14 TABLET | Refills: 0 | Status: SHIPPED | OUTPATIENT
Start: 2021-03-16 | End: 2021-04-05

## 2021-03-16 NOTE — PROGRESS NOTES
Pt is post op, see my note from yesterday. Please let her know her WBC count is very slightly elevated but otherwise labs look good.  I would recommend we actually instead of the doxy I sent yesterday, switch to Augmentin twice a daily until she returns to

## 2021-03-16 NOTE — PROGRESS NOTES
Patient informed of results. Verbalized understanding. No further questions or concerns. Will start abx tonight.

## 2021-03-17 ENCOUNTER — TELEPHONE (OUTPATIENT)
Dept: FAMILY MEDICINE CLINIC | Facility: CLINIC | Age: 41
End: 2021-03-17

## 2021-03-17 DIAGNOSIS — E06.3 HYPOTHYROIDISM DUE TO HASHIMOTO'S THYROIDITIS: Chronic | ICD-10-CM

## 2021-03-17 DIAGNOSIS — E03.8 HYPOTHYROIDISM DUE TO HASHIMOTO'S THYROIDITIS: Chronic | ICD-10-CM

## 2021-03-17 RX ORDER — LEVOTHYROXINE SODIUM 175 UG/1
TABLET ORAL
Qty: 60 TABLET | Refills: 1 | Status: SHIPPED | OUTPATIENT
Start: 2021-03-17 | End: 2021-08-26

## 2021-03-17 NOTE — TELEPHONE ENCOUNTER
Pt informed and states her correct dose of Levothyroxine is 175mcg 4 times a week and 200mcg 3 times a week.

## 2021-03-17 NOTE — TELEPHONE ENCOUNTER
Spoke to pt she states Levothyroxine 200mcg was sent to pharmacy but she also takes lexothyroxine 175mcg 4x//week also. Please advise

## 2021-03-17 NOTE — TELEPHONE ENCOUNTER
Pt said pharmacy will not give her the thyroid medication Dr. Kieran Patel prescribed on 3/15 - pls call to discuss. Pt said she's \"really struggling right now\" and has been out of meds for a few days. Wants to know what she should do?

## 2021-03-21 ENCOUNTER — ANESTHESIA EVENT (OUTPATIENT)
Dept: SURGERY | Facility: HOSPITAL | Age: 41
DRG: 920 | End: 2021-03-21
Payer: COMMERCIAL

## 2021-03-21 ENCOUNTER — HOSPITAL ENCOUNTER (INPATIENT)
Facility: HOSPITAL | Age: 41
LOS: 1 days | Discharge: HOME | DRG: 920 | End: 2021-03-23
Attending: OBSTETRICS & GYNECOLOGY | Admitting: OBSTETRICS & GYNECOLOGY
Payer: COMMERCIAL

## 2021-03-21 DIAGNOSIS — N93.9 VAGINAL BLEEDING: Primary | ICD-10-CM

## 2021-03-21 LAB
ALBUMIN SERPL-MCNC: 3.6 G/DL (ref 3.4–5)
ALBUMIN/GLOB SERPL: 0.9 {RATIO} (ref 1–2)
ALP LIVER SERPL-CCNC: 102 U/L
ALT SERPL-CCNC: 37 U/L
ANION GAP SERPL CALC-SCNC: 6 MMOL/L (ref 0–18)
APTT PPP: 32 SECONDS (ref 25.4–36.1)
AST SERPL-CCNC: 23 U/L (ref 15–37)
BASOPHILS # BLD AUTO: 0.02 X10(3) UL (ref 0–0.2)
BASOPHILS NFR BLD AUTO: 0.2 %
BILIRUB SERPL-MCNC: 0.2 MG/DL (ref 0.1–2)
BUN BLD-MCNC: 21 MG/DL (ref 7–18)
BUN/CREAT SERPL: 20.2 (ref 10–20)
CALCIUM BLD-MCNC: 9.4 MG/DL (ref 8.5–10.1)
CHLORIDE SERPL-SCNC: 103 MMOL/L (ref 98–112)
CO2 SERPL-SCNC: 28 MMOL/L (ref 21–32)
CREAT BLD-MCNC: 1.04 MG/DL
DEPRECATED RDW RBC AUTO: 41.7 FL (ref 35.1–46.3)
EOSINOPHIL # BLD AUTO: 0.01 X10(3) UL (ref 0–0.7)
EOSINOPHIL NFR BLD AUTO: 0.1 %
ERYTHROCYTE [DISTWIDTH] IN BLOOD BY AUTOMATED COUNT: 12.9 % (ref 11–15)
GLOBULIN PLAS-MCNC: 4.2 G/DL (ref 2.8–4.4)
GLUCOSE BLD-MCNC: 93 MG/DL (ref 70–99)
HCT VFR BLD AUTO: 40.8 %
HGB BLD-MCNC: 13.5 G/DL
IMM GRANULOCYTES # BLD AUTO: 0.05 X10(3) UL (ref 0–1)
IMM GRANULOCYTES NFR BLD: 0.5 %
INR BLD: 0.94 (ref 0.89–1.11)
LYMPHOCYTES # BLD AUTO: 3.51 X10(3) UL (ref 1–4)
LYMPHOCYTES NFR BLD AUTO: 35.3 %
M PROTEIN MFR SERPL ELPH: 7.8 G/DL (ref 6.4–8.2)
MCH RBC QN AUTO: 29.7 PG (ref 26–34)
MCHC RBC AUTO-ENTMCNC: 33.1 G/DL (ref 31–37)
MCV RBC AUTO: 89.9 FL
MONOCYTES # BLD AUTO: 0.71 X10(3) UL (ref 0.1–1)
MONOCYTES NFR BLD AUTO: 7.1 %
NEUTROPHILS # BLD AUTO: 5.65 X10 (3) UL (ref 1.5–7.7)
NEUTROPHILS # BLD AUTO: 5.65 X10(3) UL (ref 1.5–7.7)
NEUTROPHILS NFR BLD AUTO: 56.8 %
OSMOLALITY SERPL CALC.SUM OF ELEC: 287 MOSM/KG (ref 275–295)
PLATELET # BLD AUTO: 291 10(3)UL (ref 150–450)
POTASSIUM SERPL-SCNC: 4.2 MMOL/L (ref 3.5–5.1)
PSA SERPL DL<=0.01 NG/ML-MCNC: 12.9 SECONDS (ref 12.4–14.6)
RBC # BLD AUTO: 4.54 X10(6)UL
SARS-COV-2 RNA RESP QL NAA+PROBE: NOT DETECTED
SODIUM SERPL-SCNC: 137 MMOL/L (ref 136–145)
WBC # BLD AUTO: 10 X10(3) UL (ref 4–11)

## 2021-03-21 PROCEDURE — 99242 OFF/OP CONSLTJ NEW/EST SF 20: CPT | Performed by: OBSTETRICS & GYNECOLOGY

## 2021-03-21 RX ORDER — SODIUM CHLORIDE 9 MG/ML
INJECTION, SOLUTION INTRAVENOUS CONTINUOUS
Status: CANCELLED | OUTPATIENT
Start: 2021-03-21 | End: 2021-03-22

## 2021-03-22 ENCOUNTER — APPOINTMENT (OUTPATIENT)
Dept: CT IMAGING | Facility: HOSPITAL | Age: 41
DRG: 920 | End: 2021-03-22
Attending: OBSTETRICS & GYNECOLOGY
Payer: COMMERCIAL

## 2021-03-22 ENCOUNTER — ANESTHESIA (OUTPATIENT)
Dept: SURGERY | Facility: HOSPITAL | Age: 41
DRG: 920 | End: 2021-03-22
Payer: COMMERCIAL

## 2021-03-22 LAB
ALBUMIN SERPL-MCNC: 3.3 G/DL (ref 3.4–5)
ALBUMIN/GLOB SERPL: 0.8 {RATIO} (ref 1–2)
ALP LIVER SERPL-CCNC: 96 U/L
ALT SERPL-CCNC: 34 U/L
ANION GAP SERPL CALC-SCNC: 5 MMOL/L (ref 0–18)
APTT PPP: 31.9 SECONDS (ref 25.4–36.1)
AST SERPL-CCNC: 32 U/L (ref 15–37)
BASOPHILS # BLD AUTO: 0.01 X10(3) UL (ref 0–0.2)
BASOPHILS # BLD AUTO: 0.01 X10(3) UL (ref 0–0.2)
BASOPHILS NFR BLD AUTO: 0.1 %
BASOPHILS NFR BLD AUTO: 0.1 %
BILIRUB SERPL-MCNC: 0.3 MG/DL (ref 0.1–2)
BUN BLD-MCNC: 12 MG/DL (ref 7–18)
BUN/CREAT SERPL: 12.5 (ref 10–20)
CALCIUM BLD-MCNC: 9.5 MG/DL (ref 8.5–10.1)
CHLORIDE SERPL-SCNC: 105 MMOL/L (ref 98–112)
CO2 SERPL-SCNC: 27 MMOL/L (ref 21–32)
CREAT BLD-MCNC: 0.96 MG/DL
DEPRECATED RDW RBC AUTO: 40.4 FL (ref 35.1–46.3)
DEPRECATED RDW RBC AUTO: 41.1 FL (ref 35.1–46.3)
EOSINOPHIL # BLD AUTO: 0 X10(3) UL (ref 0–0.7)
EOSINOPHIL # BLD AUTO: 0 X10(3) UL (ref 0–0.7)
EOSINOPHIL NFR BLD AUTO: 0 %
EOSINOPHIL NFR BLD AUTO: 0 %
ERYTHROCYTE [DISTWIDTH] IN BLOOD BY AUTOMATED COUNT: 12.9 % (ref 11–15)
ERYTHROCYTE [DISTWIDTH] IN BLOOD BY AUTOMATED COUNT: 13 % (ref 11–15)
FIBRINOGEN: 426 MG/DL (ref 200–446)
GLOBULIN PLAS-MCNC: 4.1 G/DL (ref 2.8–4.4)
GLUCOSE BLD-MCNC: 163 MG/DL (ref 70–99)
HCT VFR BLD AUTO: 36.7 %
HCT VFR BLD AUTO: 39.8 %
HGB BLD-MCNC: 12.1 G/DL
HGB BLD-MCNC: 13.1 G/DL
IMM GRANULOCYTES # BLD AUTO: 0.05 X10(3) UL (ref 0–1)
IMM GRANULOCYTES # BLD AUTO: 0.09 X10(3) UL (ref 0–1)
IMM GRANULOCYTES NFR BLD: 0.4 %
IMM GRANULOCYTES NFR BLD: 0.5 %
INR BLD: 0.99 (ref 0.89–1.11)
LYMPHOCYTES # BLD AUTO: 0.76 X10(3) UL (ref 1–4)
LYMPHOCYTES # BLD AUTO: 1.52 X10(3) UL (ref 1–4)
LYMPHOCYTES NFR BLD AUTO: 13.2 %
LYMPHOCYTES NFR BLD AUTO: 4.2 %
M PROTEIN MFR SERPL ELPH: 7.4 G/DL (ref 6.4–8.2)
MCH RBC QN AUTO: 29 PG (ref 26–34)
MCH RBC QN AUTO: 29.4 PG (ref 26–34)
MCHC RBC AUTO-ENTMCNC: 32.9 G/DL (ref 31–37)
MCHC RBC AUTO-ENTMCNC: 33 G/DL (ref 31–37)
MCV RBC AUTO: 88 FL
MCV RBC AUTO: 89.2 FL
MONOCYTES # BLD AUTO: 0.54 X10(3) UL (ref 0.1–1)
MONOCYTES # BLD AUTO: 0.7 X10(3) UL (ref 0.1–1)
MONOCYTES NFR BLD AUTO: 3 %
MONOCYTES NFR BLD AUTO: 6.1 %
NEUTROPHILS # BLD AUTO: 16.79 X10 (3) UL (ref 1.5–7.7)
NEUTROPHILS # BLD AUTO: 16.79 X10(3) UL (ref 1.5–7.7)
NEUTROPHILS # BLD AUTO: 9.26 X10 (3) UL (ref 1.5–7.7)
NEUTROPHILS # BLD AUTO: 9.26 X10(3) UL (ref 1.5–7.7)
NEUTROPHILS NFR BLD AUTO: 80.2 %
NEUTROPHILS NFR BLD AUTO: 92.2 %
OSMOLALITY SERPL CALC.SUM OF ELEC: 287 MOSM/KG (ref 275–295)
PLATELET # BLD AUTO: 270 10(3)UL (ref 150–450)
PLATELET # BLD AUTO: 274 10(3)UL (ref 150–450)
POTASSIUM SERPL-SCNC: 4.7 MMOL/L (ref 3.5–5.1)
PSA SERPL DL<=0.01 NG/ML-MCNC: 13.4 SECONDS (ref 12.4–14.6)
RBC # BLD AUTO: 4.17 X10(6)UL
RBC # BLD AUTO: 4.46 X10(6)UL
SODIUM SERPL-SCNC: 137 MMOL/L (ref 136–145)
WBC # BLD AUTO: 11.5 X10(3) UL (ref 4–11)
WBC # BLD AUTO: 18.2 X10(3) UL (ref 4–11)

## 2021-03-22 PROCEDURE — 57200 REPAIR OF VAGINA: CPT | Performed by: OBSTETRICS & GYNECOLOGY

## 2021-03-22 PROCEDURE — 74178 CT ABD&PLV WO CNTR FLWD CNTR: CPT | Performed by: OBSTETRICS & GYNECOLOGY

## 2021-03-22 PROCEDURE — 0UQG7ZZ REPAIR VAGINA, VIA NATURAL OR ARTIFICIAL OPENING: ICD-10-PCS | Performed by: OBSTETRICS & GYNECOLOGY

## 2021-03-22 RX ORDER — ONDANSETRON 2 MG/ML
4 INJECTION INTRAMUSCULAR; INTRAVENOUS EVERY 8 HOURS PRN
Status: DISCONTINUED | OUTPATIENT
Start: 2021-03-22 | End: 2021-03-23

## 2021-03-22 RX ORDER — DEXAMETHASONE SODIUM PHOSPHATE 4 MG/ML
4 VIAL (ML) INJECTION AS NEEDED
Status: DISCONTINUED | OUTPATIENT
Start: 2021-03-22 | End: 2021-03-22 | Stop reason: HOSPADM

## 2021-03-22 RX ORDER — SODIUM CHLORIDE, SODIUM LACTATE, POTASSIUM CHLORIDE, CALCIUM CHLORIDE 600; 310; 30; 20 MG/100ML; MG/100ML; MG/100ML; MG/100ML
INJECTION, SOLUTION INTRAVENOUS CONTINUOUS
Status: DISCONTINUED | OUTPATIENT
Start: 2021-03-22 | End: 2021-03-22 | Stop reason: HOSPADM

## 2021-03-22 RX ORDER — CEFAZOLIN SODIUM/WATER 2 G/20 ML
SYRINGE (ML) INTRAVENOUS AS NEEDED
Status: DISCONTINUED | OUTPATIENT
Start: 2021-03-22 | End: 2021-03-22 | Stop reason: SURG

## 2021-03-22 RX ORDER — SODIUM CHLORIDE, SODIUM LACTATE, POTASSIUM CHLORIDE, CALCIUM CHLORIDE 600; 310; 30; 20 MG/100ML; MG/100ML; MG/100ML; MG/100ML
INJECTION, SOLUTION INTRAVENOUS CONTINUOUS
Status: DISCONTINUED | OUTPATIENT
Start: 2021-03-22 | End: 2021-03-23

## 2021-03-22 RX ORDER — LABETALOL HYDROCHLORIDE 5 MG/ML
5 INJECTION, SOLUTION INTRAVENOUS EVERY 5 MIN PRN
Status: DISCONTINUED | OUTPATIENT
Start: 2021-03-22 | End: 2021-03-22 | Stop reason: HOSPADM

## 2021-03-22 RX ORDER — VENLAFAXINE HYDROCHLORIDE 75 MG/1
150 CAPSULE, EXTENDED RELEASE ORAL 2 TIMES DAILY
Status: DISCONTINUED | OUTPATIENT
Start: 2021-03-22 | End: 2021-03-23

## 2021-03-22 RX ORDER — LIDOCAINE HYDROCHLORIDE 10 MG/ML
INJECTION, SOLUTION EPIDURAL; INFILTRATION; INTRACAUDAL; PERINEURAL AS NEEDED
Status: DISCONTINUED | OUTPATIENT
Start: 2021-03-22 | End: 2021-03-22 | Stop reason: SURG

## 2021-03-22 RX ORDER — HYDROMORPHONE HYDROCHLORIDE 1 MG/ML
0.4 INJECTION, SOLUTION INTRAMUSCULAR; INTRAVENOUS; SUBCUTANEOUS EVERY 5 MIN PRN
Status: DISCONTINUED | OUTPATIENT
Start: 2021-03-22 | End: 2021-03-22 | Stop reason: HOSPADM

## 2021-03-22 RX ORDER — MEPERIDINE HYDROCHLORIDE 25 MG/ML
12.5 INJECTION INTRAMUSCULAR; INTRAVENOUS; SUBCUTANEOUS AS NEEDED
Status: DISCONTINUED | OUTPATIENT
Start: 2021-03-22 | End: 2021-03-22 | Stop reason: HOSPADM

## 2021-03-22 RX ORDER — LEVOTHYROXINE SODIUM 0.2 MG/1
200 TABLET ORAL
Status: DISCONTINUED | OUTPATIENT
Start: 2021-03-22 | End: 2021-03-23

## 2021-03-22 RX ORDER — ONDANSETRON 2 MG/ML
INJECTION INTRAMUSCULAR; INTRAVENOUS
Status: COMPLETED
Start: 2021-03-22 | End: 2021-03-22

## 2021-03-22 RX ORDER — HYDROCODONE BITARTRATE AND ACETAMINOPHEN 5; 325 MG/1; MG/1
1 TABLET ORAL AS NEEDED
Status: DISCONTINUED | OUTPATIENT
Start: 2021-03-22 | End: 2021-03-22 | Stop reason: HOSPADM

## 2021-03-22 RX ORDER — IBUPROFEN 600 MG/1
600 TABLET ORAL EVERY 6 HOURS PRN
Status: DISCONTINUED | OUTPATIENT
Start: 2021-03-22 | End: 2021-03-23

## 2021-03-22 RX ORDER — DIPHENHYDRAMINE HYDROCHLORIDE 50 MG/ML
12.5 INJECTION INTRAMUSCULAR; INTRAVENOUS AS NEEDED
Status: DISCONTINUED | OUTPATIENT
Start: 2021-03-22 | End: 2021-03-22 | Stop reason: HOSPADM

## 2021-03-22 RX ORDER — ONDANSETRON 2 MG/ML
4 INJECTION INTRAMUSCULAR; INTRAVENOUS AS NEEDED
Status: DISCONTINUED | OUTPATIENT
Start: 2021-03-22 | End: 2021-03-22 | Stop reason: HOSPADM

## 2021-03-22 RX ORDER — VENLAFAXINE HYDROCHLORIDE 75 MG/1
300 CAPSULE, EXTENDED RELEASE ORAL 2 TIMES DAILY
Status: DISCONTINUED | OUTPATIENT
Start: 2021-03-22 | End: 2021-03-22

## 2021-03-22 RX ORDER — TRANEXAMIC ACID 10 MG/ML
1000 INJECTION, SOLUTION INTRAVENOUS ONCE
Status: COMPLETED | OUTPATIENT
Start: 2021-03-22 | End: 2021-03-22

## 2021-03-22 RX ORDER — AMOXICILLIN AND CLAVULANATE POTASSIUM 875; 125 MG/1; MG/1
1 TABLET, FILM COATED ORAL 2 TIMES DAILY
Status: DISCONTINUED | OUTPATIENT
Start: 2021-03-22 | End: 2021-03-22

## 2021-03-22 RX ORDER — METOPROLOL SUCCINATE 100 MG/1
100 TABLET, EXTENDED RELEASE ORAL DAILY
Status: DISCONTINUED | OUTPATIENT
Start: 2021-03-22 | End: 2021-03-23

## 2021-03-22 RX ORDER — LABETALOL HYDROCHLORIDE 5 MG/ML
INJECTION, SOLUTION INTRAVENOUS AS NEEDED
Status: DISCONTINUED | OUTPATIENT
Start: 2021-03-22 | End: 2021-03-22 | Stop reason: SURG

## 2021-03-22 RX ORDER — BUSPIRONE HYDROCHLORIDE 5 MG/1
15 TABLET ORAL 2 TIMES DAILY
Status: DISCONTINUED | OUTPATIENT
Start: 2021-03-22 | End: 2021-03-23

## 2021-03-22 RX ORDER — HYDROCODONE BITARTRATE AND ACETAMINOPHEN 5; 325 MG/1; MG/1
2 TABLET ORAL AS NEEDED
Status: DISCONTINUED | OUTPATIENT
Start: 2021-03-22 | End: 2021-03-22 | Stop reason: HOSPADM

## 2021-03-22 RX ORDER — NALOXONE HYDROCHLORIDE 0.4 MG/ML
80 INJECTION, SOLUTION INTRAMUSCULAR; INTRAVENOUS; SUBCUTANEOUS AS NEEDED
Status: DISCONTINUED | OUTPATIENT
Start: 2021-03-22 | End: 2021-03-22 | Stop reason: HOSPADM

## 2021-03-22 RX ORDER — ALPRAZOLAM 0.25 MG/1
0.25 TABLET ORAL 3 TIMES DAILY PRN
COMMUNITY
End: 2021-05-04

## 2021-03-22 RX ORDER — ACETAMINOPHEN 500 MG
1000 TABLET ORAL EVERY 6 HOURS PRN
Status: DISCONTINUED | OUTPATIENT
Start: 2021-03-22 | End: 2021-03-23

## 2021-03-22 RX ORDER — METOCLOPRAMIDE HYDROCHLORIDE 5 MG/ML
10 INJECTION INTRAMUSCULAR; INTRAVENOUS AS NEEDED
Status: DISCONTINUED | OUTPATIENT
Start: 2021-03-22 | End: 2021-03-22 | Stop reason: HOSPADM

## 2021-03-22 RX ORDER — ONDANSETRON 4 MG/1
4 TABLET, FILM COATED ORAL EVERY 8 HOURS PRN
Status: DISCONTINUED | OUTPATIENT
Start: 2021-03-22 | End: 2021-03-23

## 2021-03-22 RX ORDER — SODIUM CHLORIDE, SODIUM LACTATE, POTASSIUM CHLORIDE, CALCIUM CHLORIDE 600; 310; 30; 20 MG/100ML; MG/100ML; MG/100ML; MG/100ML
INJECTION, SOLUTION INTRAVENOUS CONTINUOUS PRN
Status: DISCONTINUED | OUTPATIENT
Start: 2021-03-22 | End: 2021-03-22 | Stop reason: SURG

## 2021-03-22 RX ORDER — MIDAZOLAM HYDROCHLORIDE 1 MG/ML
1 INJECTION INTRAMUSCULAR; INTRAVENOUS EVERY 5 MIN PRN
Status: DISCONTINUED | OUTPATIENT
Start: 2021-03-22 | End: 2021-03-22 | Stop reason: HOSPADM

## 2021-03-22 RX ORDER — ALPRAZOLAM 0.25 MG/1
0.25 TABLET ORAL 3 TIMES DAILY PRN
Status: DISCONTINUED | OUTPATIENT
Start: 2021-03-22 | End: 2021-03-23

## 2021-03-22 RX ADMIN — SODIUM CHLORIDE, SODIUM LACTATE, POTASSIUM CHLORIDE, CALCIUM CHLORIDE: 600; 310; 30; 20 INJECTION, SOLUTION INTRAVENOUS at 01:40:00

## 2021-03-22 RX ADMIN — LIDOCAINE HYDROCHLORIDE 50 MG: 10 INJECTION, SOLUTION EPIDURAL; INFILTRATION; INTRACAUDAL; PERINEURAL at 00:35:00

## 2021-03-22 RX ADMIN — SODIUM CHLORIDE, SODIUM LACTATE, POTASSIUM CHLORIDE, CALCIUM CHLORIDE: 600; 310; 30; 20 INJECTION, SOLUTION INTRAVENOUS at 00:34:00

## 2021-03-22 RX ADMIN — SODIUM CHLORIDE, SODIUM LACTATE, POTASSIUM CHLORIDE, CALCIUM CHLORIDE: 600; 310; 30; 20 INJECTION, SOLUTION INTRAVENOUS at 01:05:00

## 2021-03-22 RX ADMIN — CEFAZOLIN SODIUM/WATER 2 G: 2 G/20 ML SYRINGE (ML) INTRAVENOUS at 00:39:00

## 2021-03-22 RX ADMIN — LABETALOL HYDROCHLORIDE 10 MG: 5 INJECTION, SOLUTION INTRAVENOUS at 00:44:00

## 2021-03-22 NOTE — BRIEF OP NOTE
Pre-Operative Diagnosis: Vaginal bleeding [N93.9]     Post-Operative Diagnosis: Same     Procedure Performed:   Vaginal exam under anesthesia, suturing of vaginal cuff    Surgeon(s) and Role:     Tim Saucedo MD - Primary    Assistant(s):   None     Herzog

## 2021-03-22 NOTE — ANESTHESIA PREPROCEDURE EVALUATION
PRE-OP EVALUATION    Patient Name: Lynda Clark    Pre-op Diagnosis: Menometrorrhagia [N92.1]    Procedure(s):  total laparoscopic hysterectomy, bilateral salpingectomy and cystoscopy    Surgeon(s) and Role:     Cele Dominguez MD - Primary    P Tobacco Use      Smoking status: Never Smoker      Smokeless tobacco: Never Used    Alcohol use: Yes      Alcohol/week: 0.0 standard drinks      Comment: very rare      Drug use: No     Available pre-op labs reviewed.   Lab Results   Component Value Date

## 2021-03-22 NOTE — PROGRESS NOTES
GYN Progress note    I returned to see patient this afternoon. She reports still is having bleeding but darker in color. Nothing bright red. Still no pain or other complaints.     O:    03/22/21  1726   BP: (!) 143/99   Pulse: 80   Resp: 18   Temp: 98 °F (3 is draining, vs could have formed yesterday with new onset bleeding.  Either way, we reviewed since her HB has been 13 on both lab draws, if active bleeding, I would have suspected a more significant drop  -We reviewed hematomas may take several weeks to re

## 2021-03-22 NOTE — OPERATIVE REPORT
Pre-Operative Diagnosis: Vaginal bleeding [N93.9]     Post-Operative Diagnosis: Same     Procedure Performed:   Vaginal exam under anesthesia, suturing of vaginal cuff    Surgeon(s) and Role:     Autumn Durbin MD - Primary    Assistant(s):   None     Herzog

## 2021-03-22 NOTE — CONSULTS
[facility]  Obstetrics and Gynecology  Gynecologic History & Physical    Lynda Clark Patient Status:  Emergency    1980 MRN [id number]   Location [address] Attending Alfie Ocasio, [address] [place] [address],[address] Day 0 PCP L admission)    Allergies:    Levaquin [Levofloxa*    DIARRHEA     Past GYN History: as above     Social History:  Social History    Tobacco Use      Smoking status: Never Smoker      Smokeless tobacco: Never Used    Alcohol use:  Yes      Alcohol/week: 0.0 s Hypovitaminosis D     Obesity (BMI 35.0-39.9 without comorbidity)     Therapeutic drug monitoring     Class 2 obesity without serious comorbidity with body mass index (BMI) of 38.0 to 38.9 in adult     Essential hypertension     Status post hysterectomy

## 2021-03-22 NOTE — ANESTHESIA PROCEDURE NOTES
Airway  Date/Time: 3/22/2021 12:40 AM  Urgency: elective    Airway not difficult    General Information and Staff    Patient location during procedure: OR  Anesthesiologist: Jorge Masters MD  Performed: anesthesiologist     Indications and Patient Con

## 2021-03-22 NOTE — ED INITIAL ASSESSMENT (HPI)
Pt reports she had a hysterectomy on march 2nd and reports she has been having vaginal bleeding since then. Reports the bleeding worsened today and she is going through two pads an hour and passing large clots.

## 2021-03-22 NOTE — PROGRESS NOTES
GYN POD#0    S: Pt tearful this AM--worried as she reports she is still bleeding. Has no pain.   Is up in bed in chair  Reports since I saw her in the office she was actually feeling much better--abdomen less distended, pain had resolved, was taking less ib today  -She was being treated for mild vaginal cuff cellulitis with Augmentin and was feeling much better.  However, we reviewed potential retracted vessels, rebleeding in the cuff or intraperitoneal that can occur.  -When she presented to ED the bleeding w

## 2021-03-22 NOTE — ANESTHESIA POSTPROCEDURE EVALUATION
[address] [address] Patient Status:  Emergency   Age/Gender 47year old female MRN [id number]   [facility] SURGERY Attending Carmen Fuchs MD   [facility] Day # 0 PCP Jhon Pollock DO       Anesthesia Post-op Note    VAGINAL EXAM

## 2021-03-22 NOTE — PROGRESS NOTES
Pt arrived on unit via transport to room 323 from pacu. Pt resting in bed, easy non labored breathing on ra. Vs wnl. Pt denies any pain or nausea. Iv fluids infusing without difficulty. Packing and peripad, mesh brief sinplace. No drainage noted on pad.  Pt

## 2021-03-22 NOTE — ED NOTES
Patient alert and oriented x3. No respiratory distress noted. Skin pink, warm, and dry. Patient presents with vaginal bleeding since 19:30. Patient reports she had a hysterectomy for endometriosis on 3/2.   Patient has had brown spotting since the christina

## 2021-03-22 NOTE — ED PROVIDER NOTES
Patient Seen in: [facility] Surgery      History   Patient presents with:  Renu-RCAHEL    Stated Complaint: eval g     HPI/Subjective:   HPI    21-year-old female presents emerged part with chief complaint of vaginal bleeding that started earlier tonight Triage Vitals [03/21/21 2129]   BP (!) 137/107   Pulse 82   Resp 18   Temp 98.8 °F (37.1 °C)   Temp src    SpO2 99 %   O2 Device None (Room air)       Current:BP (!) 130/94   Pulse 81   Temp 98.8 °F (37.1 °C)   Resp 18   Ht 170.2 cm (5' 7\")   Wt 111.1 kg Patient will be taken to the OR for further evaluation of the active bleeding for possible suturing and vaginal packing. Discussed all laboratory results with the patient.   Patient remain n.p.o. will be taken to the OR  Admission disposition: 3/21/2021 11

## 2021-03-22 NOTE — PLAN OF CARE
Patient A/O X 4, VSS, IVF infusing per orders. Denies pain. Seble pad in place.      Problem: Patient/Family Goals  Goal: Patient/Family Long Term Goal  Description: Patient's Long Term Goal: Discharge home    Interventions:  -Take medications as ordered  -

## 2021-03-22 NOTE — PROGRESS NOTES
Patient calls complaining of vaginal bleeding starting this evening. Has not been doing any unusual activity. Only walking. Passed small clots and now with persistent trickling of bright red blood. No fever chills nausea or vomiting. No pain.   Has oth

## 2021-03-23 VITALS
DIASTOLIC BLOOD PRESSURE: 91 MMHG | BODY MASS INDEX: 38.45 KG/M2 | HEART RATE: 77 BPM | RESPIRATION RATE: 18 BRPM | WEIGHT: 245 LBS | TEMPERATURE: 98 F | HEIGHT: 67 IN | OXYGEN SATURATION: 98 % | SYSTOLIC BLOOD PRESSURE: 143 MMHG

## 2021-03-23 LAB
BASOPHILS # BLD AUTO: 0 X10(3) UL (ref 0–0.2)
BASOPHILS NFR BLD AUTO: 0 %
DEPRECATED RDW RBC AUTO: 43.3 FL (ref 35.1–46.3)
EOSINOPHIL # BLD AUTO: 0 X10(3) UL (ref 0–0.7)
EOSINOPHIL NFR BLD AUTO: 0 %
ERYTHROCYTE [DISTWIDTH] IN BLOOD BY AUTOMATED COUNT: 13.3 % (ref 11–15)
HCT VFR BLD AUTO: 35.9 %
HGB BLD-MCNC: 11.5 G/DL
IMM GRANULOCYTES # BLD AUTO: 0.03 X10(3) UL (ref 0–1)
IMM GRANULOCYTES NFR BLD: 0.5 %
LYMPHOCYTES # BLD AUTO: 1.44 X10(3) UL (ref 1–4)
LYMPHOCYTES NFR BLD AUTO: 22.9 %
MCH RBC QN AUTO: 29.1 PG (ref 26–34)
MCHC RBC AUTO-ENTMCNC: 32 G/DL (ref 31–37)
MCV RBC AUTO: 90.9 FL
MONOCYTES # BLD AUTO: 0.53 X10(3) UL (ref 0.1–1)
MONOCYTES NFR BLD AUTO: 8.4 %
NEUTROPHILS # BLD AUTO: 4.3 X10 (3) UL (ref 1.5–7.7)
NEUTROPHILS # BLD AUTO: 4.3 X10(3) UL (ref 1.5–7.7)
NEUTROPHILS NFR BLD AUTO: 68.2 %
PLATELET # BLD AUTO: 200 10(3)UL (ref 150–450)
RBC # BLD AUTO: 3.95 X10(6)UL
WBC # BLD AUTO: 6.3 X10(3) UL (ref 4–11)

## 2021-03-23 RX ORDER — TRANEXAMIC ACID 650 1/1
1300 TABLET ORAL 3 TIMES DAILY
Qty: 30 TABLET | Refills: 0 | Status: SHIPPED | OUTPATIENT
Start: 2021-03-23 | End: 2021-03-28

## 2021-03-23 RX ORDER — AMOXICILLIN AND CLAVULANATE POTASSIUM 875; 125 MG/1; MG/1
1 TABLET, FILM COATED ORAL 2 TIMES DAILY
Qty: 2 TABLET | Refills: 0 | Status: SHIPPED | OUTPATIENT
Start: 2021-03-23 | End: 2021-03-24

## 2021-03-23 NOTE — PROGRESS NOTES
GYN PN    S: Pt reports she had a good night with very scant bleeding on the pad. This AM did note a small amount of bright red spotting, but no clots, and would describe as spotting. Still no pain, fevers, chills.  BR spotting happened after BM which was n BRVB or heavy bleeding, will proceed with CT angio to assess for any active bleeding. However, otherwise can likely dc home later today with close OP follow up  3. Leukocytosis has resolved--was being treated for suspected mild cuff cellulitis.  Continue Zo

## 2021-03-23 NOTE — PLAN OF CARE
Patient A/O X 4, VSS, IVF infusing per orders. Tolerating general diet. Denies pain.  Monitoring output to peripad, patient to notify when saturated per MD.     Problem: Patient/Family Goals  Goal: Patient/Family Long Term Goal  Description: Patient's Long Anticipate increased pain with activity and pre-medicate as appropriate  Outcome: Progressing     Problem: RISK FOR INFECTION - ADULT  Goal: Absence of fever/infection during anticipated neutropenic period  Description: INTERVENTIONS  - Monitor WBC  - Admi

## 2021-03-23 NOTE — PLAN OF CARE
Patient A&Ox4, denies pain. VSS. IVF/abx continued. Diet advanced, patient tolerated well. Voiding, urine is clear yellow at this time. Scant amount of bleeding on peripad noted. Per Patient bleeding has significantly decreased.  Questions and concerns addr analgesics based on type and severity of pain and evaluate response  - Implement non-pharmacological measures as appropriate and evaluate response  - Consider cultural and social influences on pain and pain management  - Manage/alleviate anxiety  - Utilize

## 2021-03-23 NOTE — PROGRESS NOTES
Spoke with MD Metcalf about hemoglobin of 12.1 and small to moderating amount of bleeding on peripad. Okay to advance patient to regular diet. Will continue to monitor bleeding overnight, to update if anything worsens and MD will put in additional orders.  P

## 2021-03-23 NOTE — DISCHARGE SUMMARY
[facility]  Discharge Summary    Scottie Merlos Patient Status:  Inpatient    1980 MRN [id number]   [facility] 3NW-A Attending No att. providers found   Hosp Day # 1 PCP Jhon Pollock DO     Date of Admission: 3/21/2021    Da Potential duplicate medications found. Please discuss with provider. CONTINUE these medications which have NOT CHANGED    ALPRAZolam 0.25 MG Oral Tab  Take 0.25 mg by mouth 3 (three) times daily as needed., Historical    !!  Levothyroxine Sodium 175 MC

## 2021-03-23 NOTE — PROGRESS NOTES
GYN PN    Came back to re-assess patient. Since packing removal, she has not saturated any pads. She has had a small amount of dark red blood on the pad.  She reports the only episode of increased bleeding was when she urinated noted brighter red blood in t

## 2021-03-25 ENCOUNTER — OFFICE VISIT (OUTPATIENT)
Dept: OBGYN CLINIC | Facility: CLINIC | Age: 41
End: 2021-03-25

## 2021-03-25 VITALS
BODY MASS INDEX: 38 KG/M2 | HEART RATE: 97 BPM | DIASTOLIC BLOOD PRESSURE: 62 MMHG | WEIGHT: 244.38 LBS | SYSTOLIC BLOOD PRESSURE: 124 MMHG

## 2021-03-25 DIAGNOSIS — Z90.710 STATUS POST HYSTERECTOMY: Primary | ICD-10-CM

## 2021-03-25 PROCEDURE — 3078F DIAST BP <80 MM HG: CPT | Performed by: OBSTETRICS & GYNECOLOGY

## 2021-03-25 PROCEDURE — 3074F SYST BP LT 130 MM HG: CPT | Performed by: OBSTETRICS & GYNECOLOGY

## 2021-03-25 PROCEDURE — 99024 POSTOP FOLLOW-UP VISIT: CPT | Performed by: OBSTETRICS & GYNECOLOGY

## 2021-03-25 NOTE — PROGRESS NOTES
GYN Post Operative Note    Johana Titus is a 47year old  who presents status post TLH, bilateral salpingectomy performed on 3/2/21 for menorrhagia and pelvic pain. Post op course initially unremarkable.  However, was seen in the office on 3/ afebrile  2. Post op visit: reviewed precautions. We reviewed anticipated course over the next 3 weeks. Continue at home rest, ok for short walks.  We reviewed the hematoma will continue to drain, but did review if active, bright red bleeding, fevers, drama

## 2021-04-05 ENCOUNTER — OFFICE VISIT (OUTPATIENT)
Dept: OBGYN CLINIC | Facility: CLINIC | Age: 41
End: 2021-04-05

## 2021-04-05 VITALS
BODY MASS INDEX: 38.92 KG/M2 | SYSTOLIC BLOOD PRESSURE: 118 MMHG | HEIGHT: 67 IN | DIASTOLIC BLOOD PRESSURE: 80 MMHG | WEIGHT: 248 LBS

## 2021-04-05 DIAGNOSIS — Z98.890 POST-OPERATIVE STATE: Primary | ICD-10-CM

## 2021-04-05 PROCEDURE — 99024 POSTOP FOLLOW-UP VISIT: CPT | Performed by: OBSTETRICS & GYNECOLOGY

## 2021-04-05 PROCEDURE — 3008F BODY MASS INDEX DOCD: CPT | Performed by: OBSTETRICS & GYNECOLOGY

## 2021-04-05 PROCEDURE — 3074F SYST BP LT 130 MM HG: CPT | Performed by: OBSTETRICS & GYNECOLOGY

## 2021-04-05 PROCEDURE — 3079F DIAST BP 80-89 MM HG: CPT | Performed by: OBSTETRICS & GYNECOLOGY

## 2021-04-05 NOTE — PROGRESS NOTES
Subjective:  Patient presents with:  Post-Op: 4 week     Patient presents 4 weeks status post laparoscopic hysterectomy and bilateral salpingectomy with post operative vaginal bleeding and cuff hematoma requiring resuturing 3/22/21.   Currently without comp

## 2021-04-09 DIAGNOSIS — Z23 NEED FOR VACCINATION: ICD-10-CM

## 2021-04-21 ENCOUNTER — TELEPHONE (OUTPATIENT)
Dept: OBGYN CLINIC | Facility: CLINIC | Age: 41
End: 2021-04-21

## 2021-04-21 ENCOUNTER — OFFICE VISIT (OUTPATIENT)
Dept: OBGYN CLINIC | Facility: CLINIC | Age: 41
End: 2021-04-21

## 2021-04-21 VITALS
HEART RATE: 88 BPM | DIASTOLIC BLOOD PRESSURE: 76 MMHG | BODY MASS INDEX: 39.49 KG/M2 | WEIGHT: 251.63 LBS | HEIGHT: 67 IN | SYSTOLIC BLOOD PRESSURE: 122 MMHG

## 2021-04-21 DIAGNOSIS — Z98.890 POST-OPERATIVE STATE: Primary | ICD-10-CM

## 2021-04-21 PROCEDURE — 3078F DIAST BP <80 MM HG: CPT | Performed by: OBSTETRICS & GYNECOLOGY

## 2021-04-21 PROCEDURE — 3074F SYST BP LT 130 MM HG: CPT | Performed by: OBSTETRICS & GYNECOLOGY

## 2021-04-21 PROCEDURE — 99024 POSTOP FOLLOW-UP VISIT: CPT | Performed by: OBSTETRICS & GYNECOLOGY

## 2021-04-21 PROCEDURE — 3008F BODY MASS INDEX DOCD: CPT | Performed by: OBSTETRICS & GYNECOLOGY

## 2021-04-21 NOTE — TELEPHONE ENCOUNTER
Pt states doctor was to have a return to work note generated into her mychart. She is expected back to work tomorrow and needs this note.      She had appt today in [facility]

## 2021-04-21 NOTE — PROGRESS NOTES
Subjective:  Patient presents with:  Post-Op: Cramping minimal, no bleeding for 3-4 weeks     Patient presents 6 weeks status post TLH/BS. Currently without complaints. Tolerating general diet. Regular bowel movements. No urinary complaints.  Pain well

## 2021-05-27 ENCOUNTER — OFFICE VISIT (OUTPATIENT)
Dept: OBGYN CLINIC | Facility: CLINIC | Age: 41
End: 2021-05-27

## 2021-05-27 VITALS
HEART RATE: 82 BPM | WEIGHT: 258 LBS | DIASTOLIC BLOOD PRESSURE: 80 MMHG | BODY MASS INDEX: 40.49 KG/M2 | HEIGHT: 67 IN | SYSTOLIC BLOOD PRESSURE: 122 MMHG

## 2021-05-27 DIAGNOSIS — Z98.890 POST-OPERATIVE STATE: Primary | ICD-10-CM

## 2021-05-27 PROCEDURE — 99024 POSTOP FOLLOW-UP VISIT: CPT | Performed by: OBSTETRICS & GYNECOLOGY

## 2021-05-27 PROCEDURE — 3008F BODY MASS INDEX DOCD: CPT | Performed by: OBSTETRICS & GYNECOLOGY

## 2021-05-27 PROCEDURE — 3074F SYST BP LT 130 MM HG: CPT | Performed by: OBSTETRICS & GYNECOLOGY

## 2021-05-27 PROCEDURE — 3079F DIAST BP 80-89 MM HG: CPT | Performed by: OBSTETRICS & GYNECOLOGY

## 2021-05-27 NOTE — PROGRESS NOTES
Subjective:  Patient presents with:  Post-Op: 3/2/21  TLH/BS   3/22/21  suturing vaginal cuff    Patient presents 10 weeks status post TLH/bilateral salpingectomy with post operative cuff bleeding requiring suturing. Currently without complaints.   Rachana Oconnor

## 2021-06-26 ENCOUNTER — OFFICE VISIT (OUTPATIENT)
Dept: FAMILY MEDICINE CLINIC | Facility: CLINIC | Age: 41
End: 2021-06-26

## 2021-06-26 VITALS
TEMPERATURE: 96 F | BODY MASS INDEX: 39.24 KG/M2 | HEART RATE: 84 BPM | OXYGEN SATURATION: 99 % | RESPIRATION RATE: 18 BRPM | DIASTOLIC BLOOD PRESSURE: 82 MMHG | WEIGHT: 250 LBS | HEIGHT: 67 IN | SYSTOLIC BLOOD PRESSURE: 118 MMHG

## 2021-06-26 DIAGNOSIS — J01.40 ACUTE PANSINUSITIS, RECURRENCE NOT SPECIFIED: Primary | ICD-10-CM

## 2021-06-26 PROCEDURE — 3074F SYST BP LT 130 MM HG: CPT | Performed by: NURSE PRACTITIONER

## 2021-06-26 PROCEDURE — 99213 OFFICE O/P EST LOW 20 MIN: CPT | Performed by: NURSE PRACTITIONER

## 2021-06-26 PROCEDURE — 3008F BODY MASS INDEX DOCD: CPT | Performed by: NURSE PRACTITIONER

## 2021-06-26 PROCEDURE — 3079F DIAST BP 80-89 MM HG: CPT | Performed by: NURSE PRACTITIONER

## 2021-06-26 RX ORDER — AMOXICILLIN AND CLAVULANATE POTASSIUM 875; 125 MG/1; MG/1
1 TABLET, FILM COATED ORAL 2 TIMES DAILY
Qty: 20 TABLET | Refills: 0 | Status: SHIPPED | OUTPATIENT
Start: 2021-06-26 | End: 2021-07-06

## 2021-06-26 NOTE — PATIENT INSTRUCTIONS
Flonase daily  Benadryl at night  Start antibiotics in 1-2 days for worsening symptoms or no improvement    Sinusitis (No Antibiotics)    The sinuses are air-filled spaces in the bones of the face.  They connect to the inside of the nose. Sinusitis is red They can raise blood pressure.)  · Over-the-counter antihistamines or steroid nasal sprays, if advised by the healthcare provider, may help if allergies helped cause your sinusitis.   · Use acetaminophen or ibuprofen to control pain, unless another pain med

## 2021-06-26 NOTE — PROGRESS NOTES
CHIEF COMPLAINT:   Patient presents with:  Sinus Problem: x 1 week      HPI:   Silverio Koyanagi is a 47year old female who presents for cold symptoms for  6  days. Symptoms have progressed into sinus congestion and been worsening since onset.  Sinus con SURGICAL HISTORY  2000    Ovarian cyst      Family History   Problem Relation Age of Onset   • Hypertension Father    • Depression Father    • Other (hypothryroid) Father    • Hypertension Mother    • Anxiety Mother    • Other (hypothyroid) Mother    • Can symptoms that are consistent with:      ASSESSMENT:  Acute pansinusitis, recurrence not specified  (primary encounter diagnosis)      PLAN: Meds as below. Start in 1-2 days for worsening symptoms or no improvement.   Comfort care instructions as listed in it.  · You can use an over-the-counter decongestant, unless a similar medicine was prescribed to you. Nasal sprays work the fastest. Use one that has phenylephrine or oxymetazoline. First blow your nose gently. Then use the spray.  Don't use these medicines · Seizure  · Trouble breathing  · Feeling dizzy or faint  · Fingernails, skin, or lips look blue, purple, or gray    William last reviewed this educational content on 3/1/2020  © 5320-3107 [employer]. All rights reserved.  This information i

## 2021-07-14 ENCOUNTER — LAB ENCOUNTER (OUTPATIENT)
Dept: LAB | Age: 41
End: 2021-07-14
Attending: PHYSICIAN ASSISTANT
Payer: COMMERCIAL

## 2021-07-14 ENCOUNTER — TELEMEDICINE (OUTPATIENT)
Dept: FAMILY MEDICINE CLINIC | Facility: CLINIC | Age: 41
End: 2021-07-14

## 2021-07-14 DIAGNOSIS — E03.8 HYPOTHYROIDISM DUE TO HASHIMOTO'S THYROIDITIS: Primary | ICD-10-CM

## 2021-07-14 DIAGNOSIS — R10.33 PERIUMBILICAL ABDOMINAL PAIN: ICD-10-CM

## 2021-07-14 DIAGNOSIS — E06.3 HYPOTHYROIDISM DUE TO HASHIMOTO'S THYROIDITIS: ICD-10-CM

## 2021-07-14 DIAGNOSIS — E03.8 HYPOTHYROIDISM DUE TO HASHIMOTO'S THYROIDITIS: ICD-10-CM

## 2021-07-14 DIAGNOSIS — R19.7 DIARRHEA, UNSPECIFIED TYPE: ICD-10-CM

## 2021-07-14 DIAGNOSIS — E06.3 HYPOTHYROIDISM DUE TO HASHIMOTO'S THYROIDITIS: Primary | ICD-10-CM

## 2021-07-14 LAB
ALBUMIN SERPL-MCNC: 3.8 G/DL (ref 3.4–5)
ALBUMIN/GLOB SERPL: 1.1 {RATIO} (ref 1–2)
ALP LIVER SERPL-CCNC: 100 U/L
ALT SERPL-CCNC: 47 U/L
ANION GAP SERPL CALC-SCNC: 5 MMOL/L (ref 0–18)
AST SERPL-CCNC: 25 U/L (ref 15–37)
BASOPHILS # BLD AUTO: 0 X10(3) UL (ref 0–0.2)
BASOPHILS NFR BLD AUTO: 0 %
BILIRUB SERPL-MCNC: 0.4 MG/DL (ref 0.1–2)
BUN BLD-MCNC: 8 MG/DL (ref 7–18)
BUN/CREAT SERPL: 9.9 (ref 10–20)
CALCIUM BLD-MCNC: 9 MG/DL (ref 8.5–10.1)
CHLORIDE SERPL-SCNC: 107 MMOL/L (ref 98–112)
CO2 SERPL-SCNC: 26 MMOL/L (ref 21–32)
CREAT BLD-MCNC: 0.81 MG/DL
DEPRECATED RDW RBC AUTO: 42.9 FL (ref 35.1–46.3)
EOSINOPHIL # BLD AUTO: 0 X10(3) UL (ref 0–0.7)
EOSINOPHIL NFR BLD AUTO: 0 %
ERYTHROCYTE [DISTWIDTH] IN BLOOD BY AUTOMATED COUNT: 13.4 % (ref 11–15)
GLOBULIN PLAS-MCNC: 3.6 G/DL (ref 2.8–4.4)
GLUCOSE BLD-MCNC: 86 MG/DL (ref 70–99)
HCT VFR BLD AUTO: 44.4 %
HGB BLD-MCNC: 14.3 G/DL
IMM GRANULOCYTES # BLD AUTO: 0.03 X10(3) UL (ref 0–1)
IMM GRANULOCYTES NFR BLD: 0.4 %
LYMPHOCYTES # BLD AUTO: 2.22 X10(3) UL (ref 1–4)
LYMPHOCYTES NFR BLD AUTO: 28.5 %
M PROTEIN MFR SERPL ELPH: 7.4 G/DL (ref 6.4–8.2)
MCH RBC QN AUTO: 28.4 PG (ref 26–34)
MCHC RBC AUTO-ENTMCNC: 32.2 G/DL (ref 31–37)
MCV RBC AUTO: 88.1 FL
MONOCYTES # BLD AUTO: 0.68 X10(3) UL (ref 0.1–1)
MONOCYTES NFR BLD AUTO: 8.7 %
NEUTROPHILS # BLD AUTO: 4.86 X10 (3) UL (ref 1.5–7.7)
NEUTROPHILS # BLD AUTO: 4.86 X10(3) UL (ref 1.5–7.7)
NEUTROPHILS NFR BLD AUTO: 62.4 %
OSMOLALITY SERPL CALC.SUM OF ELEC: 284 MOSM/KG (ref 275–295)
PATIENT FASTING Y/N/NP: NO
PLATELET # BLD AUTO: 287 10(3)UL (ref 150–450)
POTASSIUM SERPL-SCNC: 4.4 MMOL/L (ref 3.5–5.1)
RBC # BLD AUTO: 5.04 X10(6)UL
SODIUM SERPL-SCNC: 138 MMOL/L (ref 136–145)
T3FREE SERPL-MCNC: 2.61 PG/ML (ref 2.4–4.2)
T4 FREE SERPL-MCNC: 1.2 NG/DL (ref 0.8–1.7)
THYROGLOB SERPL-MCNC: <15 U/ML (ref ?–60)
THYROPEROXIDASE AB SERPL-ACNC: 569 U/ML (ref ?–60)
TSI SER-ACNC: 1.95 MIU/ML (ref 0.36–3.74)
WBC # BLD AUTO: 7.8 X10(3) UL (ref 4–11)

## 2021-07-14 PROCEDURE — 84481 FREE ASSAY (FT-3): CPT

## 2021-07-14 PROCEDURE — 84439 ASSAY OF FREE THYROXINE: CPT

## 2021-07-14 PROCEDURE — 85025 COMPLETE CBC W/AUTO DIFF WBC: CPT

## 2021-07-14 PROCEDURE — 84443 ASSAY THYROID STIM HORMONE: CPT

## 2021-07-14 PROCEDURE — 99213 OFFICE O/P EST LOW 20 MIN: CPT | Performed by: PHYSICIAN ASSISTANT

## 2021-07-14 PROCEDURE — 36415 COLL VENOUS BLD VENIPUNCTURE: CPT

## 2021-07-14 PROCEDURE — 86376 MICROSOMAL ANTIBODY EACH: CPT

## 2021-07-14 PROCEDURE — 86800 THYROGLOBULIN ANTIBODY: CPT

## 2021-07-14 PROCEDURE — 80053 COMPREHEN METABOLIC PANEL: CPT

## 2021-07-14 NOTE — PROGRESS NOTES
Video Visit    Richard Gaytan is a 47year old female. No chief complaint on file. HPI:   Patient presents today complaining of digestion issues for the last 10 days.   She has had diarrhea 3-4 times per day, periumbilical abdominal pain, crampin by mouth daily.  90 tablet 1      Past Medical History:   Diagnosis Date   • Allergic rhinitis    • Depression    • Disorder of thyroid    • High blood pressure    • Hx of migraines    • Hypothyroidism 2004   • Migraines    • Pre-eclampsia    • Unspecified normal mobility and turgor. No rashes or suspicious lesions. PSYCH: Normal mood and affect, A&Ox3. ASSESSMENT AND PLAN:   1. Hypothyroidism due to Hashimoto's thyroiditis  Recheck TFTs as well as antibodies and adjust dose of medication if needed. platform. The patient was made aware of where to find [facility] notice of privacy practices, telehealth consent form and other related consent forms and documents. which are located on the [facility] website.  The patient verbally agreed to telehealth consent form,

## 2021-07-15 ENCOUNTER — LAB ENCOUNTER (OUTPATIENT)
Dept: LAB | Age: 41
End: 2021-07-15
Attending: PHYSICIAN ASSISTANT
Payer: COMMERCIAL

## 2021-07-15 DIAGNOSIS — R19.7 DIARRHEA, UNSPECIFIED TYPE: ICD-10-CM

## 2021-07-15 DIAGNOSIS — R10.33 PERIUMBILICAL ABDOMINAL PAIN: ICD-10-CM

## 2021-07-15 PROCEDURE — 87045 FECES CULTURE AEROBIC BACT: CPT

## 2021-07-15 PROCEDURE — 87427 SHIGA-LIKE TOXIN AG IA: CPT

## 2021-07-15 PROCEDURE — 87046 STOOL CULTR AEROBIC BACT EA: CPT

## 2021-07-20 ENCOUNTER — TELEPHONE (OUTPATIENT)
Dept: FAMILY MEDICINE CLINIC | Facility: CLINIC | Age: 41
End: 2021-07-20

## 2021-08-26 DIAGNOSIS — E03.8 HYPOTHYROIDISM DUE TO HASHIMOTO'S THYROIDITIS: Chronic | ICD-10-CM

## 2021-08-26 DIAGNOSIS — E06.3 HYPOTHYROIDISM DUE TO HASHIMOTO'S THYROIDITIS: Chronic | ICD-10-CM

## 2021-08-26 RX ORDER — LEVOTHYROXINE SODIUM 175 UG/1
TABLET ORAL
Qty: 60 TABLET | Refills: 1 | Status: SHIPPED | OUTPATIENT
Start: 2021-08-26 | End: 2022-01-11

## 2021-09-01 DIAGNOSIS — I10 ESSENTIAL HYPERTENSION: ICD-10-CM

## 2021-09-01 DIAGNOSIS — G43.109 MIGRAINE WITH AURA AND WITHOUT STATUS MIGRAINOSUS, NOT INTRACTABLE: ICD-10-CM

## 2021-09-02 DIAGNOSIS — I10 ESSENTIAL HYPERTENSION: ICD-10-CM

## 2021-09-02 DIAGNOSIS — G43.109 MIGRAINE WITH AURA AND WITHOUT STATUS MIGRAINOSUS, NOT INTRACTABLE: ICD-10-CM

## 2021-09-02 RX ORDER — METOPROLOL SUCCINATE 100 MG/1
100 TABLET, EXTENDED RELEASE ORAL DAILY
Qty: 30 TABLET | Refills: 0 | Status: SHIPPED | OUTPATIENT
Start: 2021-09-02 | End: 2021-09-20

## 2021-09-02 NOTE — TELEPHONE ENCOUNTER
Rx Request  Metoprolol Succinate  MG Oral Tablet 24 Hr    Disp:      90              R: 1    Associated Dx: Essential hypertension, Migraine with aura and without status migrainosus, not intractable    Last Refilled: 02/25/2021    Last Visit: 07/14/2

## 2021-09-03 RX ORDER — METOPROLOL SUCCINATE 100 MG/1
100 TABLET, EXTENDED RELEASE ORAL DAILY
Qty: 30 TABLET | Refills: 0 | OUTPATIENT
Start: 2021-09-03

## 2021-09-20 ENCOUNTER — OFFICE VISIT (OUTPATIENT)
Dept: FAMILY MEDICINE CLINIC | Facility: CLINIC | Age: 41
End: 2021-09-20

## 2021-09-20 VITALS
DIASTOLIC BLOOD PRESSURE: 74 MMHG | RESPIRATION RATE: 18 BRPM | TEMPERATURE: 99 F | OXYGEN SATURATION: 99 % | SYSTOLIC BLOOD PRESSURE: 128 MMHG | BODY MASS INDEX: 39 KG/M2 | HEART RATE: 78 BPM | HEIGHT: 67 IN

## 2021-09-20 DIAGNOSIS — Z23 NEED FOR VACCINATION: Primary | ICD-10-CM

## 2021-09-20 DIAGNOSIS — I10 ESSENTIAL HYPERTENSION: ICD-10-CM

## 2021-09-20 PROCEDURE — 90471 IMMUNIZATION ADMIN: CPT | Performed by: PHYSICIAN ASSISTANT

## 2021-09-20 PROCEDURE — 3078F DIAST BP <80 MM HG: CPT | Performed by: PHYSICIAN ASSISTANT

## 2021-09-20 PROCEDURE — 3074F SYST BP LT 130 MM HG: CPT | Performed by: PHYSICIAN ASSISTANT

## 2021-09-20 PROCEDURE — 90686 IIV4 VACC NO PRSV 0.5 ML IM: CPT | Performed by: PHYSICIAN ASSISTANT

## 2021-09-20 PROCEDURE — 99213 OFFICE O/P EST LOW 20 MIN: CPT | Performed by: PHYSICIAN ASSISTANT

## 2021-09-20 RX ORDER — METOPROLOL SUCCINATE 100 MG/1
100 TABLET, EXTENDED RELEASE ORAL DAILY
Qty: 90 TABLET | Refills: 1 | Status: SHIPPED | OUTPATIENT
Start: 2021-09-20

## 2021-09-20 NOTE — PROGRESS NOTES
Subjective:   Patient ID: Eloy Fu is a 47year old female. HPI   Patient presents today for follow-up of hypertension. She is currently taking metoprolol succinate 100 mg once daily and tolerating well.   She has not been checking her blood Cardiovascular:      Rate and Rhythm: Normal rate and regular rhythm. Heart sounds: Normal heart sounds. Pulmonary:      Effort: Pulmonary effort is normal.      Breath sounds: Normal breath sounds. No wheezing or rales.    Musculoskeletal:      Ce

## 2022-01-31 ENCOUNTER — TELEMEDICINE (OUTPATIENT)
Dept: FAMILY MEDICINE CLINIC | Facility: CLINIC | Age: 42
End: 2022-01-31
Payer: COMMERCIAL

## 2022-01-31 DIAGNOSIS — R10.2 ACUTE PAIN IN FEMALE PELVIS: ICD-10-CM

## 2022-01-31 DIAGNOSIS — R10.9 LEFT LATERAL ABDOMINAL PAIN: Primary | ICD-10-CM

## 2022-01-31 PROCEDURE — 99214 OFFICE O/P EST MOD 30 MIN: CPT | Performed by: PHYSICIAN ASSISTANT

## 2022-01-31 NOTE — PROGRESS NOTES
Video Visit    Romy Nolasco is a 46year old female. No chief complaint on file. HPI:   Patient presents with concerns of left lower abdominal and pelvic pain that has been ongoing for the last 3 or 4 days.   Started in the middle of Friday i procedures  2021    Suturing of Vaginal Cuff   •      • Hysterectomy  2021    Dr. Megan Ny total laparoscopic hysterectomy, bilateral salpingectomy, postop cuff bleeding   • Other surgical history      Ovarian cyst      Social Histor She should contact the office sooner if symptoms worsen or if anything new develops. - US ABDOMEN COMPLETE (CPT=76700); Future  - US PELVIS W EV (FIB=[id number]); Future        The patient indicates understanding of these issues and agrees to the plan.

## 2022-02-04 ENCOUNTER — HOSPITAL ENCOUNTER (OUTPATIENT)
Dept: ULTRASOUND IMAGING | Age: 42
Discharge: HOME | End: 2022-02-04
Attending: PHYSICIAN ASSISTANT
Payer: COMMERCIAL

## 2022-02-04 DIAGNOSIS — R10.9 LEFT LATERAL ABDOMINAL PAIN: ICD-10-CM

## 2022-02-04 DIAGNOSIS — R10.2 ACUTE PAIN IN FEMALE PELVIS: ICD-10-CM

## 2022-02-04 PROCEDURE — 76830 TRANSVAGINAL US NON-OB: CPT | Performed by: PHYSICIAN ASSISTANT

## 2022-02-04 PROCEDURE — 76856 US EXAM PELVIC COMPLETE: CPT | Performed by: PHYSICIAN ASSISTANT

## 2022-02-04 PROCEDURE — 76700 US EXAM ABDOM COMPLETE: CPT | Performed by: PHYSICIAN ASSISTANT

## 2022-02-23 ENCOUNTER — HOSPITAL ENCOUNTER (OUTPATIENT)
Age: 42
Discharge: HOME | End: 2022-02-23
Payer: COMMERCIAL

## 2022-02-23 VITALS
SYSTOLIC BLOOD PRESSURE: 153 MMHG | HEART RATE: 84 BPM | DIASTOLIC BLOOD PRESSURE: 106 MMHG | HEIGHT: 67 IN | RESPIRATION RATE: 18 BRPM | BODY MASS INDEX: 39.24 KG/M2 | WEIGHT: 250 LBS | OXYGEN SATURATION: 100 % | TEMPERATURE: 97 F

## 2022-02-23 DIAGNOSIS — J01.40 ACUTE NON-RECURRENT PANSINUSITIS: Primary | ICD-10-CM

## 2022-02-23 PROCEDURE — 99213 OFFICE O/P EST LOW 20 MIN: CPT

## 2022-02-23 RX ORDER — PREDNISONE 20 MG/1
40 TABLET ORAL DAILY
Qty: 10 TABLET | Refills: 0 | Status: SHIPPED | OUTPATIENT
Start: 2022-02-23 | End: 2022-02-28

## 2022-02-23 NOTE — ED INITIAL ASSESSMENT (HPI)
Patient c/o pressure to forehead, last week she had bodyaches, chills and congestion for 3-4 weeks have since resolved,also had 2 negative at home covid tests that same week. Started taking leftover abx from last summer, has taken 7 days worth. Vaccinated with booster for covid.  Has been taking sudafed

## 2022-03-07 ENCOUNTER — TELEMEDICINE (OUTPATIENT)
Dept: FAMILY MEDICINE CLINIC | Facility: CLINIC | Age: 42
End: 2022-03-07
Payer: COMMERCIAL

## 2022-03-07 DIAGNOSIS — J01.40 SUBACUTE PANSINUSITIS: Primary | ICD-10-CM

## 2022-03-07 PROCEDURE — 99213 OFFICE O/P EST LOW 20 MIN: CPT | Performed by: PHYSICIAN ASSISTANT

## 2022-03-07 RX ORDER — CEFDINIR 300 MG/1
300 CAPSULE ORAL 2 TIMES DAILY
Qty: 20 CAPSULE | Refills: 0 | Status: SHIPPED | OUTPATIENT
Start: 2022-03-07 | End: 2022-03-17

## 2022-03-11 NOTE — Clinical Note
Dear Dr. Nael Lopez,       Thank you for referring Brit Wiseman to the [facility].   Sincerely,  MITZY Enriquez
I personally performed the service described in the documentation recorded by the scribe in my presence, and it accurately and completely records my words and actions.

## 2022-03-25 ENCOUNTER — HOSPITAL ENCOUNTER (OUTPATIENT)
Dept: CT IMAGING | Age: 42
Discharge: HOME | End: 2022-03-25
Attending: OTOLARYNGOLOGY
Payer: COMMERCIAL

## 2022-03-25 DIAGNOSIS — J32.8 OTHER CHRONIC SINUSITIS: ICD-10-CM

## 2022-03-25 PROCEDURE — 70486 CT MAXILLOFACIAL W/O DYE: CPT | Performed by: OTOLARYNGOLOGY

## 2022-04-06 ENCOUNTER — TELEMEDICINE (OUTPATIENT)
Dept: FAMILY MEDICINE CLINIC | Facility: CLINIC | Age: 42
End: 2022-04-06
Payer: COMMERCIAL

## 2022-04-06 DIAGNOSIS — E03.8 HYPOTHYROIDISM DUE TO HASHIMOTO'S THYROIDITIS: Chronic | ICD-10-CM

## 2022-04-06 DIAGNOSIS — E06.3 HYPOTHYROIDISM DUE TO HASHIMOTO'S THYROIDITIS: Chronic | ICD-10-CM

## 2022-04-06 DIAGNOSIS — I10 ESSENTIAL HYPERTENSION: ICD-10-CM

## 2022-04-06 PROCEDURE — 99214 OFFICE O/P EST MOD 30 MIN: CPT | Performed by: PHYSICIAN ASSISTANT

## 2022-04-06 RX ORDER — METOPROLOL SUCCINATE 100 MG/1
TABLET, EXTENDED RELEASE ORAL
Qty: 90 TABLET | Refills: 1 | Status: SHIPPED | OUTPATIENT
Start: 2022-04-06 | End: 2022-04-06

## 2022-04-06 RX ORDER — LEVOTHYROXINE SODIUM 175 UG/1
175 TABLET ORAL
Qty: 90 TABLET | Refills: 0 | Status: SHIPPED | OUTPATIENT
Start: 2022-04-06

## 2022-04-06 RX ORDER — METOPROLOL SUCCINATE 100 MG/1
100 TABLET, EXTENDED RELEASE ORAL DAILY
Qty: 90 TABLET | Refills: 1 | Status: SHIPPED | OUTPATIENT
Start: 2022-04-06

## 2022-08-04 ENCOUNTER — LAB ENCOUNTER (OUTPATIENT)
Dept: LAB | Age: 42
End: 2022-08-04
Attending: PHYSICIAN ASSISTANT
Payer: COMMERCIAL

## 2022-08-04 DIAGNOSIS — E06.3 HYPOTHYROIDISM DUE TO HASHIMOTO'S THYROIDITIS: Chronic | ICD-10-CM

## 2022-08-04 DIAGNOSIS — E03.8 HYPOTHYROIDISM DUE TO HASHIMOTO'S THYROIDITIS: Chronic | ICD-10-CM

## 2022-08-04 LAB — TSI SER-ACNC: 4.29 MIU/ML (ref 0.36–3.74)

## 2022-08-04 PROCEDURE — 84443 ASSAY THYROID STIM HORMONE: CPT

## 2022-08-04 PROCEDURE — 36415 COLL VENOUS BLD VENIPUNCTURE: CPT

## 2022-08-05 ENCOUNTER — OFFICE VISIT (OUTPATIENT)
Dept: FAMILY MEDICINE CLINIC | Facility: CLINIC | Age: 42
End: 2022-08-05
Payer: COMMERCIAL

## 2022-08-05 VITALS
SYSTOLIC BLOOD PRESSURE: 136 MMHG | BODY MASS INDEX: 41.91 KG/M2 | WEIGHT: 267 LBS | HEART RATE: 76 BPM | HEIGHT: 67 IN | DIASTOLIC BLOOD PRESSURE: 86 MMHG | OXYGEN SATURATION: 99 % | RESPIRATION RATE: 16 BRPM

## 2022-08-05 DIAGNOSIS — F41.1 GAD (GENERALIZED ANXIETY DISORDER): ICD-10-CM

## 2022-08-05 DIAGNOSIS — Z00.00 ROUTINE GENERAL MEDICAL EXAMINATION AT A HEALTH CARE FACILITY: Primary | ICD-10-CM

## 2022-08-05 DIAGNOSIS — Z12.31 VISIT FOR SCREENING MAMMOGRAM: ICD-10-CM

## 2022-08-05 DIAGNOSIS — I10 ESSENTIAL HYPERTENSION: ICD-10-CM

## 2022-08-05 DIAGNOSIS — E03.8 HYPOTHYROIDISM DUE TO HASHIMOTO'S THYROIDITIS: ICD-10-CM

## 2022-08-05 DIAGNOSIS — Z12.83 SKIN CANCER SCREENING: ICD-10-CM

## 2022-08-05 DIAGNOSIS — F33.2 SEVERE RECURRENT MAJOR DEPRESSION WITHOUT PSYCHOTIC FEATURES (HCC): ICD-10-CM

## 2022-08-05 DIAGNOSIS — E06.3 HYPOTHYROIDISM DUE TO HASHIMOTO'S THYROIDITIS: ICD-10-CM

## 2022-08-05 PROCEDURE — 99395 PREV VISIT EST AGE 18-39: CPT | Performed by: PHYSICIAN ASSISTANT

## 2022-08-05 PROCEDURE — 3079F DIAST BP 80-89 MM HG: CPT | Performed by: PHYSICIAN ASSISTANT

## 2022-08-05 PROCEDURE — 3008F BODY MASS INDEX DOCD: CPT | Performed by: PHYSICIAN ASSISTANT

## 2022-08-05 PROCEDURE — 3075F SYST BP GE 130 - 139MM HG: CPT | Performed by: PHYSICIAN ASSISTANT

## 2022-08-05 RX ORDER — VENLAFAXINE HYDROCHLORIDE 150 MG/1
150 CAPSULE, EXTENDED RELEASE ORAL 2 TIMES DAILY
Qty: 180 CAPSULE | Refills: 1 | Status: SHIPPED | OUTPATIENT
Start: 2022-08-05

## 2022-08-05 RX ORDER — METOPROLOL SUCCINATE 100 MG/1
100 TABLET, EXTENDED RELEASE ORAL DAILY
Qty: 90 TABLET | Refills: 3 | Status: SHIPPED | OUTPATIENT
Start: 2022-08-05

## 2022-08-05 RX ORDER — BUSPIRONE HYDROCHLORIDE 15 MG/1
15 TABLET ORAL 2 TIMES DAILY
Qty: 180 TABLET | Refills: 1 | Status: SHIPPED | OUTPATIENT
Start: 2022-08-05

## 2022-08-05 RX ORDER — ALPRAZOLAM 0.5 MG/1
0.25 TABLET ORAL 3 TIMES DAILY PRN
Qty: 45 TABLET | Refills: 5 | Status: SHIPPED | OUTPATIENT
Start: 2022-08-05

## 2022-08-05 RX ORDER — LEVOTHYROXINE SODIUM 0.2 MG/1
200 TABLET ORAL
Qty: 90 TABLET | Refills: 0 | Status: SHIPPED | OUTPATIENT
Start: 2022-08-05

## 2022-10-21 ENCOUNTER — OFFICE VISIT (OUTPATIENT)
Dept: FAMILY MEDICINE CLINIC | Facility: CLINIC | Age: 42
End: 2022-10-21
Payer: COMMERCIAL

## 2022-10-21 VITALS
OXYGEN SATURATION: 98 % | DIASTOLIC BLOOD PRESSURE: 80 MMHG | RESPIRATION RATE: 20 BRPM | HEIGHT: 67 IN | TEMPERATURE: 97 F | BODY MASS INDEX: 41.85 KG/M2 | SYSTOLIC BLOOD PRESSURE: 128 MMHG | HEART RATE: 85 BPM | WEIGHT: 266.63 LBS

## 2022-10-21 DIAGNOSIS — J01.00 ACUTE NON-RECURRENT MAXILLARY SINUSITIS: Primary | ICD-10-CM

## 2022-10-21 PROCEDURE — 3074F SYST BP LT 130 MM HG: CPT | Performed by: NURSE PRACTITIONER

## 2022-10-21 PROCEDURE — 3008F BODY MASS INDEX DOCD: CPT | Performed by: NURSE PRACTITIONER

## 2022-10-21 PROCEDURE — 99213 OFFICE O/P EST LOW 20 MIN: CPT | Performed by: NURSE PRACTITIONER

## 2022-10-21 PROCEDURE — 3079F DIAST BP 80-89 MM HG: CPT | Performed by: NURSE PRACTITIONER

## 2022-10-21 RX ORDER — AMOXICILLIN AND CLAVULANATE POTASSIUM 875; 125 MG/1; MG/1
1 TABLET, FILM COATED ORAL 2 TIMES DAILY
Qty: 20 TABLET | Refills: 0 | Status: SHIPPED | OUTPATIENT
Start: 2022-10-21 | End: 2022-10-31

## 2022-11-08 ENCOUNTER — LAB ENCOUNTER (OUTPATIENT)
Dept: LAB | Age: 42
End: 2022-11-08
Attending: PHYSICIAN ASSISTANT
Payer: COMMERCIAL

## 2022-11-08 DIAGNOSIS — E03.8 HYPOTHYROIDISM DUE TO HASHIMOTO'S THYROIDITIS: ICD-10-CM

## 2022-11-08 DIAGNOSIS — E06.3 HYPOTHYROIDISM DUE TO HASHIMOTO'S THYROIDITIS: ICD-10-CM

## 2022-11-08 DIAGNOSIS — Z00.00 ROUTINE GENERAL MEDICAL EXAMINATION AT A HEALTH CARE FACILITY: ICD-10-CM

## 2022-11-08 LAB
ALBUMIN SERPL-MCNC: 3.9 G/DL (ref 3.4–5)
ALBUMIN/GLOB SERPL: 1.2 {RATIO} (ref 1–2)
ALP LIVER SERPL-CCNC: 94 U/L
ALT SERPL-CCNC: 48 U/L
ANION GAP SERPL CALC-SCNC: 6 MMOL/L (ref 0–18)
AST SERPL-CCNC: 25 U/L (ref 15–37)
BASOPHILS # BLD AUTO: 0.01 X10(3) UL (ref 0–0.2)
BASOPHILS NFR BLD AUTO: 0.1 %
BILIRUB SERPL-MCNC: 0.3 MG/DL (ref 0.1–2)
BUN BLD-MCNC: 15 MG/DL (ref 7–18)
CALCIUM BLD-MCNC: 9 MG/DL (ref 8.5–10.1)
CHLORIDE SERPL-SCNC: 110 MMOL/L (ref 98–112)
CHOLEST SERPL-MCNC: 221 MG/DL (ref ?–200)
CO2 SERPL-SCNC: 25 MMOL/L (ref 21–32)
CREAT BLD-MCNC: 0.77 MG/DL
EOSINOPHIL # BLD AUTO: 0 X10(3) UL (ref 0–0.7)
EOSINOPHIL NFR BLD AUTO: 0 %
ERYTHROCYTE [DISTWIDTH] IN BLOOD BY AUTOMATED COUNT: 12.9 %
FASTING PATIENT LIPID ANSWER: NO
FASTING STATUS PATIENT QL REPORTED: NO
GFR SERPLBLD BASED ON 1.73 SQ M-ARVRAT: 100 ML/MIN/1.73M2 (ref 60–?)
GLOBULIN PLAS-MCNC: 3.3 G/DL (ref 2.8–4.4)
GLUCOSE BLD-MCNC: 105 MG/DL (ref 70–99)
HCT VFR BLD AUTO: 44.1 %
HDLC SERPL-MCNC: 44 MG/DL (ref 40–59)
HGB BLD-MCNC: 14.9 G/DL
IMM GRANULOCYTES # BLD AUTO: 0.02 X10(3) UL (ref 0–1)
IMM GRANULOCYTES NFR BLD: 0.3 %
LDLC SERPL CALC-MCNC: 126 MG/DL (ref ?–100)
LYMPHOCYTES # BLD AUTO: 1.65 X10(3) UL (ref 1–4)
LYMPHOCYTES NFR BLD AUTO: 23.7 %
MCH RBC QN AUTO: 30.3 PG (ref 26–34)
MCHC RBC AUTO-ENTMCNC: 33.8 G/DL (ref 31–37)
MCV RBC AUTO: 89.8 FL
MONOCYTES # BLD AUTO: 0.6 X10(3) UL (ref 0.1–1)
MONOCYTES NFR BLD AUTO: 8.6 %
NEUTROPHILS # BLD AUTO: 4.68 X10 (3) UL (ref 1.5–7.7)
NEUTROPHILS # BLD AUTO: 4.68 X10(3) UL (ref 1.5–7.7)
NEUTROPHILS NFR BLD AUTO: 67.3 %
NONHDLC SERPL-MCNC: 177 MG/DL (ref ?–130)
OSMOLALITY SERPL CALC.SUM OF ELEC: 293 MOSM/KG (ref 275–295)
PLATELET # BLD AUTO: 268 10(3)UL (ref 150–450)
POTASSIUM SERPL-SCNC: 4.5 MMOL/L (ref 3.5–5.1)
PROT SERPL-MCNC: 7.2 G/DL (ref 6.4–8.2)
RBC # BLD AUTO: 4.91 X10(6)UL
SODIUM SERPL-SCNC: 141 MMOL/L (ref 136–145)
T3FREE SERPL-MCNC: 3.21 PG/ML (ref 2.4–4.2)
T4 FREE SERPL-MCNC: 1.3 NG/DL (ref 0.8–1.7)
TRIGL SERPL-MCNC: 287 MG/DL (ref 30–149)
TSI SER-ACNC: 0.22 MIU/ML (ref 0.36–3.74)
VIT B12 SERPL-MCNC: 703 PG/ML (ref 193–986)
VIT D+METAB SERPL-MCNC: 23.9 NG/ML (ref 30–100)
VLDLC SERPL CALC-MCNC: 52 MG/DL (ref 0–30)
WBC # BLD AUTO: 7 X10(3) UL (ref 4–11)

## 2022-11-08 PROCEDURE — 82607 VITAMIN B-12: CPT

## 2022-11-08 PROCEDURE — 84443 ASSAY THYROID STIM HORMONE: CPT

## 2022-11-08 PROCEDURE — 84439 ASSAY OF FREE THYROXINE: CPT

## 2022-11-08 PROCEDURE — 82306 VITAMIN D 25 HYDROXY: CPT

## 2022-11-08 PROCEDURE — 80061 LIPID PANEL: CPT

## 2022-11-08 PROCEDURE — 80053 COMPREHEN METABOLIC PANEL: CPT

## 2022-11-08 PROCEDURE — 84481 FREE ASSAY (FT-3): CPT

## 2022-11-08 PROCEDURE — 85025 COMPLETE CBC W/AUTO DIFF WBC: CPT

## 2022-11-08 PROCEDURE — 36415 COLL VENOUS BLD VENIPUNCTURE: CPT

## 2022-11-11 DIAGNOSIS — E06.3 HYPOTHYROIDISM DUE TO HASHIMOTO'S THYROIDITIS: ICD-10-CM

## 2022-11-11 DIAGNOSIS — E03.8 HYPOTHYROIDISM DUE TO HASHIMOTO'S THYROIDITIS: ICD-10-CM

## 2022-11-11 RX ORDER — LEVOTHYROXINE SODIUM 0.2 MG/1
TABLET ORAL
Qty: 90 TABLET | Refills: 0 | Status: SHIPPED | OUTPATIENT
Start: 2022-11-11

## 2022-11-11 NOTE — TELEPHONE ENCOUNTER
LV:8/5/2022  LR:8/5/2022  Component   Ref Range & Units 11/8/22 11:22 AM    T3 Free   2.40 - 4.20 pg/mL 3.21      Component   Ref Range & Units 11/8/22 11:22 AM    Free T4   0.8 - 1.7 ng/dL 1.3

## 2023-02-02 ENCOUNTER — OFFICE VISIT (OUTPATIENT)
Dept: FAMILY MEDICINE CLINIC | Facility: CLINIC | Age: 43
End: 2023-02-02
Payer: COMMERCIAL

## 2023-02-02 VITALS
HEIGHT: 67 IN | WEIGHT: 268 LBS | HEART RATE: 85 BPM | RESPIRATION RATE: 18 BRPM | OXYGEN SATURATION: 99 % | BODY MASS INDEX: 42.06 KG/M2

## 2023-02-02 DIAGNOSIS — J01.40 ACUTE NON-RECURRENT PANSINUSITIS: Primary | ICD-10-CM

## 2023-02-02 DIAGNOSIS — R04.0 EPISTAXIS: ICD-10-CM

## 2023-02-02 PROCEDURE — 99214 OFFICE O/P EST MOD 30 MIN: CPT | Performed by: PHYSICIAN ASSISTANT

## 2023-02-02 PROCEDURE — 3008F BODY MASS INDEX DOCD: CPT | Performed by: PHYSICIAN ASSISTANT

## 2023-02-02 RX ORDER — CEFDINIR 300 MG/1
300 CAPSULE ORAL 2 TIMES DAILY
Qty: 20 CAPSULE | Refills: 0 | Status: SHIPPED | OUTPATIENT
Start: 2023-02-02

## 2023-02-05 ENCOUNTER — PATIENT MESSAGE (OUTPATIENT)
Dept: FAMILY MEDICINE CLINIC | Facility: CLINIC | Age: 43
End: 2023-02-05

## 2023-02-05 DIAGNOSIS — J01.41 RECURRENT PANSINUSITIS: Primary | ICD-10-CM

## 2023-02-05 DIAGNOSIS — R04.0 BLEEDING FROM THE NOSE: ICD-10-CM

## 2023-02-06 NOTE — TELEPHONE ENCOUNTER
From: Araceli Vieyra  To: Radha Vallecillo PA-C  Sent: 2/5/2023 8:50 PM CST  Subject: Follow up. Ian Gage. Thank you for seeing me last week. The nose bleeds improved but returned again today. I still have a lot of sinus pressure and fatigue. The inside of my nose is better but still painful in the cold outside air. Also still having a lot of dry, painful nasal drainage. Can I have a referral to see Dr. Arleth Herrera? I had a similar illness last winter (though not as bad as this honestly) and he helped a lot. Thank you.

## 2023-03-16 DIAGNOSIS — F41.1 GAD (GENERALIZED ANXIETY DISORDER): ICD-10-CM

## 2023-03-16 DIAGNOSIS — F33.2 SEVERE RECURRENT MAJOR DEPRESSION WITHOUT PSYCHOTIC FEATURES (HCC): ICD-10-CM

## 2023-03-18 RX ORDER — VENLAFAXINE HYDROCHLORIDE 150 MG/1
150 CAPSULE, EXTENDED RELEASE ORAL 2 TIMES DAILY
Qty: 180 CAPSULE | Refills: 1 | Status: SHIPPED | OUTPATIENT
Start: 2023-03-18

## 2023-03-18 RX ORDER — BUSPIRONE HYDROCHLORIDE 15 MG/1
15 TABLET ORAL 2 TIMES DAILY
Qty: 180 TABLET | Refills: 1 | Status: SHIPPED | OUTPATIENT
Start: 2023-03-18

## 2023-04-24 ENCOUNTER — APPOINTMENT (OUTPATIENT)
Dept: GENERAL RADIOLOGY | Age: 43
End: 2023-04-24
Attending: EMERGENCY MEDICINE
Payer: COMMERCIAL

## 2023-04-24 ENCOUNTER — HOSPITAL ENCOUNTER (INPATIENT)
Facility: HOSPITAL | Age: 43
LOS: 4 days | Discharge: HOME | End: 2023-04-28
Attending: EMERGENCY MEDICINE | Admitting: INTERNAL MEDICINE
Payer: COMMERCIAL

## 2023-04-24 DIAGNOSIS — I21.4 NON-STEMI (NON-ST ELEVATED MYOCARDIAL INFARCTION) (HCC): Primary | ICD-10-CM

## 2023-04-24 PROBLEM — I10 BENIGN ESSENTIAL HTN: Status: ACTIVE | Noted: 2020-07-10

## 2023-04-24 LAB
ALBUMIN SERPL-MCNC: 4.1 G/DL (ref 3.4–5)
ALBUMIN/GLOB SERPL: 1.1 {RATIO} (ref 1–2)
ALP LIVER SERPL-CCNC: 91 U/L
ALT SERPL-CCNC: 51 U/L
ANION GAP SERPL CALC-SCNC: 4 MMOL/L (ref 0–18)
APTT PPP: 29.2 SECONDS (ref 23.3–35.6)
APTT PPP: 46.6 SECONDS (ref 23.3–35.6)
AST SERPL-CCNC: 41 U/L (ref 15–37)
ATRIAL RATE: 81 BPM
BASOPHILS # BLD AUTO: 0.01 X10(3) UL (ref 0–0.2)
BASOPHILS NFR BLD AUTO: 0.1 %
BILIRUB SERPL-MCNC: 0.5 MG/DL (ref 0.1–2)
BUN BLD-MCNC: 14 MG/DL (ref 7–18)
CALCIUM BLD-MCNC: 9.4 MG/DL (ref 8.5–10.1)
CHLORIDE SERPL-SCNC: 105 MMOL/L (ref 98–112)
CHOLEST SERPL-MCNC: 223 MG/DL (ref ?–200)
CO2 SERPL-SCNC: 27 MMOL/L (ref 21–32)
CREAT BLD-MCNC: 1.11 MG/DL
EOSINOPHIL # BLD AUTO: 0.01 X10(3) UL (ref 0–0.7)
EOSINOPHIL NFR BLD AUTO: 0.1 %
ERYTHROCYTE [DISTWIDTH] IN BLOOD BY AUTOMATED COUNT: 13.7 %
ERYTHROCYTE [DISTWIDTH] IN BLOOD BY AUTOMATED COUNT: 13.8 %
GFR SERPLBLD BASED ON 1.73 SQ M-ARVRAT: 64 ML/MIN/1.73M2 (ref 60–?)
GLOBULIN PLAS-MCNC: 3.7 G/DL (ref 2.8–4.4)
GLUCOSE BLD-MCNC: 107 MG/DL (ref 70–99)
HCT VFR BLD AUTO: 43.6 %
HCT VFR BLD AUTO: 44.9 %
HDLC SERPL-MCNC: 49 MG/DL (ref 40–59)
HGB BLD-MCNC: 14.4 G/DL
HGB BLD-MCNC: 14.9 G/DL
IMM GRANULOCYTES # BLD AUTO: 0.03 X10(3) UL (ref 0–1)
IMM GRANULOCYTES NFR BLD: 0.3 %
LDLC SERPL CALC-MCNC: 141 MG/DL (ref ?–100)
LYMPHOCYTES # BLD AUTO: 2.33 X10(3) UL (ref 1–4)
LYMPHOCYTES NFR BLD AUTO: 22.7 %
MCH RBC QN AUTO: 29.7 PG (ref 26–34)
MCH RBC QN AUTO: 30.6 PG (ref 26–34)
MCHC RBC AUTO-ENTMCNC: 33 G/DL (ref 31–37)
MCHC RBC AUTO-ENTMCNC: 33.2 G/DL (ref 31–37)
MCV RBC AUTO: 89.6 FL
MCV RBC AUTO: 92.6 FL
MONOCYTES # BLD AUTO: 0.96 X10(3) UL (ref 0.1–1)
MONOCYTES NFR BLD AUTO: 9.3 %
NEUTROPHILS # BLD AUTO: 6.94 X10 (3) UL (ref 1.5–7.7)
NEUTROPHILS # BLD AUTO: 6.94 X10(3) UL (ref 1.5–7.7)
NEUTROPHILS NFR BLD AUTO: 67.5 %
NONHDLC SERPL-MCNC: 174 MG/DL (ref ?–130)
OSMOLALITY SERPL CALC.SUM OF ELEC: 283 MOSM/KG (ref 275–295)
P AXIS: 68 DEGREES
P-R INTERVAL: 162 MS
PLATELET # BLD AUTO: 230 10(3)UL (ref 150–450)
PLATELET # BLD AUTO: 264 10(3)UL (ref 150–450)
POTASSIUM SERPL-SCNC: 4.5 MMOL/L (ref 3.5–5.1)
PROT SERPL-MCNC: 7.8 G/DL (ref 6.4–8.2)
Q-T INTERVAL: 396 MS
QRS DURATION: 94 MS
QTC CALCULATION (BEZET): 460 MS
R AXIS: 19 DEGREES
RBC # BLD AUTO: 4.71 X10(6)UL
RBC # BLD AUTO: 5.01 X10(6)UL
SODIUM SERPL-SCNC: 136 MMOL/L (ref 136–145)
T AXIS: 33 DEGREES
TRIGL SERPL-MCNC: 184 MG/DL (ref 30–149)
TROPONIN I HIGH SENSITIVITY: 586 NG/L
VENTRICULAR RATE: 81 BPM
VLDLC SERPL CALC-MCNC: 34 MG/DL (ref 0–30)
WBC # BLD AUTO: 10.2 X10(3) UL (ref 4–11)
WBC # BLD AUTO: 10.3 X10(3) UL (ref 4–11)

## 2023-04-24 PROCEDURE — 99223 1ST HOSP IP/OBS HIGH 75: CPT | Performed by: INTERNAL MEDICINE

## 2023-04-24 PROCEDURE — 71045 X-RAY EXAM CHEST 1 VIEW: CPT | Performed by: EMERGENCY MEDICINE

## 2023-04-24 RX ORDER — BUSPIRONE HYDROCHLORIDE 15 MG/1
15 TABLET ORAL 2 TIMES DAILY
Status: DISCONTINUED | OUTPATIENT
Start: 2023-04-24 | End: 2023-04-28

## 2023-04-24 RX ORDER — ACETAMINOPHEN 500 MG
1000 TABLET ORAL ONCE
Status: COMPLETED | OUTPATIENT
Start: 2023-04-24 | End: 2023-04-24

## 2023-04-24 RX ORDER — LABETALOL HYDROCHLORIDE 5 MG/ML
10 INJECTION, SOLUTION INTRAVENOUS ONCE
Status: COMPLETED | OUTPATIENT
Start: 2023-04-24 | End: 2023-04-24

## 2023-04-24 RX ORDER — ASPIRIN 81 MG/1
324 TABLET, CHEWABLE ORAL ONCE
Status: COMPLETED | OUTPATIENT
Start: 2023-04-24 | End: 2023-04-24

## 2023-04-24 RX ORDER — ALPRAZOLAM 0.25 MG/1
0.25 TABLET ORAL 3 TIMES DAILY PRN
Status: DISCONTINUED | OUTPATIENT
Start: 2023-04-24 | End: 2023-04-28

## 2023-04-24 RX ORDER — METOPROLOL TARTRATE 5 MG/5ML
5 INJECTION INTRAVENOUS ONCE
Status: COMPLETED | OUTPATIENT
Start: 2023-04-24 | End: 2023-04-24

## 2023-04-24 RX ORDER — METOPROLOL SUCCINATE 100 MG/1
100 TABLET, EXTENDED RELEASE ORAL
Status: DISCONTINUED | OUTPATIENT
Start: 2023-04-25 | End: 2023-04-28

## 2023-04-24 RX ORDER — NITROGLYCERIN 0.4 MG/1
0.4 TABLET SUBLINGUAL ONCE
Status: COMPLETED | OUTPATIENT
Start: 2023-04-24 | End: 2023-04-24

## 2023-04-24 RX ORDER — HEPARIN SODIUM 1000 [USP'U]/ML
5000 INJECTION, SOLUTION INTRAVENOUS; SUBCUTANEOUS ONCE
Status: COMPLETED | OUTPATIENT
Start: 2023-04-24 | End: 2023-04-24

## 2023-04-24 RX ORDER — ACETAMINOPHEN 500 MG
500 TABLET ORAL EVERY 4 HOURS PRN
Status: DISCONTINUED | OUTPATIENT
Start: 2023-04-24 | End: 2023-04-28

## 2023-04-24 RX ORDER — HEPARIN SODIUM AND DEXTROSE 10000; 5 [USP'U]/100ML; G/100ML
1000 INJECTION INTRAVENOUS ONCE
Status: COMPLETED | OUTPATIENT
Start: 2023-04-24 | End: 2023-04-24

## 2023-04-24 RX ORDER — VENLAFAXINE HYDROCHLORIDE 75 MG/1
150 CAPSULE, EXTENDED RELEASE ORAL 2 TIMES DAILY
Status: DISCONTINUED | OUTPATIENT
Start: 2023-04-25 | End: 2023-04-28

## 2023-04-24 RX ORDER — LABETALOL HYDROCHLORIDE 5 MG/ML
20 INJECTION, SOLUTION INTRAVENOUS ONCE
Status: COMPLETED | OUTPATIENT
Start: 2023-04-24 | End: 2023-04-24

## 2023-04-24 RX ORDER — ASPIRIN 325 MG
325 TABLET ORAL DAILY
Status: DISCONTINUED | OUTPATIENT
Start: 2023-04-25 | End: 2023-04-25

## 2023-04-24 RX ORDER — ONDANSETRON 2 MG/ML
4 INJECTION INTRAMUSCULAR; INTRAVENOUS EVERY 6 HOURS PRN
Status: DISCONTINUED | OUTPATIENT
Start: 2023-04-24 | End: 2023-04-28

## 2023-04-24 RX ORDER — NITROGLYCERIN 0.4 MG/1
0.4 TABLET SUBLINGUAL EVERY 5 MIN PRN
Status: DISCONTINUED | OUTPATIENT
Start: 2023-04-24 | End: 2023-04-28

## 2023-04-24 RX ORDER — LEVOTHYROXINE SODIUM 0.1 MG/1
200 TABLET ORAL
Status: DISCONTINUED | OUTPATIENT
Start: 2023-04-25 | End: 2023-04-28

## 2023-04-24 RX ORDER — HEPARIN SODIUM AND DEXTROSE 10000; 5 [USP'U]/100ML; G/100ML
INJECTION INTRAVENOUS CONTINUOUS
Status: DISCONTINUED | OUTPATIENT
Start: 2023-04-25 | End: 2023-04-26

## 2023-04-24 RX ORDER — PROCHLORPERAZINE EDISYLATE 5 MG/ML
5 INJECTION INTRAMUSCULAR; INTRAVENOUS EVERY 8 HOURS PRN
Status: DISCONTINUED | OUTPATIENT
Start: 2023-04-24 | End: 2023-04-27

## 2023-04-24 NOTE — ED INITIAL ASSESSMENT (HPI)
Awoke this am with tightness in chest- lasted a few hours and then went away-then around 1530 while walking up a flight of stairs began SOB with chest pain, nausea, sweating and pain down l arm

## 2023-04-24 NOTE — ED QUICK NOTES
Rec'd patient from PED. Patient stable with no complaints at this time. Patient awaiting room to be cleaned. Patient aware of plan and agreeable.

## 2023-04-24 NOTE — ED QUICK NOTES
Orders for admission, patient is aware of plan and ready to go upstairs. Any questions, please call ED RN Maxine Guevara  at extension [phone]. Vaccinated? yes  Type of COVID test sent:none  COVID Suspicion level:low Low/High      Titratable drug(s) infusing:Heparin  Rate:1000units/hr    LOC at time of transport:  Alert and oriented  Other pertinent information:  Pt going to B8 in [facility] ED while waiting for a bed  CIWA score=na  NIH score=na

## 2023-04-25 ENCOUNTER — APPOINTMENT (OUTPATIENT)
Dept: INTERVENTIONAL RADIOLOGY/VASCULAR | Facility: HOSPITAL | Age: 43
End: 2023-04-25
Attending: INTERNAL MEDICINE
Payer: COMMERCIAL

## 2023-04-25 ENCOUNTER — APPOINTMENT (OUTPATIENT)
Dept: CV DIAGNOSTICS | Facility: HOSPITAL | Age: 43
End: 2023-04-25
Attending: INTERNAL MEDICINE
Payer: COMMERCIAL

## 2023-04-25 LAB
ANION GAP SERPL CALC-SCNC: 2 MMOL/L (ref 0–18)
APTT PPP: 45 SECONDS (ref 23.3–35.6)
APTT PPP: 52.3 SECONDS (ref 23.3–35.6)
ATRIAL RATE: 74 BPM
ATRIAL RATE: 82 BPM
BUN BLD-MCNC: 12 MG/DL (ref 7–18)
CALCIUM BLD-MCNC: 8.7 MG/DL (ref 8.5–10.1)
CHLORIDE SERPL-SCNC: 107 MMOL/L (ref 98–112)
CO2 SERPL-SCNC: 27 MMOL/L (ref 21–32)
CREAT BLD-MCNC: 0.87 MG/DL
EST. AVERAGE GLUCOSE BLD GHB EST-MCNC: 100 MG/DL (ref 68–126)
GFR SERPLBLD BASED ON 1.73 SQ M-ARVRAT: 86 ML/MIN/1.73M2 (ref 60–?)
GLUCOSE BLD-MCNC: 111 MG/DL (ref 70–99)
GLUCOSE BLD-MCNC: 95 MG/DL (ref 70–99)
HBA1C MFR BLD: 5.1 % (ref ?–5.7)
OSMOLALITY SERPL CALC.SUM OF ELEC: 282 MOSM/KG (ref 275–295)
P AXIS: 50 DEGREES
P AXIS: 64 DEGREES
P-R INTERVAL: 174 MS
P-R INTERVAL: 176 MS
PLATELET # BLD AUTO: 208 10(3)UL (ref 150–450)
POTASSIUM SERPL-SCNC: 4 MMOL/L (ref 3.5–5.1)
Q-T INTERVAL: 440 MS
Q-T INTERVAL: 470 MS
QRS DURATION: 92 MS
QRS DURATION: 94 MS
QTC CALCULATION (BEZET): 514 MS
QTC CALCULATION (BEZET): 521 MS
R AXIS: 28 DEGREES
R AXIS: 3 DEGREES
SARS-COV-2 RNA RESP QL NAA+PROBE: NOT DETECTED
SODIUM SERPL-SCNC: 136 MMOL/L (ref 136–145)
T AXIS: 126 DEGREES
T AXIS: 89 DEGREES
TROPONIN I HIGH SENSITIVITY: 1514 NG/L
TROPONIN I HIGH SENSITIVITY: 1975 NG/L
TROPONIN I HIGH SENSITIVITY: 2067 NG/L
VENTRICULAR RATE: 74 BPM
VENTRICULAR RATE: 82 BPM

## 2023-04-25 PROCEDURE — B2111ZZ FLUOROSCOPY OF MULTIPLE CORONARY ARTERIES USING LOW OSMOLAR CONTRAST: ICD-10-PCS | Performed by: INTERNAL MEDICINE

## 2023-04-25 PROCEDURE — 4A023N7 MEASUREMENT OF CARDIAC SAMPLING AND PRESSURE, LEFT HEART, PERCUTANEOUS APPROACH: ICD-10-PCS | Performed by: INTERNAL MEDICINE

## 2023-04-25 PROCEDURE — B2151ZZ FLUOROSCOPY OF LEFT HEART USING LOW OSMOLAR CONTRAST: ICD-10-PCS | Performed by: INTERNAL MEDICINE

## 2023-04-25 PROCEDURE — 99232 SBSQ HOSP IP/OBS MODERATE 35: CPT | Performed by: STUDENT IN AN ORGANIZED HEALTH CARE EDUCATION/TRAINING PROGRAM

## 2023-04-25 PROCEDURE — 93306 TTE W/DOPPLER COMPLETE: CPT | Performed by: INTERNAL MEDICINE

## 2023-04-25 PROCEDURE — B41F1ZZ FLUOROSCOPY OF RIGHT LOWER EXTREMITY ARTERIES USING LOW OSMOLAR CONTRAST: ICD-10-PCS | Performed by: INTERNAL MEDICINE

## 2023-04-25 RX ORDER — SODIUM CHLORIDE 9 MG/ML
INJECTION, SOLUTION INTRAVENOUS
Status: ACTIVE | OUTPATIENT
Start: 2023-04-26 | End: 2023-04-26

## 2023-04-25 RX ORDER — METOPROLOL TARTRATE 5 MG/5ML
10 INJECTION INTRAVENOUS
Status: DISPENSED | OUTPATIENT
Start: 2023-04-25 | End: 2023-04-25

## 2023-04-25 RX ORDER — MORPHINE SULFATE 2 MG/ML
3 INJECTION, SOLUTION INTRAMUSCULAR; INTRAVENOUS ONCE
Status: COMPLETED | OUTPATIENT
Start: 2023-04-25 | End: 2023-04-25

## 2023-04-25 RX ORDER — NITROGLYCERIN 20 MG/100ML
INJECTION INTRAVENOUS CONTINUOUS
Status: DISCONTINUED | OUTPATIENT
Start: 2023-04-25 | End: 2023-04-25

## 2023-04-25 RX ORDER — MIDAZOLAM HYDROCHLORIDE 1 MG/ML
INJECTION INTRAMUSCULAR; INTRAVENOUS
Status: COMPLETED
Start: 2023-04-25 | End: 2023-04-25

## 2023-04-25 RX ORDER — METOPROLOL TARTRATE 5 MG/5ML
INJECTION INTRAVENOUS
Status: COMPLETED
Start: 2023-04-25 | End: 2023-04-25

## 2023-04-25 RX ORDER — SPIRONOLACTONE 25 MG/1
25 TABLET ORAL DAILY
Status: DISCONTINUED | OUTPATIENT
Start: 2023-04-25 | End: 2023-04-28

## 2023-04-25 RX ORDER — ASPIRIN 81 MG/1
81 TABLET ORAL DAILY
Status: DISCONTINUED | OUTPATIENT
Start: 2023-04-26 | End: 2023-04-28

## 2023-04-25 RX ORDER — LIDOCAINE HYDROCHLORIDE 10 MG/ML
INJECTION, SOLUTION EPIDURAL; INFILTRATION; INTRACAUDAL; PERINEURAL
Status: COMPLETED
Start: 2023-04-25 | End: 2023-04-25

## 2023-04-25 RX ORDER — AMLODIPINE BESYLATE 5 MG/1
5 TABLET ORAL DAILY
Status: DISCONTINUED | OUTPATIENT
Start: 2023-04-25 | End: 2023-04-25

## 2023-04-25 RX ORDER — ENALAPRILAT 2.5 MG/2ML
INJECTION INTRAVENOUS
Status: COMPLETED
Start: 2023-04-25 | End: 2023-04-25

## 2023-04-25 RX ORDER — ENALAPRILAT 2.5 MG/2ML
1.25 INJECTION INTRAVENOUS EVERY 6 HOURS PRN
Status: DISCONTINUED | OUTPATIENT
Start: 2023-04-25 | End: 2023-04-27

## 2023-04-25 RX ORDER — HEPARIN SODIUM 5000 [USP'U]/ML
INJECTION, SOLUTION INTRAVENOUS; SUBCUTANEOUS
Status: COMPLETED
Start: 2023-04-25 | End: 2023-04-25

## 2023-04-25 RX ORDER — HYDRALAZINE HYDROCHLORIDE 20 MG/ML
10 INJECTION INTRAMUSCULAR; INTRAVENOUS
Status: DISCONTINUED | OUTPATIENT
Start: 2023-04-25 | End: 2023-04-28

## 2023-04-25 RX ORDER — ATORVASTATIN CALCIUM 40 MG/1
40 TABLET, FILM COATED ORAL NIGHTLY
Status: DISCONTINUED | OUTPATIENT
Start: 2023-04-25 | End: 2023-04-26

## 2023-04-25 RX ORDER — MORPHINE SULFATE 4 MG/ML
4 INJECTION, SOLUTION INTRAMUSCULAR; INTRAVENOUS EVERY 4 HOURS PRN
Status: DISCONTINUED | OUTPATIENT
Start: 2023-04-25 | End: 2023-04-26

## 2023-04-25 RX ORDER — METOPROLOL TARTRATE 5 MG/5ML
5 INJECTION INTRAVENOUS EVERY 6 HOURS
Status: DISCONTINUED | OUTPATIENT
Start: 2023-04-25 | End: 2023-04-25

## 2023-04-25 RX ORDER — SODIUM CHLORIDE 9 MG/ML
INJECTION, SOLUTION INTRAVENOUS
Status: DISCONTINUED | OUTPATIENT
Start: 2023-04-26 | End: 2023-04-25 | Stop reason: HOSPADM

## 2023-04-25 NOTE — PROGRESS NOTES
39 y/o female with hx HTN post preeclampsia, strong family history presents with CP. EKG biphasic T wave changes and elevated troponin 18681. Echo this morning. Needs cath to rule out SCAD. Patients with eclapmsia are felt to have placental microvascular disease that increase long term risk of CAD. Last night uncontrolled HTN. Add ARB and wean nipride.  Severe headache with NTG

## 2023-04-25 NOTE — BH RN ADMISSION NOTE
Assumed care of pt at 2305. Pt alert and oriented x4, on room air, NSR on tele. Lungs clear, pulses +2 bilaterally, skin intact. Denies any chest pain or shortness of breath. Admission navigator completed with patient. PTA medications reviewed. Blood pressure once on the floor was 168/114.     0004: On call LEIF SY paged for elevated BP and pt becoming diaphoretic with a headache, see new orders. 1: Spoke with Dr. Darwin Lujan about patients blood pressure, see new orders. 5478: RRT called to expedite administration of Nipride after speaking to [address] APRARELI.   0114: Report given to receiving CNICU RN

## 2023-04-25 NOTE — PLAN OF CARE
-Cath lab this AM for angiogram; Heparin restarted at previous rate without bolus as ordered, see MAR  -Cardene gtt for SBP goal <130  -See MAR for pain management     Problem: Patient/Family Goals  Goal: Patient/Family Long Term Goal  Description: Patient's Long Term Goal: Disease management    Interventions:  - Follow up with physicians as directed  - Medication management   - See additional Care Plan goals for specific interventions  Outcome: Progressing  Goal: Patient/Family Short Term Goal  Description: Patient's Short Term Goal: Hospital discharge    Interventions:   - Medication management  - Labs/tests as ordered  -participate in PT/OT/ST as ordered  - See additional Care Plan goals for specific interventions  Outcome: Progressing     Problem: CARDIOVASCULAR - ADULT  Goal: Maintains optimal cardiac output and hemodynamic stability  Description: INTERVENTIONS:  - Monitor vital signs, rhythm, and trends  - Monitor for bleeding, hypotension and signs of decreased cardiac output  - Evaluate effectiveness of vasoactive medications to optimize hemodynamic stability  - Monitor arterial and/or venous puncture sites for bleeding and/or hematoma  - Assess quality of pulses, skin color and temperature  - Assess for signs of decreased coronary artery perfusion - ex.  Angina  - Evaluate fluid balance, assess for edema, trend weights  Outcome: Progressing  Goal: Absence of cardiac arrhythmias or at baseline  Description: INTERVENTIONS:  - Continuous cardiac monitoring, monitor vital signs, obtain 12 lead EKG if indicated  - Evaluate effectiveness of antiarrhythmic and heart rate control medications as ordered  - Initiate emergency measures for life threatening arrhythmias  - Monitor electrolytes and administer replacement therapy as ordered  Outcome: Progressing     Problem: PAIN - ADULT  Goal: Verbalizes/displays adequate comfort level or patient's stated pain goal  Description: INTERVENTIONS:  - Encourage pt to monitor pain and request assistance  - Assess pain using appropriate pain scale  - Administer analgesics based on type and severity of pain and evaluate response  - Implement non-pharmacological measures as appropriate and evaluate response  - Consider cultural and social influences on pain and pain management  - Manage/alleviate anxiety  - Utilize distraction and/or relaxation techniques  - Monitor for opioid side effects  - Notify MD/LIP if interventions unsuccessful or patient reports new pain  - Anticipate increased pain with activity and pre-medicate as appropriate  Outcome: Progressing

## 2023-04-25 NOTE — PLAN OF CARE
Real Girt Patient Status:  Inpatient    1980 MRN [id number]   [facility] 2NE-A Attending Sravan Acharya MD   Hosp Day # 1 PCP Jeneen Landau, DO     Cardiology Nocturnal APN Plan of Care     Page Received: Bedside RN 9224    Cardiology consulted on day shift for patient c/o CP with persistent HTN. Patient received multiple IVP metoprolol and labetolol with minimal effect. At time of page, patient c/o HA and is diaphoretic. Troponin 586 -> 2067. EKG with new TWI in V leads. HTN /110. Reviewed available labs, patient normally hypothyroid but TSH currently 0.223.   Patient states she has not missed any of her psych meds and is not in withdrawal.       Assessment/Plan:   - Patient started on heparin gtt  - Attending physician Dr. Ruben Braxton aware and communicated with floor RN   - Lopressor 10 mg IV x 2    - 1.25 mg IV vasotec   - 4 mg IV MS   - Stop ntg gtt and start nipride gtt  - Patient transfer to ICU for nipride gtt, unable to administer on the floor   - Troponin in AM   - Echo  - NPO  - Primary reordered home metoprolol 100 mg XL po daily and home [address], [address]  2023  12:56 AM

## 2023-04-25 NOTE — PLAN OF CARE
Report received from [facility] on CTU 2. Patient received at 120 with nipride and heparin gtt running. Morphine and metoprolol boluses given per Dr Carlito Nunez. Plan for angiogram in AM so patient kept NPO for rest of night. Nipride titrated to maintain SBP <160. Spoke with Dr Navi Altman this AM, updated on patient's condition, ensured orders placed for echo and repeat trop in AM. Spoke with pt's  per her request, updated him on plan of care and answered questions.

## 2023-04-25 NOTE — PROGRESS NOTES
LV apical HK EF 45%  LAD partially intramyocardial 90%  Huge ramus 95%  RCA mild disease    Discuss with cV

## 2023-04-26 ENCOUNTER — APPOINTMENT (OUTPATIENT)
Dept: INTERVENTIONAL RADIOLOGY/VASCULAR | Facility: HOSPITAL | Age: 43
End: 2023-04-26
Attending: NURSE PRACTITIONER
Payer: COMMERCIAL

## 2023-04-26 LAB
APTT PPP: 120 SECONDS (ref 23.3–35.6)
APTT PPP: 44.1 SECONDS (ref 23.3–35.6)
APTT PPP: 68.1 SECONDS (ref 23.3–35.6)
ATRIAL RATE: 80 BPM
P AXIS: 64 DEGREES
P-R INTERVAL: 168 MS
PLATELET # BLD AUTO: 268 10(3)UL (ref 150–450)
Q-T INTERVAL: 458 MS
QRS DURATION: 94 MS
QTC CALCULATION (BEZET): 528 MS
R AXIS: 15 DEGREES
T AXIS: -38 DEGREES
VENTRICULAR RATE: 80 BPM

## 2023-04-26 PROCEDURE — B2111ZZ FLUOROSCOPY OF MULTIPLE CORONARY ARTERIES USING LOW OSMOLAR CONTRAST: ICD-10-PCS | Performed by: INTERNAL MEDICINE

## 2023-04-26 PROCEDURE — B241ZZ3 ULTRASONOGRAPHY OF MULTIPLE CORONARY ARTERIES, INTRAVASCULAR: ICD-10-PCS | Performed by: INTERNAL MEDICINE

## 2023-04-26 PROCEDURE — 99232 SBSQ HOSP IP/OBS MODERATE 35: CPT | Performed by: STUDENT IN AN ORGANIZED HEALTH CARE EDUCATION/TRAINING PROGRAM

## 2023-04-26 PROCEDURE — 027137Z DILATION OF CORONARY ARTERY, TWO ARTERIES WITH FOUR OR MORE DRUG-ELUTING INTRALUMINAL DEVICES, PERCUTANEOUS APPROACH: ICD-10-PCS | Performed by: INTERNAL MEDICINE

## 2023-04-26 RX ORDER — PROTAMINE SULFATE 10 MG/ML
INJECTION, SOLUTION INTRAVENOUS
Status: COMPLETED
Start: 2023-04-26 | End: 2023-04-26

## 2023-04-26 RX ORDER — CLOPIDOGREL BISULFATE 75 MG/1
75 TABLET ORAL DAILY
Status: DISCONTINUED | OUTPATIENT
Start: 2023-04-27 | End: 2023-04-28

## 2023-04-26 RX ORDER — ROSUVASTATIN CALCIUM 20 MG/1
20 TABLET, COATED ORAL NIGHTLY
Status: DISCONTINUED | OUTPATIENT
Start: 2023-04-26 | End: 2023-04-28

## 2023-04-26 RX ORDER — ENALAPRILAT 2.5 MG/2ML
INJECTION INTRAVENOUS
Status: COMPLETED
Start: 2023-04-26 | End: 2023-04-26

## 2023-04-26 RX ORDER — MIDAZOLAM HYDROCHLORIDE 1 MG/ML
INJECTION INTRAMUSCULAR; INTRAVENOUS
Status: COMPLETED
Start: 2023-04-26 | End: 2023-04-26

## 2023-04-26 RX ORDER — HEPARIN SODIUM AND DEXTROSE 10000; 5 [USP'U]/100ML; G/100ML
1000 INJECTION INTRAVENOUS ONCE
Status: COMPLETED | OUTPATIENT
Start: 2023-04-26 | End: 2023-04-26

## 2023-04-26 RX ORDER — HEPARIN SODIUM AND DEXTROSE 10000; 5 [USP'U]/100ML; G/100ML
INJECTION INTRAVENOUS CONTINUOUS
Status: DISCONTINUED | OUTPATIENT
Start: 2023-04-26 | End: 2023-04-27

## 2023-04-26 RX ORDER — CLOPIDOGREL BISULFATE 75 MG/1
600 TABLET ORAL ONCE
Status: COMPLETED | OUTPATIENT
Start: 2023-04-26 | End: 2023-04-26

## 2023-04-26 RX ORDER — CLOPIDOGREL BISULFATE 75 MG/1
75 TABLET ORAL DAILY
Status: CANCELLED | OUTPATIENT
Start: 2023-04-27

## 2023-04-26 RX ORDER — METOPROLOL TARTRATE 5 MG/5ML
INJECTION INTRAVENOUS
Status: COMPLETED
Start: 2023-04-26 | End: 2023-04-26

## 2023-04-26 RX ORDER — HEPARIN SODIUM 5000 [USP'U]/ML
INJECTION, SOLUTION INTRAVENOUS; SUBCUTANEOUS
Status: COMPLETED
Start: 2023-04-26 | End: 2023-04-26

## 2023-04-26 RX ORDER — ONDANSETRON 2 MG/ML
INJECTION INTRAMUSCULAR; INTRAVENOUS
Status: COMPLETED
Start: 2023-04-26 | End: 2023-04-26

## 2023-04-26 RX ORDER — CLOPIDOGREL BISULFATE 75 MG/1
TABLET ORAL
Status: COMPLETED
Start: 2023-04-26 | End: 2023-04-26

## 2023-04-26 RX ORDER — MORPHINE SULFATE 4 MG/ML
4 INJECTION, SOLUTION INTRAMUSCULAR; INTRAVENOUS EVERY 2 HOUR PRN
Status: DISCONTINUED | OUTPATIENT
Start: 2023-04-26 | End: 2023-04-28

## 2023-04-26 RX ORDER — LIDOCAINE HYDROCHLORIDE 10 MG/ML
INJECTION, SOLUTION EPIDURAL; INFILTRATION; INTRACAUDAL; PERINEURAL
Status: COMPLETED
Start: 2023-04-26 | End: 2023-04-26

## 2023-04-26 NOTE — PLAN OF CARE
Pt a&o x4. On RA. Nicardipine weaned off at 2 am with SBP remaining <130. Rt groin c/d/I with no hematoma. +pp. Denied cp during shift. Nausea x 1 - compazine given with good relief. Voiding without difficulty. No HA. Heparin gtt infusing per ACS protocol. NPO since midnight except for am labs. Plan for cath lab today.  No family present overnight

## 2023-04-26 NOTE — DISCHARGE INSTRUCTIONS
Resources to follow up with a Psychiatrist and [address] [place] [address] Blvd and Counseling- [phone]  Call and ask what locations have providers for Psychiatrist and Psychotherapist.    [facility]  Address: [address], [place], [address]  Phone: [phone]    [facility]  Address: [address] [place] [address] [address], [address]  Phone: [phone]    [facility]  Address: [address] [place] [address] [address], [place], [address]  Phone: [phone]  [address], Giuliana Hernandez,   Phone: [phone]

## 2023-04-26 NOTE — PROGRESS NOTES
LFA  3.75 EBU  LAD 90% predil with 2.5  IVUS severe amount of soft plaque  3.0 38 and 3.5 x 20 stent post dil with 3.5 NC and 4.0 NC  Ramus90% with severe amount of plaque.    3.0 x 38 and 3.0 x 12 stent post dil with 3.5 NC    Needs Crestor 20 and if LDL does not go below 79 then Emily Rodriguez

## 2023-04-26 NOTE — PROGRESS NOTES
23 1031   Over the last 2 weeks, how often have you been bothered by any of the following problems? Little interest or pleasure in doing things 0   Feeling down, depressed, or hopeless 1   Trouble falling or staying asleep, or sleeping too much 2   Feeling tired or having little energy 2   Poor appetite or overeating 2   Feeling bad about yourself - or that you are a failure or have let yourself or your family down 0   Trouble concentrating on things, such as reading the newspaper or watching television 0   Moving or speaking so slowly that other people could have noticed. Or the opposite - being so fidgety or restless that you have been moving around a lot more than usual 0   If you checked off any problems, how difficult have these problems made it for you to do your work, take care of things at home, or get along with other people? Somewhat difficult     [address] [facility] Resource Referral Counselor Note    Burnette Sicard Patient Status:  Inpatient    1980 MRN [id number]   [facility] 6NE-A Attending Samson Valenzuela [facility] 91 Day # 2 PCP Millie Grady DO       S(subjective) The patient admits to a history of anxiety and depression. She states she is on medication per PCP. She use to see a psychiatrist but states it was too hard to get into see her and it was very stressful. She said, she is willing to take referrals to follow up with a psychiatrist and psychotherapist.  She denies any suicidal thoughts. O(objective) The patient is alert and oriented x4. Her mood and affect is wnl. A(assessment) The phq9 was completed. P(plan) Referrals will be placed in the discharge summary for the patient.       Everardo Muro RN  2023  10:32 AM

## 2023-04-26 NOTE — PLAN OF CARE
C/o 8/10 chest pain with diaphoresis and sob. Tearful. Headache with slnitro. Ekgwith slight ST depression to v3-4, continued TWI. No JOSHUA. Reviewed with Dr. Darwin Lujan, large dilation introprocedure. Will manage pain control. Resume heparin gtt 2 hours post procedure.

## 2023-04-26 NOTE — PLAN OF CARE
Cath lab today for PCI. Returned ~1500. L femoral site C/D/I, no hematoma. Upon return, Pt c/o mid-sternal CP and difficulty getting a deep breath. EKG done, SL NTG x2 and morphine given. APRN notified. Heparin gtt re-started 2 hours after return to unit. C/O intermittent nausea, given PRN meds. Lying flat until 1900.  @ bedside. Will continue to monitor.

## 2023-04-27 ENCOUNTER — APPOINTMENT (OUTPATIENT)
Dept: CV DIAGNOSTICS | Facility: HOSPITAL | Age: 43
End: 2023-04-27
Attending: INTERNAL MEDICINE
Payer: COMMERCIAL

## 2023-04-27 LAB
APTT PPP: 56 SECONDS (ref 23.3–35.6)
ERYTHROCYTE [DISTWIDTH] IN BLOOD BY AUTOMATED COUNT: 14.2 %
HCT VFR BLD AUTO: 46.6 %
HGB BLD-MCNC: 16 G/DL
MCH RBC QN AUTO: 31.1 PG (ref 26–34)
MCHC RBC AUTO-ENTMCNC: 34.3 G/DL (ref 31–37)
MCV RBC AUTO: 90.5 FL
PLATELET # BLD AUTO: 319 10(3)UL (ref 150–450)
PLATELET # BLD AUTO: 319 10(3)UL (ref 150–450)
RBC # BLD AUTO: 5.15 X10(6)UL
WBC # BLD AUTO: 11.8 X10(3) UL (ref 4–11)

## 2023-04-27 PROCEDURE — 99232 SBSQ HOSP IP/OBS MODERATE 35: CPT | Performed by: STUDENT IN AN ORGANIZED HEALTH CARE EDUCATION/TRAINING PROGRAM

## 2023-04-27 PROCEDURE — 93308 TTE F-UP OR LMTD: CPT | Performed by: INTERNAL MEDICINE

## 2023-04-27 NOTE — CARDIAC REHAB
Cardiac rehab education completed with patient and family  Stent card given to patient  Patient referred to cardiac rehab  Appointment made

## 2023-04-27 NOTE — PLAN OF CARE
Assumed patient care at 1930, VS stable on RA, denies pain at rest, small discomfort at groin site when ambulating. 6 beat run of VT overnight while patient turning, patient alert/oriented with no chest pain or shortness of breath.  at bedside and updated on plan of care. Heparin gtt adjusted per ACS protocol. No further concerns at this time, RN to monitor. Problem: Patient/Family Goals  Goal: Patient/Family Long Term Goal  Description: Patient's Long Term Goal: Disease management    Interventions:  - Follow up with physicians as directed  - Medication management   - See additional Care Plan goals for specific interventions  Outcome: Progressing  Goal: Patient/Family Short Term Goal  Description: Patient's Short Term Goal: Hospital discharge    Interventions:   - Medication management  - Labs/tests as ordered  -participate in PT/OT/ST as ordered  - See additional Care Plan goals for specific interventions  Outcome: Progressing     Problem: CARDIOVASCULAR - ADULT  Goal: Maintains optimal cardiac output and hemodynamic stability  Description: INTERVENTIONS:  - Monitor vital signs, rhythm, and trends  - Monitor for bleeding, hypotension and signs of decreased cardiac output  - Evaluate effectiveness of vasoactive medications to optimize hemodynamic stability  - Monitor arterial and/or venous puncture sites for bleeding and/or hematoma  - Assess quality of pulses, skin color and temperature  - Assess for signs of decreased coronary artery perfusion - ex.  Angina  - Evaluate fluid balance, assess for edema, trend weights  Outcome: Progressing  Goal: Absence of cardiac arrhythmias or at baseline  Description: INTERVENTIONS:  - Continuous cardiac monitoring, monitor vital signs, obtain 12 lead EKG if indicated  - Evaluate effectiveness of antiarrhythmic and heart rate control medications as ordered  - Initiate emergency measures for life threatening arrhythmias  - Monitor electrolytes and administer replacement therapy as ordered  Outcome: Progressing     Problem: PAIN - ADULT  Goal: Verbalizes/displays adequate comfort level or patient's stated pain goal  Description: INTERVENTIONS:  - Encourage pt to monitor pain and request assistance  - Assess pain using appropriate pain scale  - Administer analgesics based on type and severity of pain and evaluate response  - Implement non-pharmacological measures as appropriate and evaluate response  - Consider cultural and social influences on pain and pain management  - Manage/alleviate anxiety  - Utilize distraction and/or relaxation techniques  - Monitor for opioid side effects  - Notify MD/LIP if interventions unsuccessful or patient reports new pain  - Anticipate increased pain with activity and pre-medicate as appropriate  Outcome: Progressing     Problem: RISK FOR INFECTION - ADULT  Goal: Absence of fever/infection during anticipated neutropenic period  Description: INTERVENTIONS  - Monitor WBC  - Administer growth factors as ordered  - Implement neutropenic guidelines  Outcome: Progressing     Problem: SAFETY ADULT - FALL  Goal: Free from fall injury  Description: INTERVENTIONS:  - Assess pt frequently for physical needs  - Identify cognitive and physical deficits and behaviors that affect risk of falls.   - Mobile fall precautions as indicated by assessment.  - Educate pt/family on patient safety including physical limitations  - Instruct pt to call for assistance with activity based on assessment  - Modify environment to reduce risk of injury  - Provide assistive devices as appropriate  - Consider OT/PT consult to assist with strengthening/mobility  - Encourage toileting schedule  Outcome: Progressing     Problem: DISCHARGE PLANNING  Goal: Discharge to home or other facility with appropriate resources  Description: INTERVENTIONS:  - Identify barriers to discharge w/pt and caregiver  - Include patient/family/discharge partner in discharge planning  - Arrange for needed discharge resources and transportation as appropriate  - Identify discharge learning needs (meds, wound care, etc)  - Arrange for interpreters to assist at discharge as needed  - Consider post-discharge preferences of patient/family/discharge partner  - Complete POLST form as appropriate  - Assess patient's ability to be responsible for managing their own health  - Refer to Case Management Department for coordinating discharge planning if the patient needs post-hospital services based on physician/LIP order or complex needs related to functional status, cognitive ability or social support system  Outcome: Progressing     Problem: Altered Communication/Language Barrier  Goal: Patient/Family is able to understand and participate in their care  Description: Interventions:  - Assess communication ability and preferred communication style  - Implement communication aides and strategies  - Use visual cues when possible  - Listen attentively, be patient, do not interrupt  - Minimize distractions  - Allow time for understanding and response  - Establish method for patient to ask for assistance (call light)  - Provide an  as needed  - Communicate barriers and strategies to overcome with those who interact with patient  Outcome: Progressing

## 2023-04-27 NOTE — PLAN OF CARE
Assumed care of patient this morning. Patient a+ox4. Cantu. Follows commands. denies sob/dizz/palp. C/o chest apan/squeezing 3/10. ekg done. APN notified. Echo ordered. Cardiology at bedside. No further orders. Ambulating in harden. Vss. See assessment for complete details. Plan for home tomorrow per cards. Will continue to monitor.

## 2023-04-28 VITALS
HEIGHT: 67 IN | RESPIRATION RATE: 28 BRPM | TEMPERATURE: 98 F | WEIGHT: 255.63 LBS | BODY MASS INDEX: 40.12 KG/M2 | DIASTOLIC BLOOD PRESSURE: 88 MMHG | OXYGEN SATURATION: 96 % | HEART RATE: 113 BPM | SYSTOLIC BLOOD PRESSURE: 109 MMHG

## 2023-04-28 LAB
ANION GAP SERPL CALC-SCNC: 4 MMOL/L (ref 0–18)
ATRIAL RATE: 85 BPM
ATRIAL RATE: 94 BPM
BUN BLD-MCNC: 17 MG/DL (ref 7–18)
CALCIUM BLD-MCNC: 10.5 MG/DL (ref 8.5–10.1)
CHLORIDE SERPL-SCNC: 101 MMOL/L (ref 98–112)
CO2 SERPL-SCNC: 29 MMOL/L (ref 21–32)
CREAT BLD-MCNC: 1.19 MG/DL
GFR SERPLBLD BASED ON 1.73 SQ M-ARVRAT: 59 ML/MIN/1.73M2 (ref 60–?)
GLUCOSE BLD-MCNC: 115 MG/DL (ref 70–99)
OSMOLALITY SERPL CALC.SUM OF ELEC: 280 MOSM/KG (ref 275–295)
P AXIS: 55 DEGREES
P AXIS: 55 DEGREES
P-R INTERVAL: 156 MS
P-R INTERVAL: 158 MS
POTASSIUM SERPL-SCNC: 3.8 MMOL/L (ref 3.5–5.1)
Q-T INTERVAL: 368 MS
Q-T INTERVAL: 410 MS
QRS DURATION: 94 MS
QRS DURATION: 98 MS
QTC CALCULATION (BEZET): 460 MS
QTC CALCULATION (BEZET): 487 MS
R AXIS: 29 DEGREES
R AXIS: 47 DEGREES
SODIUM SERPL-SCNC: 134 MMOL/L (ref 136–145)
T AXIS: 121 DEGREES
T AXIS: 153 DEGREES
VENTRICULAR RATE: 85 BPM
VENTRICULAR RATE: 94 BPM

## 2023-04-28 PROCEDURE — 99239 HOSP IP/OBS DSCHRG MGMT >30: CPT | Performed by: STUDENT IN AN ORGANIZED HEALTH CARE EDUCATION/TRAINING PROGRAM

## 2023-04-28 RX ORDER — MAGNESIUM HYDROXIDE/ALUMINUM HYDROXICE/SIMETHICONE 120; 1200; 1200 MG/30ML; MG/30ML; MG/30ML
30 SUSPENSION ORAL ONCE
Status: COMPLETED | OUTPATIENT
Start: 2023-04-28 | End: 2023-04-28

## 2023-04-28 RX ORDER — SPIRONOLACTONE 25 MG/1
25 TABLET ORAL DAILY
Qty: 30 TABLET | Refills: 3 | Status: ON HOLD | OUTPATIENT
Start: 2023-04-29

## 2023-04-28 RX ORDER — LOSARTAN POTASSIUM 50 MG/1
50 TABLET ORAL 2 TIMES DAILY
Qty: 60 TABLET | Refills: 3 | Status: ON HOLD | OUTPATIENT
Start: 2023-04-28

## 2023-04-28 RX ORDER — ASPIRIN 81 MG/1
81 TABLET ORAL DAILY
Qty: 90 TABLET | Refills: 3 | Status: ON HOLD | OUTPATIENT
Start: 2023-04-29

## 2023-04-28 RX ORDER — CARVEDILOL 12.5 MG/1
12.5 TABLET ORAL 2 TIMES DAILY WITH MEALS
Qty: 60 TABLET | Refills: 3 | Status: ON HOLD | OUTPATIENT
Start: 2023-04-28

## 2023-04-28 RX ORDER — CLOPIDOGREL BISULFATE 75 MG/1
75 TABLET ORAL DAILY
Qty: 30 TABLET | Refills: 11 | Status: ON HOLD | OUTPATIENT
Start: 2023-04-29

## 2023-04-28 RX ORDER — POTASSIUM CHLORIDE 20 MEQ/1
40 TABLET, EXTENDED RELEASE ORAL ONCE
Status: DISCONTINUED | OUTPATIENT
Start: 2023-04-28 | End: 2023-04-28

## 2023-04-28 RX ORDER — HYDROCHLOROTHIAZIDE 12.5 MG/1
12.5 CAPSULE, GELATIN COATED ORAL DAILY
Qty: 30 CAPSULE | Refills: 3 | Status: ON HOLD | OUTPATIENT
Start: 2023-04-28

## 2023-04-28 RX ORDER — PANTOPRAZOLE SODIUM 40 MG/1
40 TABLET, DELAYED RELEASE ORAL DAILY
Qty: 30 TABLET | Refills: 0 | Status: ON HOLD | OUTPATIENT
Start: 2023-04-28 | End: 2023-05-28

## 2023-04-28 RX ORDER — ROSUVASTATIN CALCIUM 20 MG/1
20 TABLET, COATED ORAL NIGHTLY
Qty: 30 TABLET | Refills: 3 | Status: ON HOLD | OUTPATIENT
Start: 2023-04-28

## 2023-04-28 NOTE — PLAN OF CARE
VSS, Discharge order entered and discharge education completed with patient. Questions answered. Meds to beds to deliver new prescriptions to bedside. PIV's removed.  will take pt home via private vehicle. No other issues at this time.

## 2023-04-28 NOTE — PLAN OF CARE
Assumed patient care at [address] [place] [address], VS stable on RA, A/O x4, REYNOLDS, independent of ADLs. Patient denied chest pain, no shortness of breath with activity. Rested well overnight, no further concerns at this time, RN to monitor. Problem: Patient/Family Goals  Goal: Patient/Family Long Term Goal  Description: Patient's Long Term Goal: Disease management    Interventions:  - Follow up with physicians as directed  - Medication management   - See additional Care Plan goals for specific interventions  4/27/2023 2121 by Serenity Ibarra RN  Outcome: Progressing  4/27/2023 2119 by Serenity Ibarra RN  Outcome: Progressing  Goal: Patient/Family Short Term Goal  Description: Patient's Short Term Goal: Hospital discharge    Interventions:   - Medication management  - Labs/tests as ordered  -participate in PT/OT/ST as ordered  - See additional Care Plan goals for specific interventions  4/27/2023 2121 by Serenity Ibarra RN  Outcome: Progressing  4/27/2023 2119 by Serenity Ibarra RN  Outcome: Progressing     Problem: CARDIOVASCULAR - ADULT  Goal: Maintains optimal cardiac output and hemodynamic stability  Description: INTERVENTIONS:  - Monitor vital signs, rhythm, and trends  - Monitor for bleeding, hypotension and signs of decreased cardiac output  - Evaluate effectiveness of vasoactive medications to optimize hemodynamic stability  - Monitor arterial and/or venous puncture sites for bleeding and/or hematoma  - Assess quality of pulses, skin color and temperature  - Assess for signs of decreased coronary artery perfusion - ex.  Angina  - Evaluate fluid balance, assess for edema, trend weights  4/27/2023 2121 by Serenity Ibarra RN  Outcome: Progressing  4/27/2023 2119 by Serenity Ibarra RN  Outcome: Progressing  Goal: Absence of cardiac arrhythmias or at baseline  Description: INTERVENTIONS:  - Continuous cardiac monitoring, monitor vital signs, obtain 12 lead EKG if indicated  - Evaluate effectiveness of antiarrhythmic and heart rate control medications as ordered  - Initiate emergency measures for life threatening arrhythmias  - Monitor electrolytes and administer replacement therapy as ordered  4/27/2023 2121 by Tram Bourgeois RN  Outcome: Progressing  4/27/2023 2119 by Tram Bourgeois RN  Outcome: Progressing     Problem: PAIN - ADULT  Goal: Verbalizes/displays adequate comfort level or patient's stated pain goal  Description: INTERVENTIONS:  - Encourage pt to monitor pain and request assistance  - Assess pain using appropriate pain scale  - Administer analgesics based on type and severity of pain and evaluate response  - Implement non-pharmacological measures as appropriate and evaluate response  - Consider cultural and social influences on pain and pain management  - Manage/alleviate anxiety  - Utilize distraction and/or relaxation techniques  - Monitor for opioid side effects  - Notify MD/LIP if interventions unsuccessful or patient reports new pain  - Anticipate increased pain with activity and pre-medicate as appropriate  4/27/2023 2121 by Tram Bourgeois RN  Outcome: Progressing  4/27/2023 2119 by Tram Bourgeois RN  Outcome: Progressing     Problem: RISK FOR INFECTION - ADULT  Goal: Absence of fever/infection during anticipated neutropenic period  Description: INTERVENTIONS  - Monitor WBC  - Administer growth factors as ordered  - Implement neutropenic guidelines  4/27/2023 2121 by Tram Bourgeois RN  Outcome: Progressing  4/27/2023 2119 by Tram Bourgeois RN  Outcome: Progressing     Problem: SAFETY ADULT - FALL  Goal: Free from fall injury  Description: INTERVENTIONS:  - Assess pt frequently for physical needs  - Identify cognitive and physical deficits and behaviors that affect risk of falls.   - [place] fall precautions as indicated by assessment.  - Educate pt/family on patient safety including physical limitations  - Instruct pt to call for assistance with activity based on assessment  - Modify environment to reduce risk of injury  - Provide assistive devices as appropriate  - Consider OT/PT consult to assist with strengthening/mobility  - Encourage toileting schedule  4/27/2023 2121 by Tylene Ganser, RN  Outcome: Progressing  4/27/2023 2119 by Tylene Ganser, RN  Outcome: Progressing     Problem: DISCHARGE PLANNING  Goal: Discharge to home or other facility with appropriate resources  Description: INTERVENTIONS:  - Identify barriers to discharge w/pt and caregiver  - Include patient/family/discharge partner in discharge planning  - Arrange for needed discharge resources and transportation as appropriate  - Identify discharge learning needs (meds, wound care, etc)  - Arrange for interpreters to assist at discharge as needed  - Consider post-discharge preferences of patient/family/discharge partner  - Complete POLST form as appropriate  - Assess patient's ability to be responsible for managing their own health  - Refer to Case Management Department for coordinating discharge planning if the patient needs post-hospital services based on physician/LIP order or complex needs related to functional status, cognitive ability or social support system  4/27/2023 2121 by Tylene Ganser, RN  Outcome: Progressing  4/27/2023 2119 by Tylene Ganser, RN  Outcome: Progressing     Problem: Altered Communication/Language Barrier  Goal: Patient/Family is able to understand and participate in their care  Description: Interventions:  - Assess communication ability and preferred communication style  - Implement communication aides and strategies  - Use visual cues when possible  - Listen attentively, be patient, do not interrupt  - Minimize distractions  - Allow time for understanding and response  - Establish method for patient to ask for assistance (call light)  - Provide an  as needed  - Communicate barriers and strategies to overcome with those who interact with patient  4/27/2023 2121 by Tylene Ganser, RN  Outcome: Progressing  4/27/2023 2119 by Tylene Ganser, RN  Outcome: Progressing

## 2023-04-29 ENCOUNTER — HOSPITAL ENCOUNTER (INPATIENT)
Facility: HOSPITAL | Age: 43
LOS: 10 days | Discharge: HOME HEALTH | End: 2023-05-10
Attending: EMERGENCY MEDICINE | Admitting: HOSPITALIST
Payer: COMMERCIAL

## 2023-04-29 ENCOUNTER — APPOINTMENT (OUTPATIENT)
Dept: GENERAL RADIOLOGY | Facility: HOSPITAL | Age: 43
End: 2023-04-29
Attending: EMERGENCY MEDICINE
Payer: COMMERCIAL

## 2023-04-29 ENCOUNTER — APPOINTMENT (OUTPATIENT)
Dept: GENERAL RADIOLOGY | Facility: HOSPITAL | Age: 43
End: 2023-04-29
Payer: COMMERCIAL

## 2023-04-29 DIAGNOSIS — R94.31 ST SEGMENT DEPRESSION: Primary | ICD-10-CM

## 2023-04-29 DIAGNOSIS — E87.1 HYPONATREMIA: ICD-10-CM

## 2023-04-29 DIAGNOSIS — F33.2 SEVERE RECURRENT MAJOR DEPRESSION WITHOUT PSYCHOTIC FEATURES (HCC): ICD-10-CM

## 2023-04-29 DIAGNOSIS — R73.9 HYPERGLYCEMIA: ICD-10-CM

## 2023-04-29 DIAGNOSIS — N28.9 RENAL INSUFFICIENCY: ICD-10-CM

## 2023-04-29 DIAGNOSIS — F41.1 GAD (GENERALIZED ANXIETY DISORDER): ICD-10-CM

## 2023-04-29 DIAGNOSIS — I21.4 NSTEMI (NON-ST ELEVATED MYOCARDIAL INFARCTION) (HCC): ICD-10-CM

## 2023-04-29 DIAGNOSIS — R07.9 ACUTE CHEST PAIN: ICD-10-CM

## 2023-04-29 DIAGNOSIS — J90 PLEURAL EFFUSION: ICD-10-CM

## 2023-04-29 LAB
ALBUMIN SERPL-MCNC: 3.9 G/DL (ref 3.4–5)
ALBUMIN/GLOB SERPL: 1.1 {RATIO} (ref 1–2)
ALP LIVER SERPL-CCNC: 89 U/L
ALT SERPL-CCNC: 47 U/L
ANION GAP SERPL CALC-SCNC: 7 MMOL/L (ref 0–18)
APTT PPP: 29.4 SECONDS (ref 23.3–35.6)
AST SERPL-CCNC: 30 U/L (ref 15–37)
BASOPHILS # BLD AUTO: 0.01 X10(3) UL (ref 0–0.2)
BASOPHILS NFR BLD AUTO: 0.1 %
BILIRUB SERPL-MCNC: 0.4 MG/DL (ref 0.1–2)
BUN BLD-MCNC: 25 MG/DL (ref 7–18)
CALCIUM BLD-MCNC: 9.6 MG/DL (ref 8.5–10.1)
CHLORIDE SERPL-SCNC: 99 MMOL/L (ref 98–112)
CO2 SERPL-SCNC: 27 MMOL/L (ref 21–32)
CREAT BLD-MCNC: 1.1 MG/DL
EOSINOPHIL # BLD AUTO: 0.01 X10(3) UL (ref 0–0.7)
EOSINOPHIL NFR BLD AUTO: 0.1 %
ERYTHROCYTE [DISTWIDTH] IN BLOOD BY AUTOMATED COUNT: 13.5 %
GFR SERPLBLD BASED ON 1.73 SQ M-ARVRAT: 65 ML/MIN/1.73M2 (ref 60–?)
GLOBULIN PLAS-MCNC: 3.7 G/DL (ref 2.8–4.4)
GLUCOSE BLD-MCNC: 125 MG/DL (ref 70–99)
HCT VFR BLD AUTO: 42.6 %
HGB BLD-MCNC: 14.4 G/DL
IMM GRANULOCYTES # BLD AUTO: 0.06 X10(3) UL (ref 0–1)
IMM GRANULOCYTES NFR BLD: 0.5 %
LYMPHOCYTES # BLD AUTO: 2 X10(3) UL (ref 1–4)
LYMPHOCYTES NFR BLD AUTO: 18.1 %
MCH RBC QN AUTO: 30.4 PG (ref 26–34)
MCHC RBC AUTO-ENTMCNC: 33.8 G/DL (ref 31–37)
MCV RBC AUTO: 90.1 FL
MONOCYTES # BLD AUTO: 1.07 X10(3) UL (ref 0.1–1)
MONOCYTES NFR BLD AUTO: 9.7 %
NEUTROPHILS # BLD AUTO: 7.93 X10 (3) UL (ref 1.5–7.7)
NEUTROPHILS # BLD AUTO: 7.93 X10(3) UL (ref 1.5–7.7)
NEUTROPHILS NFR BLD AUTO: 71.5 %
OSMOLALITY SERPL CALC.SUM OF ELEC: 282 MOSM/KG (ref 275–295)
PLATELET # BLD AUTO: 280 10(3)UL (ref 150–450)
POTASSIUM SERPL-SCNC: 4.3 MMOL/L (ref 3.5–5.1)
PROT SERPL-MCNC: 7.6 G/DL (ref 6.4–8.2)
RBC # BLD AUTO: 4.73 X10(6)UL
SODIUM SERPL-SCNC: 133 MMOL/L (ref 136–145)
TROPONIN I HIGH SENSITIVITY: 2368 NG/L
WBC # BLD AUTO: 11.1 X10(3) UL (ref 4–11)

## 2023-04-29 PROCEDURE — 99223 1ST HOSP IP/OBS HIGH 75: CPT | Performed by: HOSPITALIST

## 2023-04-29 PROCEDURE — 71045 X-RAY EXAM CHEST 1 VIEW: CPT | Performed by: EMERGENCY MEDICINE

## 2023-04-29 RX ORDER — HYDROMORPHONE HYDROCHLORIDE 1 MG/ML
INJECTION, SOLUTION INTRAMUSCULAR; INTRAVENOUS; SUBCUTANEOUS
Status: COMPLETED
Start: 2023-04-29 | End: 2023-04-29

## 2023-04-29 RX ORDER — MORPHINE SULFATE 2 MG/ML
2 INJECTION, SOLUTION INTRAMUSCULAR; INTRAVENOUS ONCE
Status: COMPLETED | OUTPATIENT
Start: 2023-04-29 | End: 2023-04-29

## 2023-04-29 RX ORDER — NITROGLYCERIN 20 MG/100ML
INJECTION INTRAVENOUS CONTINUOUS
Status: DISCONTINUED | OUTPATIENT
Start: 2023-04-29 | End: 2023-05-03

## 2023-04-29 RX ORDER — MORPHINE SULFATE 4 MG/ML
INJECTION, SOLUTION INTRAMUSCULAR; INTRAVENOUS
Status: DISPENSED
Start: 2023-04-29 | End: 2023-04-30

## 2023-04-29 RX ORDER — LORAZEPAM 2 MG/ML
INJECTION INTRAMUSCULAR
Status: COMPLETED
Start: 2023-04-29 | End: 2023-04-29

## 2023-04-29 RX ORDER — HYDROMORPHONE HYDROCHLORIDE 1 MG/ML
1 INJECTION, SOLUTION INTRAMUSCULAR; INTRAVENOUS; SUBCUTANEOUS ONCE
Status: COMPLETED | OUTPATIENT
Start: 2023-04-29 | End: 2023-04-29

## 2023-04-29 RX ORDER — HEPARIN SODIUM AND DEXTROSE 10000; 5 [USP'U]/100ML; G/100ML
1000 INJECTION INTRAVENOUS ONCE
Status: COMPLETED | OUTPATIENT
Start: 2023-04-29 | End: 2023-04-30

## 2023-04-29 RX ORDER — LORAZEPAM 2 MG/ML
1 INJECTION INTRAMUSCULAR ONCE
Status: COMPLETED | OUTPATIENT
Start: 2023-04-29 | End: 2023-04-29

## 2023-04-29 RX ORDER — HEPARIN SODIUM 1000 [USP'U]/ML
5000 INJECTION, SOLUTION INTRAVENOUS; SUBCUTANEOUS ONCE
Status: COMPLETED | OUTPATIENT
Start: 2023-04-29 | End: 2023-04-29

## 2023-04-30 ENCOUNTER — APPOINTMENT (OUTPATIENT)
Dept: CV DIAGNOSTICS | Facility: HOSPITAL | Age: 43
End: 2023-04-30
Attending: INTERNAL MEDICINE
Payer: COMMERCIAL

## 2023-04-30 PROBLEM — E87.1 HYPONATREMIA: Status: ACTIVE | Noted: 2023-04-30

## 2023-04-30 PROBLEM — I21.4 NSTEMI (NON-ST ELEVATED MYOCARDIAL INFARCTION) (HCC): Status: ACTIVE | Noted: 2023-04-24

## 2023-04-30 PROBLEM — N28.9 RENAL INSUFFICIENCY: Status: ACTIVE | Noted: 2023-04-30

## 2023-04-30 PROBLEM — R07.9 ACUTE CHEST PAIN: Status: ACTIVE | Noted: 2023-04-30

## 2023-04-30 PROBLEM — R73.9 HYPERGLYCEMIA: Status: ACTIVE | Noted: 2023-04-30

## 2023-04-30 LAB
ANION GAP SERPL CALC-SCNC: 6 MMOL/L (ref 0–18)
APTT PPP: 48.9 SECONDS (ref 23.3–35.6)
APTT PPP: 55.4 SECONDS (ref 23.3–35.6)
ATRIAL RATE: 95 BPM
ATRIAL RATE: 97 BPM
BASOPHILS # BLD AUTO: 0.01 X10(3) UL (ref 0–0.2)
BASOPHILS NFR BLD AUTO: 0.1 %
BUN BLD-MCNC: 18 MG/DL (ref 7–18)
CALCIUM BLD-MCNC: 8.4 MG/DL (ref 8.5–10.1)
CHLORIDE SERPL-SCNC: 102 MMOL/L (ref 98–112)
CO2 SERPL-SCNC: 26 MMOL/L (ref 21–32)
CREAT BLD-MCNC: 0.71 MG/DL
EOSINOPHIL # BLD AUTO: 0 X10(3) UL (ref 0–0.7)
EOSINOPHIL NFR BLD AUTO: 0 %
ERYTHROCYTE [DISTWIDTH] IN BLOOD BY AUTOMATED COUNT: 13.8 %
GFR SERPLBLD BASED ON 1.73 SQ M-ARVRAT: 110 ML/MIN/1.73M2 (ref 60–?)
GLUCOSE BLD-MCNC: 141 MG/DL (ref 70–99)
HCT VFR BLD AUTO: 36.6 %
HGB BLD-MCNC: 12.6 G/DL
IMM GRANULOCYTES # BLD AUTO: 0.06 X10(3) UL (ref 0–1)
IMM GRANULOCYTES NFR BLD: 0.6 %
LYMPHOCYTES # BLD AUTO: 1.55 X10(3) UL (ref 1–4)
LYMPHOCYTES NFR BLD AUTO: 15.1 %
MCH RBC QN AUTO: 30.1 PG (ref 26–34)
MCHC RBC AUTO-ENTMCNC: 34.4 G/DL (ref 31–37)
MCV RBC AUTO: 87.6 FL
MONOCYTES # BLD AUTO: 0.9 X10(3) UL (ref 0.1–1)
MONOCYTES NFR BLD AUTO: 8.8 %
NEUTROPHILS # BLD AUTO: 7.75 X10 (3) UL (ref 1.5–7.7)
NEUTROPHILS # BLD AUTO: 7.75 X10(3) UL (ref 1.5–7.7)
NEUTROPHILS NFR BLD AUTO: 75.4 %
OSMOLALITY SERPL CALC.SUM OF ELEC: 282 MOSM/KG (ref 275–295)
P AXIS: 54 DEGREES
P AXIS: 64 DEGREES
P-R INTERVAL: 154 MS
P-R INTERVAL: 170 MS
PLATELET # BLD AUTO: 250 10(3)UL (ref 150–450)
POTASSIUM SERPL-SCNC: 3.8 MMOL/L (ref 3.5–5.1)
Q-T INTERVAL: 366 MS
Q-T INTERVAL: 418 MS
QRS DURATION: 96 MS
QRS DURATION: 96 MS
QTC CALCULATION (BEZET): 459 MS
QTC CALCULATION (BEZET): 530 MS
R AXIS: 26 DEGREES
R AXIS: 34 DEGREES
RBC # BLD AUTO: 4.18 X10(6)UL
SARS-COV-2 RNA RESP QL NAA+PROBE: NOT DETECTED
SODIUM SERPL-SCNC: 134 MMOL/L (ref 136–145)
T AXIS: -47 DEGREES
T AXIS: 12 DEGREES
TROPONIN I HIGH SENSITIVITY: 2716 NG/L
VENTRICULAR RATE: 95 BPM
VENTRICULAR RATE: 97 BPM
WBC # BLD AUTO: 10.3 X10(3) UL (ref 4–11)

## 2023-04-30 PROCEDURE — 93308 TTE F-UP OR LMTD: CPT | Performed by: INTERNAL MEDICINE

## 2023-04-30 PROCEDURE — 99232 SBSQ HOSP IP/OBS MODERATE 35: CPT | Performed by: STUDENT IN AN ORGANIZED HEALTH CARE EDUCATION/TRAINING PROGRAM

## 2023-04-30 RX ORDER — ONDANSETRON 2 MG/ML
4 INJECTION INTRAMUSCULAR; INTRAVENOUS EVERY 6 HOURS PRN
Status: DISCONTINUED | OUTPATIENT
Start: 2023-04-30 | End: 2023-05-05

## 2023-04-30 RX ORDER — VENLAFAXINE HYDROCHLORIDE 75 MG/1
150 CAPSULE, EXTENDED RELEASE ORAL 2 TIMES DAILY
Status: DISCONTINUED | OUTPATIENT
Start: 2023-04-30 | End: 2023-05-10

## 2023-04-30 RX ORDER — SODIUM PHOSPHATE, DIBASIC AND SODIUM PHOSPHATE, MONOBASIC 7; 19 G/133ML; G/133ML
1 ENEMA RECTAL ONCE AS NEEDED
Status: DISCONTINUED | OUTPATIENT
Start: 2023-04-30 | End: 2023-05-05

## 2023-04-30 RX ORDER — BISACODYL 10 MG
10 SUPPOSITORY, RECTAL RECTAL
Status: DISCONTINUED | OUTPATIENT
Start: 2023-04-30 | End: 2023-05-05

## 2023-04-30 RX ORDER — MORPHINE SULFATE 4 MG/ML
4 INJECTION, SOLUTION INTRAMUSCULAR; INTRAVENOUS EVERY 2 HOUR PRN
Status: DISCONTINUED | OUTPATIENT
Start: 2023-04-30 | End: 2023-05-05

## 2023-04-30 RX ORDER — PROCHLORPERAZINE EDISYLATE 5 MG/ML
5 INJECTION INTRAMUSCULAR; INTRAVENOUS EVERY 8 HOURS PRN
Status: DISCONTINUED | OUTPATIENT
Start: 2023-04-30 | End: 2023-05-05

## 2023-04-30 RX ORDER — LEVOTHYROXINE SODIUM 0.1 MG/1
200 TABLET ORAL
Status: DISCONTINUED | OUTPATIENT
Start: 2023-04-30 | End: 2023-05-10

## 2023-04-30 RX ORDER — MORPHINE SULFATE 2 MG/ML
INJECTION, SOLUTION INTRAMUSCULAR; INTRAVENOUS
Status: COMPLETED
Start: 2023-04-30 | End: 2023-04-30

## 2023-04-30 RX ORDER — SODIUM CHLORIDE 9 MG/ML
INJECTION, SOLUTION INTRAVENOUS CONTINUOUS
Status: DISCONTINUED | OUTPATIENT
Start: 2023-04-30 | End: 2023-05-03

## 2023-04-30 RX ORDER — POTASSIUM CHLORIDE 20 MEQ/1
40 TABLET, EXTENDED RELEASE ORAL ONCE
Status: DISCONTINUED | OUTPATIENT
Start: 2023-04-30 | End: 2023-04-30

## 2023-04-30 RX ORDER — ALPRAZOLAM 0.25 MG/1
0.25 TABLET ORAL 3 TIMES DAILY PRN
Status: DISCONTINUED | OUTPATIENT
Start: 2023-04-30 | End: 2023-05-03

## 2023-04-30 RX ORDER — PANTOPRAZOLE SODIUM 40 MG/1
40 TABLET, DELAYED RELEASE ORAL DAILY
Status: DISCONTINUED | OUTPATIENT
Start: 2023-04-30 | End: 2023-05-05

## 2023-04-30 RX ORDER — POLYETHYLENE GLYCOL 3350 17 G/17G
17 POWDER, FOR SOLUTION ORAL DAILY PRN
Status: DISCONTINUED | OUTPATIENT
Start: 2023-04-30 | End: 2023-05-05

## 2023-04-30 RX ORDER — HEPARIN SODIUM AND DEXTROSE 10000; 5 [USP'U]/100ML; G/100ML
INJECTION INTRAVENOUS CONTINUOUS
Status: DISCONTINUED | OUTPATIENT
Start: 2023-04-30 | End: 2023-05-05

## 2023-04-30 RX ORDER — LOSARTAN POTASSIUM 50 MG/1
50 TABLET ORAL 2 TIMES DAILY
Status: DISCONTINUED | OUTPATIENT
Start: 2023-04-30 | End: 2023-05-05

## 2023-04-30 RX ORDER — CARVEDILOL 12.5 MG/1
12.5 TABLET ORAL 2 TIMES DAILY WITH MEALS
Status: DISCONTINUED | OUTPATIENT
Start: 2023-04-30 | End: 2023-05-01

## 2023-04-30 RX ORDER — ROSUVASTATIN CALCIUM 20 MG/1
20 TABLET, COATED ORAL NIGHTLY
Status: DISCONTINUED | OUTPATIENT
Start: 2023-04-30 | End: 2023-05-10

## 2023-04-30 RX ORDER — MORPHINE SULFATE 4 MG/ML
4 INJECTION, SOLUTION INTRAMUSCULAR; INTRAVENOUS ONCE
Status: COMPLETED | OUTPATIENT
Start: 2023-04-29 | End: 2023-04-30

## 2023-04-30 RX ORDER — MELATONIN
3 NIGHTLY PRN
Status: DISCONTINUED | OUTPATIENT
Start: 2023-04-30 | End: 2023-05-05

## 2023-04-30 RX ORDER — ACETAMINOPHEN 500 MG
1000 TABLET ORAL EVERY 4 HOURS PRN
Status: DISCONTINUED | OUTPATIENT
Start: 2023-04-30 | End: 2023-05-05

## 2023-04-30 RX ORDER — BUSPIRONE HYDROCHLORIDE 15 MG/1
15 TABLET ORAL 2 TIMES DAILY
Status: DISCONTINUED | OUTPATIENT
Start: 2023-04-30 | End: 2023-05-10

## 2023-04-30 RX ORDER — ASPIRIN 81 MG/1
81 TABLET ORAL DAILY
Status: DISCONTINUED | OUTPATIENT
Start: 2023-04-30 | End: 2023-05-01

## 2023-04-30 RX ORDER — MORPHINE SULFATE 2 MG/ML
2 INJECTION, SOLUTION INTRAMUSCULAR; INTRAVENOUS EVERY 2 HOUR PRN
Status: DISCONTINUED | OUTPATIENT
Start: 2023-04-30 | End: 2023-05-05

## 2023-04-30 RX ORDER — HYDROMORPHONE HYDROCHLORIDE 1 MG/ML
1 INJECTION, SOLUTION INTRAMUSCULAR; INTRAVENOUS; SUBCUTANEOUS ONCE
Status: COMPLETED | OUTPATIENT
Start: 2023-04-29 | End: 2023-04-29

## 2023-04-30 RX ORDER — SENNOSIDES 8.6 MG
17.2 TABLET ORAL NIGHTLY PRN
Status: DISCONTINUED | OUTPATIENT
Start: 2023-04-30 | End: 2023-05-05

## 2023-04-30 RX ORDER — BENZONATATE 100 MG/1
200 CAPSULE ORAL 3 TIMES DAILY PRN
Status: DISCONTINUED | OUTPATIENT
Start: 2023-04-30 | End: 2023-05-10

## 2023-04-30 RX ORDER — MORPHINE SULFATE 2 MG/ML
1 INJECTION, SOLUTION INTRAMUSCULAR; INTRAVENOUS EVERY 2 HOUR PRN
Status: DISCONTINUED | OUTPATIENT
Start: 2023-04-30 | End: 2023-05-05

## 2023-04-30 RX ORDER — SODIUM CHLORIDE 9 MG/ML
INJECTION, SOLUTION INTRAVENOUS
Status: COMPLETED | OUTPATIENT
Start: 2023-05-01 | End: 2023-05-01

## 2023-04-30 RX ORDER — POTASSIUM CHLORIDE 20 MEQ/1
40 TABLET, EXTENDED RELEASE ORAL ONCE
Status: COMPLETED | OUTPATIENT
Start: 2023-04-30 | End: 2023-04-30

## 2023-04-30 NOTE — PLAN OF CARE
A/Ox4, chest pain managed with nitroglycerin, states 1-3/10, heparin gtt per ACS protocol therapeutic. NSR with ST depression, BP stable. Pt maintained on bedrest. Plan for PCI tomorrow, consent signed.  at bedside throughout shift. Problem: CARDIOVASCULAR - ADULT  Goal: Maintains optimal cardiac output and hemodynamic stability  Description: INTERVENTIONS:  - Monitor vital signs, rhythm, and trends  - Monitor for bleeding, hypotension and signs of decreased cardiac output  - Evaluate effectiveness of vasoactive medications to optimize hemodynamic stability  - Monitor arterial and/or venous puncture sites for bleeding and/or hematoma  - Assess quality of pulses, skin color and temperature  - Assess for signs of decreased coronary artery perfusion - ex.  Angina  - Evaluate fluid balance, assess for edema, trend weights  Outcome: Progressing  Goal: Absence of cardiac arrhythmias or at baseline  Description: INTERVENTIONS:  - Continuous cardiac monitoring, monitor vital signs, obtain 12 lead EKG if indicated  - Evaluate effectiveness of antiarrhythmic and heart rate control medications as ordered  - Initiate emergency measures for life threatening arrhythmias  - Monitor electrolytes and administer replacement therapy as ordered  Outcome: Progressing

## 2023-04-30 NOTE — PLAN OF CARE
Patient care assumed 0045 from ER with Heparin and Nitro infusing via PIVs. ST on monitor with noted ST depression in multiple leads. Drowsy post ativan. Purewick in place. Normotensive on significant Nitroglycerin dosage (ordered per cardiology). Chest pain on arrival reportedly 5/10, greatly improved from earlier, this is stated to be tolerable. Nitro headache noted as well, tylenol given. Patient resting, frequently checked upon.

## 2023-05-01 ENCOUNTER — APPOINTMENT (OUTPATIENT)
Dept: INTERVENTIONAL RADIOLOGY/VASCULAR | Facility: HOSPITAL | Age: 43
End: 2023-05-01
Attending: NURSE PRACTITIONER
Payer: COMMERCIAL

## 2023-05-01 ENCOUNTER — PATIENT OUTREACH (OUTPATIENT)
Dept: CASE MANAGEMENT | Age: 43
End: 2023-05-01

## 2023-05-01 LAB
ANION GAP SERPL CALC-SCNC: 0 MMOL/L (ref 0–18)
APTT PPP: 60.6 SECONDS (ref 23.3–35.6)
ATRIAL RATE: 98 BPM
BUN BLD-MCNC: 8 MG/DL (ref 7–18)
CALCIUM BLD-MCNC: 8.7 MG/DL (ref 8.5–10.1)
CHLORIDE SERPL-SCNC: 107 MMOL/L (ref 98–112)
CO2 SERPL-SCNC: 28 MMOL/L (ref 21–32)
CREAT BLD-MCNC: 0.64 MG/DL
ERYTHROCYTE [DISTWIDTH] IN BLOOD BY AUTOMATED COUNT: 13.7 %
GFR SERPLBLD BASED ON 1.73 SQ M-ARVRAT: 115 ML/MIN/1.73M2 (ref 60–?)
GLUCOSE BLD-MCNC: 118 MG/DL (ref 70–99)
HCT VFR BLD AUTO: 36.8 %
HGB BLD-MCNC: 12.4 G/DL
ISTAT ACTIVATED CLOTTING TIME: 371 SECONDS (ref 74–137)
MCH RBC QN AUTO: 30.1 PG (ref 26–34)
MCHC RBC AUTO-ENTMCNC: 33.7 G/DL (ref 31–37)
MCV RBC AUTO: 89.3 FL
OSMOLALITY SERPL CALC.SUM OF ELEC: 279 MOSM/KG (ref 275–295)
P AXIS: 60 DEGREES
P-R INTERVAL: 180 MS
PLATELET # BLD AUTO: 205 10(3)UL (ref 150–450)
PLATELET # BLD AUTO: 205 10(3)UL (ref 150–450)
POTASSIUM SERPL-SCNC: 4 MMOL/L (ref 3.5–5.1)
POTASSIUM SERPL-SCNC: 4 MMOL/L (ref 3.5–5.1)
Q-T INTERVAL: 394 MS
QRS DURATION: 98 MS
QTC CALCULATION (BEZET): 503 MS
R AXIS: 77 DEGREES
RBC # BLD AUTO: 4.12 X10(6)UL
SODIUM SERPL-SCNC: 135 MMOL/L (ref 136–145)
T AXIS: -10 DEGREES
TROPONIN I HIGH SENSITIVITY: 6899 NG/L
VENTRICULAR RATE: 98 BPM
WBC # BLD AUTO: 7.1 X10(3) UL (ref 4–11)

## 2023-05-01 PROCEDURE — 5A02210 ASSISTANCE WITH CARDIAC OUTPUT USING BALLOON PUMP, CONTINUOUS: ICD-10-PCS | Performed by: INTERNAL MEDICINE

## 2023-05-01 PROCEDURE — 4A023N7 MEASUREMENT OF CARDIAC SAMPLING AND PRESSURE, LEFT HEART, PERCUTANEOUS APPROACH: ICD-10-PCS | Performed by: INTERNAL MEDICINE

## 2023-05-01 PROCEDURE — B241ZZ3 ULTRASONOGRAPHY OF MULTIPLE CORONARY ARTERIES, INTRAVASCULAR: ICD-10-PCS | Performed by: INTERNAL MEDICINE

## 2023-05-01 PROCEDURE — B41FYZZ FLUOROSCOPY OF RIGHT LOWER EXTREMITY ARTERIES USING OTHER CONTRAST: ICD-10-PCS | Performed by: INTERNAL MEDICINE

## 2023-05-01 PROCEDURE — 99232 SBSQ HOSP IP/OBS MODERATE 35: CPT | Performed by: STUDENT IN AN ORGANIZED HEALTH CARE EDUCATION/TRAINING PROGRAM

## 2023-05-01 PROCEDURE — B211YZZ FLUOROSCOPY OF MULTIPLE CORONARY ARTERIES USING OTHER CONTRAST: ICD-10-PCS | Performed by: INTERNAL MEDICINE

## 2023-05-01 RX ORDER — WATER 1000 ML/1000ML
INJECTION, SOLUTION INTRAVENOUS
Status: COMPLETED
Start: 2023-05-01 | End: 2023-05-01

## 2023-05-01 RX ORDER — MIDAZOLAM HYDROCHLORIDE 1 MG/ML
INJECTION INTRAMUSCULAR; INTRAVENOUS
Status: COMPLETED
Start: 2023-05-01 | End: 2023-05-01

## 2023-05-01 RX ORDER — ASPIRIN 81 MG/1
81 TABLET ORAL DAILY
Status: DISCONTINUED | OUTPATIENT
Start: 2023-05-01 | End: 2023-05-05 | Stop reason: HOSPADM

## 2023-05-01 RX ORDER — CARVEDILOL 12.5 MG/1
25 TABLET ORAL 2 TIMES DAILY WITH MEALS
Status: DISCONTINUED | OUTPATIENT
Start: 2023-05-01 | End: 2023-05-05

## 2023-05-01 RX ORDER — METOPROLOL TARTRATE 5 MG/5ML
5 INJECTION INTRAVENOUS ONCE
Status: COMPLETED | OUTPATIENT
Start: 2023-05-01 | End: 2023-05-01

## 2023-05-01 RX ORDER — LIDOCAINE HYDROCHLORIDE 10 MG/ML
INJECTION, SOLUTION EPIDURAL; INFILTRATION; INTRACAUDAL; PERINEURAL
Status: COMPLETED
Start: 2023-05-01 | End: 2023-05-01

## 2023-05-01 RX ORDER — VERAPAMIL HYDROCHLORIDE 2.5 MG/ML
INJECTION, SOLUTION INTRAVENOUS
Status: COMPLETED
Start: 2023-05-01 | End: 2023-05-01

## 2023-05-01 RX ORDER — HEPARIN SODIUM 5000 [USP'U]/ML
INJECTION, SOLUTION INTRAVENOUS; SUBCUTANEOUS
Status: COMPLETED
Start: 2023-05-01 | End: 2023-05-01

## 2023-05-01 NOTE — PROGRESS NOTES
Patient was discharged on 4/28/23 and then readmitted on 4/29/23. No TCM as patient is still currently admitted. Encounter closing.

## 2023-05-01 NOTE — PLAN OF CARE
Assumed care of patient at approximately 1930. Heparin gtt therapeutic this morning, infusing per ACS protocol. Nitroglycerin and IVF infusing. 2L NC. NSR on tele. NPO at midnight for scheduled LHC. External catheter in place with good urine output. Prn Morphine given for moderate headache. Problem: CARDIOVASCULAR - ADULT  Goal: Maintains optimal cardiac output and hemodynamic stability  Description: INTERVENTIONS:  - Monitor vital signs, rhythm, and trends  - Monitor for bleeding, hypotension and signs of decreased cardiac output  - Evaluate effectiveness of vasoactive medications to optimize hemodynamic stability  - Monitor arterial and/or venous puncture sites for bleeding and/or hematoma  - Assess quality of pulses, skin color and temperature  - Assess for signs of decreased coronary artery perfusion - ex.  Angina  - Evaluate fluid balance, assess for edema, trend weights  Outcome: Progressing  Goal: Absence of cardiac arrhythmias or at baseline  Description: INTERVENTIONS:  - Continuous cardiac monitoring, monitor vital signs, obtain 12 lead EKG if indicated  - Evaluate effectiveness of antiarrhythmic and heart rate control medications as ordered  - Initiate emergency measures for life threatening arrhythmias  - Monitor electrolytes and administer replacement therapy as ordered  Outcome: Progressing

## 2023-05-01 NOTE — PROCEDURES
[address]    Cardiac Catheterization Note    Primary Proceduralist: Rohini Ford MD  Procedure Performed: LHC, IVUS LAD, IVUS RI, IABP placement  Date of Procedure: 5/1/2023   Indication: NSTEMI  Estimated blood loss: 10cc  Specimens: None    Consent obtained from the patient and documented in the paper chart, unless verbally obtained in an emergency setting. The benefits and risk of the procedure, including but not limited to infection, bleeding, myocardial infarction, stroke, vascular injury, emergency surgery, renal failure requiring dialysis and death were discussed with the patient. These complications occur in approximately 1-2% of elective procedures, but the risk may be significantly elevated in the setting of acute coronary syndrome, electrical or hemodynamic instability, multivessel disease, reduced LVEF or . The patient consented to any additional procedures performed emergently in order to address a complication or prevent medical deterioration. Viable alternatives were explained to the patient, including continuing medical therapy, with the risks incurred along that course. Procedure/Technique:    Lidocaine 1% was administered locally. Access of right femoral artery obtained using Seldinger technique. Ultrasound was not used. 6 Fr Sheath was inserted. J-wire advanced into the aorta under fluoroscopy. Left coronary system engaged using 6 Fr JL4 diagnostic cath. Selective angiogram performed. Right coronary system engaged using 6 Fr JR4 diagnostic cath. Selective angiogram performed. 6 Fr pigtail catheter advanced into the LV. Hemodynamics were obtained. Coronary Angiogram Findings:  1. LM: Large caliber artery giving rise to LAD, RI, LCX. No significant stenosis. 2. LAD: Large caliber artery giving rise to diagonal and septal branches. 90% ostial stenosis, proximal to widely patient prox-to-mid LAD stennt  3. RI: Patent stent.  Possible RI distal thrombus. 4. LCX: Medium to large caliber artery giving rise to OM branches. No significant stenosis. 5. RCA: Large caliber artery giving rise to acute marginal branches, and bifurcates into RPDA & RPL. Probable proximal RCA dissection that is non-flow limiting. Hemodynamics:  /6, LVEDP 12  /85, mean 104  No significant gradient upon Ao-LV pullback  LVEF 50%      Intervention:  6 Fr EBU 3.75 Guide used to engage LM  Aaron blue placed in LAD  IVUS performed. It showed expanded well apposed stent. Proximal to the stent, there is severe stenosis secondary to concentric soft plaque. Aaron blue placed in RI  IVUS performed. Good stent expansion and apposition  IABP placed    Monitored sedation administered by the cath lab RN, and supervised throughout the procedure by myself. Total time 59 minutes. Closure: Femoral angiogram performed. No immediate complications. A/P:  1. 90% ostial LAD stenosis. Possible RI distal thrombus. Otherwise patent LAD and RI stents as interrogated on IVUS  2. Possible proximal RCA non-flow limiting dissection  3. No significant LM stenosis  4. IABP placement for hemodynamic support  5. CTS evaluation for possible CABG (vs. Complex PCI)  6. Continue ASA. Switch plavix to Cangrelor. Continue heparin per ACS protocol.       Keri Arteaga MD  Interventional Cardiology  [address] [place] [address]

## 2023-05-01 NOTE — PLAN OF CARE
To cath lab anxious tearful . Headache declined medication . No chest pain . Accompanied by RN  brought down to waiting room. 1045 Returned from cath lab with IABP 1:1 Hemostasis  maintained . Right groin site intact. Denies chest pain . Headache declined pain medication likes ice pack to forehead. 1400 hypertensive with increased headache discussed with LIZZ Coon see orders changed nitroglycerin to cardene working betterwith B/P  for headache morphine given IVP and dose of lopressor. Headache now from 7 a 3. No chest pain family at bedside supportive.

## 2023-05-02 ENCOUNTER — ANESTHESIA EVENT (OUTPATIENT)
Dept: CARDIAC SURGERY | Facility: HOSPITAL | Age: 43
End: 2023-05-02
Payer: COMMERCIAL

## 2023-05-02 LAB
APTT PPP: 185.9 SECONDS (ref 23.3–35.6)
APTT PPP: 36.2 SECONDS (ref 23.3–35.6)
APTT PPP: 38.4 SECONDS (ref 23.3–35.6)
APTT PPP: 45.3 SECONDS (ref 23.3–35.6)
ATRIAL RATE: 90 BPM
P AXIS: 71 DEGREES
P-R INTERVAL: 182 MS
P2Y12 REACTION UNITS: 155 PRU
PLATELET # BLD AUTO: 224 10(3)UL (ref 150–450)
Q-T INTERVAL: 344 MS
QRS DURATION: 96 MS
QTC CALCULATION (BEZET): 420 MS
R AXIS: 29 DEGREES
T AXIS: -65 DEGREES
VENTRICULAR RATE: 90 BPM

## 2023-05-02 PROCEDURE — 99232 SBSQ HOSP IP/OBS MODERATE 35: CPT | Performed by: STUDENT IN AN ORGANIZED HEALTH CARE EDUCATION/TRAINING PROGRAM

## 2023-05-02 RX ORDER — AMLODIPINE BESYLATE 5 MG/1
5 TABLET ORAL DAILY
Status: DISCONTINUED | OUTPATIENT
Start: 2023-05-02 | End: 2023-05-05

## 2023-05-02 RX ORDER — METOPROLOL TARTRATE 5 MG/5ML
5 INJECTION INTRAVENOUS ONCE
Status: COMPLETED | OUTPATIENT
Start: 2023-05-02 | End: 2023-05-02

## 2023-05-02 RX ORDER — NITROGLYCERIN 0.6 MG/1
0.6 TABLET SUBLINGUAL EVERY 5 MIN PRN
Status: DISCONTINUED | OUTPATIENT
Start: 2023-05-02 | End: 2023-05-05

## 2023-05-02 RX ORDER — NITROGLYCERIN 0.4 MG/1
TABLET SUBLINGUAL
Status: COMPLETED
Start: 2023-05-02 | End: 2023-05-02

## 2023-05-02 RX ORDER — METOPROLOL TARTRATE 5 MG/5ML
5 INJECTION INTRAVENOUS EVERY 6 HOURS PRN
Status: DISCONTINUED | OUTPATIENT
Start: 2023-05-02 | End: 2023-05-05

## 2023-05-02 NOTE — HOME CARE LIAISON
Received referral via [facility] for Home Health services. Spoke w/ patient and  who was at bedside and provided with list of [facility] providers from [facility], choice is [facility] . Agency reserved in [facility] and contact information placed on AVS.Financial interest disclosure provided.  Notified Farhan Sarah

## 2023-05-02 NOTE — PLAN OF CARE
Jose Scott Patient Status:  Inpatient    1980 MRN [id number]   [facility] 6NE-A Attending Geronimo Gonzalez 91 Day # 2 PCP Foster Daly, DO     Cardiology Nocturnal APN Plan of Care     Page Received: ICU RN [id number]    Patient with recent NSTEMI  with HAIDER to LAD and ramus branch  and now s/p LHC, IVUS LAD, IVUS RI, IABP placement  c/o sharp / burning midsternal CP, 7/10. IABP 1:1 without functional problems per RN, MS 2 mg without relief. IABP taken to 1:2 without change in symptoms and placed back at 1:1.  Cardene gtt on at 2.5 mg/hr.        Assessment/Plan:   - SL ntg to evaluate if there is relief of CP without causing HA that occurs with ntg gtt  - Per RN, communicated from day shift goal for remove IABP today         Suzanne Gardner, [address]  2023  5:23 AM

## 2023-05-02 NOTE — DISCHARGE INSTRUCTIONS
To access the Dr. Christy Cortes discharge instructions video on your home computer:   [email or web address]    Remove steri strips from all incisions on 5/19      Sometimes managing your health at home requires assistance. The [facility] team has recognized your preference to use [facility]. They can be reached by phone at [phone]. The fax number for your reference is [phone]. A representative from the home health agency will contact you or your family to schedule your first visit. [facility] @ discharge  [phone]    Your orientation appointment for cardiac rehabilitation is June 1st at 10am.  Please bring an order for cardiac rehabilitation from your cardiologist.  Please dress comfortably for the appointment. If you have questions about cardiac rehabilitation please call [phone].

## 2023-05-02 NOTE — PLAN OF CARE
Assumed patient care at 0730. Patient with ongoing chest pain from the early morning hours per night shift - at bedside this morning with 5-6/10 midsternal chest pain. Cardiology and CV at bedside. Order for 5 mg IV lopressor immediately and Q6. Cardene increased to keep SBP <130 per cardiology. Balloon pump to remain in place due to ongoing chest pain. Per cardiology - cangrelor stopped at 95 20 92 for one hour for a P2Y12 draw, will resume immediately once drawn at [phone]. Balloon pump site c/d/i, 1:1 augmentation. Pulses intact. CMS intact. Pt using external cath, good urine output. C/O some slight HA, Tylenol given PRN. Heparin continuing to be subtherapeutic this afternoon after 3x increasing dose - IV site slightly taut and no blood return. New IV placed and resumed per ACS protocol, Cardiology APN aware.  at bedside, updated on POC. Problem: CARDIOVASCULAR - ADULT  Goal: Maintains optimal cardiac output and hemodynamic stability  Description: INTERVENTIONS:  - Monitor vital signs, rhythm, and trends  - Monitor for bleeding, hypotension and signs of decreased cardiac output  - Evaluate effectiveness of vasoactive medications to optimize hemodynamic stability  - Monitor arterial and/or venous puncture sites for bleeding and/or hematoma  - Assess quality of pulses, skin color and temperature  - Assess for signs of decreased coronary artery perfusion - ex.  Angina  - Evaluate fluid balance, assess for edema, trend weights  Outcome: Progressing     Problem: CARDIOVASCULAR - ADULT  Goal: Absence of cardiac arrhythmias or at baseline  Description: INTERVENTIONS:  - Continuous cardiac monitoring, monitor vital signs, obtain 12 lead EKG if indicated  - Evaluate effectiveness of antiarrhythmic and heart rate control medications as ordered  - Initiate emergency measures for life threatening arrhythmias  - Monitor electrolytes and administer replacement therapy as ordered  Outcome: Progressing

## 2023-05-02 NOTE — PLAN OF CARE
Patient care assumed 1930 in bed with IABP in R fem artery at 1:1 counterpulsation. Augmentation pressures between 120-140s, roughly 10-15 points higher than unassisted SBP. BRIGIDO has heparin ACS running as well as cangrelor at maintenance rate. Cardene turned off overnight. NSR/ST, diastolics are high: 83-652R. IABP having some issues with timing, usually resolved with repositioning of leads. 0455: Pt reporting 6/10 burning pressure across mid chest. ECG obtained (unremarkable), Cards paged. LIZZ Nuñez quickly responded. Discussed current SBP, Augmentation in IABP, possible anxiety etiology for pain. IABP set to 1:2 for ~10 minutes without improvement, morphine given without improvement, Cardene restarted for SBP 150s with little improvement. SL Nitro given with no improvement, second dose given + second 2mg morphine dose. Awaiting cardiology in person. 0600: mild improvement from 8 -> 6/10 CP but negligible improvement overall.

## 2023-05-02 NOTE — CM/SW NOTE
05/02/23 1000   CM/SW Referral Data   Referral Source Physician;   Reason for Referral Discharge planning;PHQ4/PHQ9   Readmission Assessment   Factors that patient feels contributed to this readmission Acute/Chronic Clinical Presentation   Patient Info   Patient's Current Mental Status at Time of Assessment Alert;Oriented   Patient's [address]   Number of Levels in Home 2   Patient lives with Spouse/Significant other   Patient Status Prior to Admission   Independent with ADLs and Mobility Yes   Discharge Needs   Anticipated D/C needs Home health care   Choice of Post-Acute Provider   Informed patient of right to choose their preferred provider Yes     Pt discussed in rounds and CV surgery following for possible CABG later this week. PHQ4 order received    Met with patient at the bedside to discuss discharge planning for post CABG and pt agreeable to likely plan for Home Health  Pt lives with her  and children aged 5 and 6     Re: PHQ4, pt reports she has already received resources during last admission. Pt reports her feelings of anxiety and worry are related to her current medical condition. and she reports no further needs. HH referral sent via [facility]. / to remain available for support and/or discharge planning.      Laura STEPHENSA MSN, RN CTL/  [id number]

## 2023-05-03 ENCOUNTER — APPOINTMENT (OUTPATIENT)
Dept: CV DIAGNOSTICS | Facility: HOSPITAL | Age: 43
End: 2023-05-03
Attending: NURSE PRACTITIONER
Payer: COMMERCIAL

## 2023-05-03 LAB
APTT PPP: 189 SECONDS (ref 23.3–35.6)
APTT PPP: 234.1 SECONDS (ref 23.3–35.6)
ATRIAL RATE: 86 BPM
BASOPHILS # BLD AUTO: 0.01 X10(3) UL (ref 0–0.2)
BASOPHILS NFR BLD AUTO: 0.1 %
EOSINOPHIL # BLD AUTO: 0 X10(3) UL (ref 0–0.7)
EOSINOPHIL NFR BLD AUTO: 0 %
ERYTHROCYTE [DISTWIDTH] IN BLOOD BY AUTOMATED COUNT: 13.4 %
HCT VFR BLD AUTO: 39.2 %
HGB BLD-MCNC: 13 G/DL
IMM GRANULOCYTES # BLD AUTO: 0.05 X10(3) UL (ref 0–1)
IMM GRANULOCYTES NFR BLD: 0.5 %
ISTAT ACTIVATED CLOTTING TIME: 113 SECONDS (ref 74–137)
ISTAT ACTIVATED CLOTTING TIME: 137 SECONDS (ref 74–137)
LYMPHOCYTES # BLD AUTO: 1.46 X10(3) UL (ref 1–4)
LYMPHOCYTES NFR BLD AUTO: 15.9 %
MCH RBC QN AUTO: 30.2 PG (ref 26–34)
MCHC RBC AUTO-ENTMCNC: 33.2 G/DL (ref 31–37)
MCV RBC AUTO: 91.2 FL
MONOCYTES # BLD AUTO: 0.75 X10(3) UL (ref 0.1–1)
MONOCYTES NFR BLD AUTO: 8.2 %
NEUTROPHILS # BLD AUTO: 6.92 X10 (3) UL (ref 1.5–7.7)
NEUTROPHILS # BLD AUTO: 6.92 X10(3) UL (ref 1.5–7.7)
NEUTROPHILS NFR BLD AUTO: 75.3 %
P AXIS: 63 DEGREES
P-R INTERVAL: 168 MS
P2Y12 REACTION UNITS: 158 PRU
PLATELET # BLD AUTO: 221 10(3)UL (ref 150–450)
PLATELET # BLD AUTO: 241 10(3)UL (ref 150–450)
Q-T INTERVAL: 404 MS
QRS DURATION: 94 MS
QTC CALCULATION (BEZET): 483 MS
R AXIS: 37 DEGREES
RBC # BLD AUTO: 4.3 X10(6)UL
T AXIS: -38 DEGREES
VENTRICULAR RATE: 86 BPM
WBC # BLD AUTO: 9.2 X10(3) UL (ref 4–11)

## 2023-05-03 PROCEDURE — 93307 TTE W/O DOPPLER COMPLETE: CPT | Performed by: NURSE PRACTITIONER

## 2023-05-03 PROCEDURE — 99232 SBSQ HOSP IP/OBS MODERATE 35: CPT | Performed by: STUDENT IN AN ORGANIZED HEALTH CARE EDUCATION/TRAINING PROGRAM

## 2023-05-03 RX ORDER — ALPRAZOLAM 0.5 MG/1
0.5 TABLET ORAL 3 TIMES DAILY PRN
Status: DISCONTINUED | OUTPATIENT
Start: 2023-05-03 | End: 2023-05-05

## 2023-05-03 RX ORDER — METOPROLOL TARTRATE 5 MG/5ML
5 INJECTION INTRAVENOUS ONCE
Status: COMPLETED | OUTPATIENT
Start: 2023-05-03 | End: 2023-05-03

## 2023-05-03 NOTE — PLAN OF CARE
Carolynn Virk Patient Status:  Inpatient    1980 MRN [id number]   [facility] 6NE-A Attending Consuelo Cota, DO   Hosp Day # 2 PCP Jazlyn Franklin, DO     Cardiology Nocturnal APN Plan of Care     Page Received: ICU RN [id number]    Patient with heparin gtt infusing into L arm, good blood return. APTT supratherapeutic x 3. Earlier in the day, heparin infusing into R arm that had infiltrated and was not being delivered correctly with IV gtt rate increased for readings of subtherapeutic levels.        Assessment/Plan:   - Follow protocol to hold gtt then resume at decreased dose  - When drawing next PTT, please hold heparin gtt, waste 5-7 ml, flush with 20-30 ml then draw from same arm but different site distal to infusion  - Draw second PTT at same time from R arm of prior infiltrated site proximal to previous IV  - Goal to compare results given potential for contamination from both arms     Addendum 0515  - AM ptt taken in R arm where heparin is NOT running but where prior IV infiltrated, 294  - AM ppt taken in L arm where heparin IS running but held / wasted / flushed is 185  - Per RN, last rate of gtt where ptt was therapeutic was at 12 u/hr  - Per protocol, gtt will and then resume at 12 u/hr with 6 hour recheck  - RN to communicate to day shift to be cautious when drawing out of R arm as it may not be accurate  - Plan for central access     Greg Leo, [address]  2023  10:28 PM

## 2023-05-03 NOTE — PROGRESS NOTES
Cardiology    Called by nursing urgently. IABP removed with difficulty controlling right femoral site bleeding. Bleed control obtained with manual pressure to femoral artery above puncture site. Manual pressure applied for 15 minutes, followed by fem-stop with Quick Clot applied to skin puncture site. No hematoma. Palpable distal pulse in foot.     Judith Carroll MD

## 2023-05-03 NOTE — PLAN OF CARE
Assumed patient care at 0730. Patient resting in bed, balloon pump set at 1:1, no issues overnight. Plan to wean balloon pump today, heparin stopped this morning per cardiology. Pump settings changed from 1:2 for an hour, and 1:3 for half an hour after that, no issues at the bedside, patient remains CP free. Xanax given PRN. Per Cardiology APN, cangrelor paused. P2Y12 drawn. ACTs drawn from arterial sheath, remaining below <160. Balloon pump pulled around 1150. Charge RN at bedside to assist. Initial bleeding stasis achieved with manual pressure, however patient in pain and moving around - oozing could not be controlled. Call to Dr. Ramsey Castanon to the bedside. Bleed control achieved, fem stop placed for 2 hours. Removed carefully, and patient remaining flat for the proceeding two hours. HOB slowly raised, no issues at the sight. Cangrelor restarted, heparin to restart at 7 PM tonight. No chest pain this shift. Family at bedside, enjoying dinner, no complaints. Palpable pulses. Site C/D/I, no hematoma. Problem: CARDIOVASCULAR - ADULT  Goal: Maintains optimal cardiac output and hemodynamic stability  Description: INTERVENTIONS:  - Monitor vital signs, rhythm, and trends  - Monitor for bleeding, hypotension and signs of decreased cardiac output  - Evaluate effectiveness of vasoactive medications to optimize hemodynamic stability  - Monitor arterial and/or venous puncture sites for bleeding and/or hematoma  - Assess quality of pulses, skin color and temperature  - Assess for signs of decreased coronary artery perfusion - ex.  Angina  - Evaluate fluid balance, assess for edema, trend weights  Outcome: Progressing     Problem: CARDIOVASCULAR - ADULT  Goal: Absence of cardiac arrhythmias or at baseline  Description: INTERVENTIONS:  - Continuous cardiac monitoring, monitor vital signs, obtain 12 lead EKG if indicated  - Evaluate effectiveness of antiarrhythmic and heart rate control medications as ordered  - Initiate emergency measures for life threatening arrhythmias  - Monitor electrolytes and administer replacement therapy as ordered  Outcome: Progressing

## 2023-05-03 NOTE — PLAN OF CARE
Assumed pt care at [address] [place] [address]. A&Ox4, neurologically intact. RA, lung sounds clear/diminished. NSR on tele. Maintaining BP < 130. Voiding with purewick. Bedrest, HOB flat. Skin intact. R groin soft, no hematoma. IABP in place. 1:1 augmentation. CABG Friday. Heparin and cangrelor infusing. POC updated with pt, questions answered, verbalized understanding. Will continue to monitor. Problem: Diabetes/Glucose Control  Goal: Glucose maintained within prescribed range  Description: INTERVENTIONS:  - Monitor Blood Glucose as ordered  - Assess for signs and symptoms of hyperglycemia and hypoglycemia  - Administer ordered medications to maintain glucose within target range  - Assess barriers to adequate nutritional intake and initiate nutrition consult as needed  - Instruct patient on self management of diabetes  Outcome: Progressing     Problem: Patient/Family Goals  Goal: Patient/Family Long Term Goal  Description: Patient's Long Term Goal: Return home with issues fixed    Interventions:    - See additional Care Plan goals for specific interventions  Outcome: Progressing  Goal: Patient/Family Short Term Goal  Description: Patient's Short Term Goal: improve chest pain    Interventions:     - See additional Care Plan goals for specific interventions  Outcome: Progressing     Problem: CARDIOVASCULAR - ADULT  Goal: Maintains optimal cardiac output and hemodynamic stability  Description: INTERVENTIONS:  - Monitor vital signs, rhythm, and trends  - Monitor for bleeding, hypotension and signs of decreased cardiac output  - Evaluate effectiveness of vasoactive medications to optimize hemodynamic stability  - Monitor arterial and/or venous puncture sites for bleeding and/or hematoma  - Assess quality of pulses, skin color and temperature  - Assess for signs of decreased coronary artery perfusion - ex.  Angina  - Evaluate fluid balance, assess for edema, trend weights  Outcome: Progressing  Goal: Absence of cardiac arrhythmias or at baseline  Description: INTERVENTIONS:  - Continuous cardiac monitoring, monitor vital signs, obtain 12 lead EKG if indicated  - Evaluate effectiveness of antiarrhythmic and heart rate control medications as ordered  - Initiate emergency measures for life threatening arrhythmias  - Monitor electrolytes and administer replacement therapy as ordered  Outcome: Progressing     Problem: PAIN - ADULT  Goal: Verbalizes/displays adequate comfort level or patient's stated pain goal  Description: INTERVENTIONS:  - Encourage pt to monitor pain and request assistance  - Assess pain using appropriate pain scale  - Administer analgesics based on type and severity of pain and evaluate response  - Implement non-pharmacological measures as appropriate and evaluate response  - Consider cultural and social influences on pain and pain management  - Manage/alleviate anxiety  - Utilize distraction and/or relaxation techniques  - Monitor for opioid side effects  - Notify MD/LIP if interventions unsuccessful or patient reports new pain  - Anticipate increased pain with activity and pre-medicate as appropriate  Outcome: Progressing     Problem: RISK FOR INFECTION - ADULT  Goal: Absence of fever/infection during anticipated neutropenic period  Description: INTERVENTIONS  - Monitor WBC  - Administer growth factors as ordered  - Implement neutropenic guidelines  Outcome: Progressing     Problem: SAFETY ADULT - FALL  Goal: Free from fall injury  Description: INTERVENTIONS:  - Assess pt frequently for physical needs  - Identify cognitive and physical deficits and behaviors that affect risk of falls.   - [place] fall precautions as indicated by assessment.  - Educate pt/family on patient safety including physical limitations  - Instruct pt to call for assistance with activity based on assessment  - Modify environment to reduce risk of injury  - Provide assistive devices as appropriate  - Consider OT/PT consult to assist with strengthening/mobility  - Encourage toileting schedule  Outcome: Progressing     Problem: DISCHARGE PLANNING  Goal: Discharge to home or other facility with appropriate resources  Description: INTERVENTIONS:  - Identify barriers to discharge w/pt and caregiver  - Include patient/family/discharge partner in discharge planning  - Arrange for needed discharge resources and transportation as appropriate  - Identify discharge learning needs (meds, wound care, etc)  - Arrange for interpreters to assist at discharge as needed  - Consider post-discharge preferences of patient/family/discharge partner  - Complete POLST form as appropriate  - Assess patient's ability to be responsible for managing their own health  - Refer to Case Management Department for coordinating discharge planning if the patient needs post-hospital services based on physician/LIP order or complex needs related to functional status, cognitive ability or social support system  Outcome: Progressing

## 2023-05-04 LAB
ANION GAP SERPL CALC-SCNC: 7 MMOL/L (ref 0–18)
ANTIBODY SCREEN: NEGATIVE
APTT PPP: 58.5 SECONDS (ref 23.3–35.6)
APTT PPP: 69.2 SECONDS (ref 23.3–35.6)
APTT PPP: 77.1 SECONDS (ref 23.3–35.6)
ATRIAL RATE: 67 BPM
BASOPHILS # BLD AUTO: 0.01 X10(3) UL (ref 0–0.2)
BASOPHILS NFR BLD AUTO: 0.1 %
BILIRUB UR QL STRIP.AUTO: NEGATIVE
BUN BLD-MCNC: 13 MG/DL (ref 7–18)
BUN BLD-MCNC: 14 MG/DL (ref 7–18)
CALCIUM BLD-MCNC: 9 MG/DL (ref 8.5–10.1)
CALCIUM BLD-MCNC: 9.3 MG/DL (ref 8.5–10.1)
CHLORIDE SERPL-SCNC: 106 MMOL/L (ref 98–112)
CHLORIDE SERPL-SCNC: 107 MMOL/L (ref 98–112)
CLARITY UR REFRACT.AUTO: CLEAR
CO2 SERPL-SCNC: 25 MMOL/L (ref 21–32)
CO2 SERPL-SCNC: 29 MMOL/L (ref 21–32)
COLOR UR AUTO: YELLOW
CREAT BLD-MCNC: 0.76 MG/DL
CREAT BLD-MCNC: 0.78 MG/DL
EOSINOPHIL # BLD AUTO: 0 X10(3) UL (ref 0–0.7)
EOSINOPHIL NFR BLD AUTO: 0 %
ERYTHROCYTE [DISTWIDTH] IN BLOOD BY AUTOMATED COUNT: 13.8 %
ERYTHROCYTE [DISTWIDTH] IN BLOOD BY AUTOMATED COUNT: 14.3 %
GFR SERPLBLD BASED ON 1.73 SQ M-ARVRAT: 102 ML/MIN/1.73M2 (ref 60–?)
GFR SERPLBLD BASED ON 1.73 SQ M-ARVRAT: 98 ML/MIN/1.73M2 (ref 60–?)
GLUCOSE BLD-MCNC: 105 MG/DL (ref 70–99)
GLUCOSE BLD-MCNC: 110 MG/DL (ref 70–99)
GLUCOSE UR STRIP.AUTO-MCNC: NEGATIVE MG/DL
HCT VFR BLD AUTO: 36.5 %
HCT VFR BLD AUTO: 36.8 %
HGB BLD-MCNC: 11.9 G/DL
HGB BLD-MCNC: 12.3 G/DL
IMM GRANULOCYTES # BLD AUTO: 0.05 X10(3) UL (ref 0–1)
IMM GRANULOCYTES NFR BLD: 0.6 %
INR BLD: 1.1 (ref 0.85–1.16)
KETONES UR STRIP.AUTO-MCNC: NEGATIVE MG/DL
LEUKOCYTE ESTERASE UR QL STRIP.AUTO: NEGATIVE
LYMPHOCYTES # BLD AUTO: 1.81 X10(3) UL (ref 1–4)
LYMPHOCYTES NFR BLD AUTO: 21.9 %
MAGNESIUM SERPL-MCNC: 2.2 MG/DL (ref 1.6–2.6)
MCH RBC QN AUTO: 30.4 PG (ref 26–34)
MCH RBC QN AUTO: 30.5 PG (ref 26–34)
MCHC RBC AUTO-ENTMCNC: 32.3 G/DL (ref 31–37)
MCHC RBC AUTO-ENTMCNC: 33.7 G/DL (ref 31–37)
MCV RBC AUTO: 90.3 FL
MCV RBC AUTO: 94.4 FL
MONOCYTES # BLD AUTO: 0.75 X10(3) UL (ref 0.1–1)
MONOCYTES NFR BLD AUTO: 9.1 %
NEUTROPHILS # BLD AUTO: 5.64 X10 (3) UL (ref 1.5–7.7)
NEUTROPHILS # BLD AUTO: 5.64 X10(3) UL (ref 1.5–7.7)
NEUTROPHILS NFR BLD AUTO: 68.3 %
NITRITE UR QL STRIP.AUTO: NEGATIVE
OSMOLALITY SERPL CALC.SUM OF ELEC: 287 MOSM/KG (ref 275–295)
P AXIS: 58 DEGREES
P-R INTERVAL: 170 MS
PH UR STRIP.AUTO: 6 [PH] (ref 5–8)
PLATELET # BLD AUTO: 249 10(3)UL (ref 150–450)
PLATELET # BLD AUTO: 261 10(3)UL (ref 150–450)
POTASSIUM SERPL-SCNC: 3.7 MMOL/L (ref 3.5–5.1)
POTASSIUM SERPL-SCNC: 4.1 MMOL/L (ref 3.5–5.1)
POTASSIUM SERPL-SCNC: 4.1 MMOL/L (ref 3.5–5.1)
PROT UR STRIP.AUTO-MCNC: NEGATIVE MG/DL
PROTHROMBIN TIME: 14.2 SECONDS (ref 11.6–14.8)
Q-T INTERVAL: 428 MS
QRS DURATION: 96 MS
QTC CALCULATION (BEZET): 452 MS
R AXIS: 33 DEGREES
RBC # BLD AUTO: 3.9 X10(6)UL
RBC # BLD AUTO: 4.04 X10(6)UL
RBC UR QL AUTO: NEGATIVE
RH BLOOD TYPE: POSITIVE
SODIUM SERPL-SCNC: 138 MMOL/L (ref 136–145)
SODIUM SERPL-SCNC: 139 MMOL/L (ref 136–145)
SP GR UR STRIP.AUTO: 1.02 (ref 1–1.03)
T AXIS: -23 DEGREES
UROBILINOGEN UR STRIP.AUTO-MCNC: <2 MG/DL
VENTRICULAR RATE: 67 BPM
WBC # BLD AUTO: 8.3 X10(3) UL (ref 4–11)
WBC # BLD AUTO: 9.6 X10(3) UL (ref 4–11)

## 2023-05-04 PROCEDURE — 99232 SBSQ HOSP IP/OBS MODERATE 35: CPT | Performed by: STUDENT IN AN ORGANIZED HEALTH CARE EDUCATION/TRAINING PROGRAM

## 2023-05-04 RX ORDER — POTASSIUM CHLORIDE 20 MEQ/1
40 TABLET, EXTENDED RELEASE ORAL ONCE
Status: COMPLETED | OUTPATIENT
Start: 2023-05-04 | End: 2023-05-04

## 2023-05-04 RX ORDER — SODIUM CHLORIDE 9 MG/ML
INJECTION, SOLUTION INTRAVENOUS
Status: COMPLETED | OUTPATIENT
Start: 2023-05-05 | End: 2023-05-05

## 2023-05-04 RX ORDER — HYDRALAZINE HYDROCHLORIDE 20 MG/ML
10 INJECTION INTRAMUSCULAR; INTRAVENOUS EVERY 4 HOURS PRN
Status: DISCONTINUED | OUTPATIENT
Start: 2023-05-04 | End: 2023-05-05

## 2023-05-04 NOTE — PROGRESS NOTES
Seen by Dr. Jaci Rincon, pts  and his parents at bedside, pt resting, for CABG tomorrow, 8am incision,  aware to be here at 6am. Stop cangrelor at 4am, stop heparin  On call to OR. Met with patient and  to discuss pre op teaching, post op expectations, discharge planning. Discussed roles of CM/SW, PT/OT, Cardiac rehab in education and recovery. All questions answered, binder given. [facility], she has planned to take the rest of the year off. Discussed typical post op and at home recovery, her  and parents as well as many others can assist at home. 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 All questions answered, will follow up post op.   Tanvir Jack RN  Clinical Coordinator  CV Surgery

## 2023-05-04 NOTE — PLAN OF CARE
Pt has been hemodynamically stable throughout the day. R groin sited soft but ecchymotic and tender; palpable pedal pulse present. Pt has denied chest pain throughout the day. Up to the chair without difficulty with nursing staff and able to walk a short distance as well. Pt remains on therapeutic heparin and cangrelor gtts. Reviewed surgery and related expectations of the surgery with pt and . Consents signed. Labs drawn. Awaiting urine sample and chlorhexidine baths.

## 2023-05-05 ENCOUNTER — APPOINTMENT (OUTPATIENT)
Dept: GENERAL RADIOLOGY | Facility: HOSPITAL | Age: 43
End: 2023-05-05
Attending: PHYSICIAN ASSISTANT
Payer: COMMERCIAL

## 2023-05-05 ENCOUNTER — ANESTHESIA (OUTPATIENT)
Dept: CARDIAC SURGERY | Facility: HOSPITAL | Age: 43
End: 2023-05-05
Payer: COMMERCIAL

## 2023-05-05 PROBLEM — Z95.5 PRESENCE OF DRUG COATED STENT IN LEFT CIRCUMFLEX CORONARY ARTERY: Chronic | Status: ACTIVE | Noted: 2023-05-05

## 2023-05-05 PROBLEM — Z95.1 S/P CABG X 3: Status: ACTIVE | Noted: 2023-05-05

## 2023-05-05 PROBLEM — Z95.5 PRESENCE OF DRUG COATED STENT IN ANTERIOR DESCENDING BRANCH OF LEFT CORONARY ARTERY: Chronic | Status: ACTIVE | Noted: 2023-05-05

## 2023-05-05 LAB
APTT PPP: 26.8 SECONDS (ref 23.3–35.6)
APTT PPP: 30.6 SECONDS (ref 23.3–35.6)
APTT PPP: 51.9 SECONDS (ref 23.3–35.6)
ARTERIAL PATENCY WRIST A: POSITIVE
ATRIAL RATE: 90 BPM
BASE EXCESS BLD CALC-SCNC: -5 MMOL/L
BASE EXCESS BLD CALC-SCNC: 0 MMOL/L
BASE EXCESS BLDA CALC-SCNC: -1.9 MMOL/L (ref ?–2)
BASE EXCESS BLDA CALC-SCNC: -2.7 MMOL/L (ref ?–2)
BASE EXCESS BLDA CALC-SCNC: -3 MMOL/L (ref ?–30)
BASE EXCESS BLDMV CALC-SCNC: -1 MMOL/L (ref ?–30)
BASE EXCESS BLDMV CALC-SCNC: 0 MMOL/L (ref ?–30)
BASE EXCESS BLDMV CALC-SCNC: 1 MMOL/L (ref ?–30)
BODY TEMPERATURE: 98.6 F
BODY TEMPERATURE: 98.6 F
BUN BLD-MCNC: 13 MG/DL (ref 7–18)
CA-I BLD-SCNC: 1.18 MMOL/L (ref 0.95–1.32)
CA-I BLD-SCNC: 1.22 MMOL/L (ref 1.12–1.32)
CA-I BLD-SCNC: 1.23 MMOL/L (ref 0.95–1.32)
CA-I BLD-SCNC: 1.32 MMOL/L (ref 1.12–1.32)
CA-I BLDA-SCNC: 1.35 MMOL/L (ref 1.12–1.32)
CA-I BLDMV-SCNC: 1.13 MMOL/L (ref 1.12–1.32)
CA-I BLDMV-SCNC: 1.15 MMOL/L (ref 1.12–1.32)
CA-I BLDMV-SCNC: 1.15 MMOL/L (ref 1.12–1.32)
CALCIUM BLD-MCNC: 8.6 MG/DL (ref 8.5–10.1)
CHLORIDE SERPL-SCNC: 112 MMOL/L (ref 98–112)
CO2 BLD-SCNC: 22 MMOL/L (ref 22–32)
CO2 BLD-SCNC: 26 MMOL/L (ref 22–32)
CO2 BLDA-SCNC: 23 MMOL/L (ref 22–32)
CO2 BLDMV-SCNC: 26 MMOL/L (ref 22–32)
CO2 BLDMV-SCNC: 26 MMOL/L (ref 22–32)
CO2 BLDMV-SCNC: 28 MMOL/L (ref 22–32)
CO2 SERPL-SCNC: 22 MMOL/L (ref 21–32)
COHGB MFR BLD: 1.9 % SAT (ref 0–3)
COHGB MFR BLD: 2 % SAT (ref 0–3)
CREAT BLD-MCNC: 0.92 MG/DL
ERYTHROCYTE [DISTWIDTH] IN BLOOD BY AUTOMATED COUNT: 14.2 %
FIBRINOGEN PPP-MCNC: 345 MG/DL (ref 180–480)
FIBRINOGEN PPP-MCNC: 381 MG/DL (ref 180–480)
FIO2: 30 %
FIO2: 40 %
GFR SERPLBLD BASED ON 1.73 SQ M-ARVRAT: 81 ML/MIN/1.73M2 (ref 60–?)
GLUCOSE BLD-MCNC: 109 MG/DL (ref 70–99)
GLUCOSE BLD-MCNC: 124 MG/DL (ref 70–99)
GLUCOSE BLD-MCNC: 127 MG/DL (ref 70–99)
GLUCOSE BLD-MCNC: 138 MG/DL (ref 70–99)
GLUCOSE BLD-MCNC: 157 MG/DL (ref 70–99)
GLUCOSE BLD-MCNC: 159 MG/DL (ref 70–99)
GLUCOSE BLD-MCNC: 161 MG/DL (ref 70–99)
GLUCOSE BLD-MCNC: 163 MG/DL (ref 70–99)
GLUCOSE BLD-MCNC: 165 MG/DL (ref 70–99)
GLUCOSE BLD-MCNC: 165 MG/DL (ref 70–99)
GLUCOSE BLD-MCNC: 172 MG/DL (ref 70–99)
GLUCOSE BLD-MCNC: 173 MG/DL (ref 70–99)
GLUCOSE BLD-MCNC: 191 MG/DL (ref 70–99)
GLUCOSE BLD-MCNC: 203 MG/DL (ref 70–99)
GLUCOSE BLD-MCNC: 204 MG/DL (ref 70–99)
GLUCOSE BLD-MCNC: 206 MG/DL (ref 70–99)
GLUCOSE BLDA-MCNC: 245 MG/DL (ref 70–99)
HCO3 BLD-SCNC: 20.7 MEQ/L
HCO3 BLD-SCNC: 25.2 MEQ/L
HCO3 BLDA-SCNC: 22.3 MEQ/L (ref 22–26)
HCO3 BLDA-SCNC: 22.8 MEQ/L (ref 21–27)
HCO3 BLDA-SCNC: 23.4 MEQ/L (ref 21–27)
HCO3 BLDMV-SCNC: 24.9 MEQ/L (ref 22–26)
HCO3 BLDMV-SCNC: 25 MEQ/L (ref 22–26)
HCO3 BLDMV-SCNC: 26.2 MEQ/L (ref 22–26)
HCT VFR BLD AUTO: 35.6 %
HCT VFR BLD CALC: 31 %
HCT VFR BLD CALC: 34 %
HCT VFR BLDA CALC: 24 %
HCT VFR BLDMV CALC: 21 %
HCT VFR BLDMV CALC: 23 %
HCT VFR BLDMV CALC: 24 %
HGB BLD-MCNC: 10.2 G/DL
HGB BLD-MCNC: 10.8 G/DL
HGB BLD-MCNC: 11.7 G/DL
INR BLD: 1.27 (ref 0.85–1.16)
INR BLD: 1.36 (ref 0.85–1.16)
INSPIRATION SETTING TIME VENT: 0.9 %
ISTAT ACTIVATED CLOTTING TIME: 131 SECONDS (ref 74–137)
ISTAT ACTIVATED CLOTTING TIME: 143 SECONDS (ref 74–137)
ISTAT ACTIVATED CLOTTING TIME: 528 SECONDS (ref 74–137)
ISTAT ACTIVATED CLOTTING TIME: 666 SECONDS (ref 74–137)
ISTAT ACTIVATED CLOTTING TIME: 732 SECONDS (ref 74–137)
ISTAT ACTIVATED CLOTTING TIME: 780 SECONDS (ref 74–137)
ISTAT PATIENT TEMPERATURE: 32 DEGREE
LACTATE BLD-SCNC: 1.3 MMOL/L (ref 0.5–2)
LACTATE BLD-SCNC: 1.5 MMOL/L (ref 0.5–2)
MAGNESIUM SERPL-MCNC: 2.6 MG/DL (ref 1.6–2.6)
MCH RBC QN AUTO: 30.5 PG (ref 26–34)
MCHC RBC AUTO-ENTMCNC: 32.9 G/DL (ref 31–37)
MCV RBC AUTO: 93 FL
METHGB MFR BLD: 0.4 % SAT (ref 0.4–1.5)
METHGB MFR BLD: 0.6 % SAT (ref 0.4–1.5)
OXYHGB MFR BLDA: 95.7 % (ref 92–100)
OXYHGB MFR BLDA: 96.3 % (ref 92–100)
P AXIS: 56 DEGREES
P-R INTERVAL: 206 MS
PCO2 BLD: 39.9 MMHG
PCO2 BLD: 41.9 MMHG
PCO2 BLDA: 26 MM HG (ref 35–45)
PCO2 BLDA: 35 MM HG (ref 35–45)
PCO2 BLDA: 38.2 MMHG (ref 35–45)
PCO2 BLDMV: 34.3 MMHG (ref 38–50)
PCO2 BLDMV: 36.5 MMHG (ref 38–50)
PCO2 BLDMV: 37.6 MMHG (ref 38–50)
PEEP: 5 CM H2O
PEEP: 5 CM H2O
PH BLD: 7.32 [PH]
PH BLD: 7.39 [PH]
PH BLDA: 7.38 [PH] (ref 7.35–7.45)
PH BLDA: 7.4 [PH] (ref 7.35–7.45)
PH BLDA: 7.5 [PH] (ref 7.35–7.45)
PH BLDMV: 7.41 [PH] (ref 7.32–7.43)
PH BLDMV: 7.44 [PH] (ref 7.32–7.43)
PH BLDMV: 7.45 [PH] (ref 7.32–7.43)
PLATELET # BLD AUTO: 240 10(3)UL (ref 150–450)
PLATELET # BLD AUTO: 267 10(3)UL (ref 150–450)
PO2 BLD: 333 MMHG
PO2 BLD: 89 MMHG
PO2 BLDA: 160 MMHG (ref 80–105)
PO2 BLDA: 75 MM HG (ref 80–100)
PO2 BLDA: 87 MM HG (ref 80–100)
PO2 BLDMV: <70 MMHG (ref 35–40)
POTASSIUM BLD-SCNC: 4.1 MMOL/L (ref 3.6–5.1)
POTASSIUM BLD-SCNC: 4.2 MMOL/L (ref 3.6–5.1)
POTASSIUM BLD-SCNC: 4.3 MMOL/L (ref 3.6–5.1)
POTASSIUM BLD-SCNC: 4.3 MMOL/L (ref 3.6–5.1)
POTASSIUM SERPL-SCNC: 4.2 MMOL/L (ref 3.5–5.1)
PROTHROMBIN TIME: 15.8 SECONDS (ref 11.6–14.8)
PROTHROMBIN TIME: 16.7 SECONDS (ref 11.6–14.8)
Q-T INTERVAL: 374 MS
QRS DURATION: 104 MS
QTC CALCULATION (BEZET): 457 MS
R AXIS: 57 DEGREES
RBC # BLD AUTO: 3.83 X10(6)UL
SAO2 % BLD: 100 %
SAO2 % BLD: 96 %
SAO2 % BLDA: 99 % (ref 92–100)
SAO2 % BLDMV: 69 % (ref 60–85)
SAO2 % BLDMV: 81 % (ref 60–85)
SAO2 % BLDMV: 84 % (ref 60–85)
SODIUM BLD-SCNC: 136 MMOL/L (ref 135–145)
SODIUM BLD-SCNC: 137 MMOL/L (ref 135–145)
SODIUM BLD-SCNC: 138 MMOL/L (ref 136–145)
SODIUM BLD-SCNC: 139 MMOL/L (ref 136–145)
SODIUM BLDA-SCNC: 136 MMOL/L (ref 136–145)
SODIUM BLDA-SCNC: 5.5 MMOL/L (ref 3.6–5.1)
SODIUM BLDMV-SCNC: 134 MMOL/L (ref 136–145)
SODIUM BLDMV-SCNC: 5.3 MMOL/L (ref 3.6–5.1)
SODIUM BLDMV-SCNC: 5.7 MMOL/L (ref 3.6–5.1)
SODIUM BLDMV-SCNC: 6.5 MMOL/L (ref 3.6–5.1)
SODIUM SERPL-SCNC: 139 MMOL/L (ref 136–145)
T AXIS: 1 DEGREES
TIDAL VOLUME: 600 ML
TIDAL VOLUME: 600 ML
VENT RATE: 10 /MIN
VENT RATE: 12 /MIN
VENTRICULAR RATE: 90 BPM
WBC # BLD AUTO: 17 X10(3) UL (ref 4–11)

## 2023-05-05 PROCEDURE — B24BZZ4 ULTRASONOGRAPHY OF HEART WITH AORTA, TRANSESOPHAGEAL: ICD-10-PCS | Performed by: ANESTHESIOLOGY

## 2023-05-05 PROCEDURE — S0028 INJECTION, FAMOTIDINE, 20 MG: HCPCS | Performed by: ANESTHESIOLOGY

## 2023-05-05 PROCEDURE — 36430 TRANSFUSION BLD/BLD COMPNT: CPT | Performed by: ANESTHESIOLOGY

## 2023-05-05 PROCEDURE — 93312 ECHO TRANSESOPHAGEAL: CPT | Performed by: ANESTHESIOLOGY

## 2023-05-05 PROCEDURE — 76942 ECHO GUIDE FOR BIOPSY: CPT | Performed by: ANESTHESIOLOGY

## 2023-05-05 PROCEDURE — 99232 SBSQ HOSP IP/OBS MODERATE 35: CPT | Performed by: STUDENT IN AN ORGANIZED HEALTH CARE EDUCATION/TRAINING PROGRAM

## 2023-05-05 PROCEDURE — 71045 X-RAY EXAM CHEST 1 VIEW: CPT | Performed by: PHYSICIAN ASSISTANT

## 2023-05-05 PROCEDURE — 5A1221Z PERFORMANCE OF CARDIAC OUTPUT, CONTINUOUS: ICD-10-PCS | Performed by: THORACIC SURGERY (CARDIOTHORACIC VASCULAR SURGERY)

## 2023-05-05 PROCEDURE — 021109W BYPASS CORONARY ARTERY, TWO ARTERIES FROM AORTA WITH AUTOLOGOUS VENOUS TISSUE, OPEN APPROACH: ICD-10-PCS | Performed by: THORACIC SURGERY (CARDIOTHORACIC VASCULAR SURGERY)

## 2023-05-05 PROCEDURE — 02100Z9 BYPASS CORONARY ARTERY, ONE ARTERY FROM LEFT INTERNAL MAMMARY, OPEN APPROACH: ICD-10-PCS | Performed by: THORACIC SURGERY (CARDIOTHORACIC VASCULAR SURGERY)

## 2023-05-05 PROCEDURE — 30233N1 TRANSFUSION OF NONAUTOLOGOUS RED BLOOD CELLS INTO PERIPHERAL VEIN, PERCUTANEOUS APPROACH: ICD-10-PCS | Performed by: THORACIC SURGERY (CARDIOTHORACIC VASCULAR SURGERY)

## 2023-05-05 PROCEDURE — 06BM4ZZ EXCISION OF RIGHT FEMORAL VEIN, PERCUTANEOUS ENDOSCOPIC APPROACH: ICD-10-PCS | Performed by: THORACIC SURGERY (CARDIOTHORACIC VASCULAR SURGERY)

## 2023-05-05 DEVICE — IMPLANTABLE DEVICE
Type: IMPLANTABLE DEVICE | Site: CHEST | Status: FUNCTIONAL
Brand: STERNALOCK® BLU SYSTEM

## 2023-05-05 RX ORDER — PHYTONADIONE 10 MG/ML
INJECTION, EMULSION INTRAMUSCULAR; INTRAVENOUS; SUBCUTANEOUS AS NEEDED
Status: DISCONTINUED | OUTPATIENT
Start: 2023-05-05 | End: 2023-05-05 | Stop reason: SURG

## 2023-05-05 RX ORDER — CEFAZOLIN SODIUM/WATER 2 G/20 ML
2 SYRINGE (ML) INTRAVENOUS EVERY 8 HOURS
Status: COMPLETED | OUTPATIENT
Start: 2023-05-05 | End: 2023-05-07

## 2023-05-05 RX ORDER — ONDANSETRON 2 MG/ML
4 INJECTION INTRAMUSCULAR; INTRAVENOUS EVERY 6 HOURS PRN
Status: DISCONTINUED | OUTPATIENT
Start: 2023-05-05 | End: 2023-05-10

## 2023-05-05 RX ORDER — ASPIRIN 81 MG/1
81 TABLET ORAL DAILY
Status: DISCONTINUED | OUTPATIENT
Start: 2023-05-06 | End: 2023-05-10

## 2023-05-05 RX ORDER — METHYLPREDNISOLONE SODIUM SUCCINATE 500 MG/8ML
INJECTION INTRAMUSCULAR; INTRAVENOUS AS NEEDED
Status: DISCONTINUED | OUTPATIENT
Start: 2023-05-05 | End: 2023-05-05 | Stop reason: SURG

## 2023-05-05 RX ORDER — ALBUMIN, HUMAN INJ 5% 5 %
12.5 SOLUTION INTRAVENOUS ONCE AS NEEDED
Status: DISCONTINUED | OUTPATIENT
Start: 2023-05-05 | End: 2023-05-10

## 2023-05-05 RX ORDER — MIDAZOLAM HYDROCHLORIDE 1 MG/ML
1 INJECTION INTRAMUSCULAR; INTRAVENOUS EVERY 30 MIN PRN
Status: DISCONTINUED | OUTPATIENT
Start: 2023-05-05 | End: 2023-05-07

## 2023-05-05 RX ORDER — NICOTINE POLACRILEX 4 MG
15 LOZENGE BUCCAL
Status: DISCONTINUED | OUTPATIENT
Start: 2023-05-05 | End: 2023-05-10

## 2023-05-05 RX ORDER — BISACODYL 10 MG
10 SUPPOSITORY, RECTAL RECTAL
Status: DISCONTINUED | OUTPATIENT
Start: 2023-05-05 | End: 2023-05-10

## 2023-05-05 RX ORDER — DOBUTAMINE HYDROCHLORIDE 100 MG/100ML
INJECTION INTRAVENOUS CONTINUOUS PRN
Status: DISCONTINUED | OUTPATIENT
Start: 2023-05-05 | End: 2023-05-05 | Stop reason: SURG

## 2023-05-05 RX ORDER — MELATONIN
3 NIGHTLY PRN
Status: DISCONTINUED | OUTPATIENT
Start: 2023-05-05 | End: 2023-05-10

## 2023-05-05 RX ORDER — DIPHENHYDRAMINE HYDROCHLORIDE 50 MG/ML
INJECTION INTRAMUSCULAR; INTRAVENOUS AS NEEDED
Status: DISCONTINUED | OUTPATIENT
Start: 2023-05-05 | End: 2023-05-05 | Stop reason: SURG

## 2023-05-05 RX ORDER — HEPARIN SODIUM 1000 [USP'U]/ML
INJECTION, SOLUTION INTRAVENOUS; SUBCUTANEOUS AS NEEDED
Status: DISCONTINUED | OUTPATIENT
Start: 2023-05-05 | End: 2023-05-05 | Stop reason: SURG

## 2023-05-05 RX ORDER — PANTOPRAZOLE SODIUM 40 MG/1
40 TABLET, DELAYED RELEASE ORAL
Status: DISCONTINUED | OUTPATIENT
Start: 2023-05-06 | End: 2023-05-10

## 2023-05-05 RX ORDER — ALBUMIN, HUMAN INJ 5% 5 %
12.5 SOLUTION INTRAVENOUS ONCE
Status: COMPLETED | OUTPATIENT
Start: 2023-05-05 | End: 2023-05-05

## 2023-05-05 RX ORDER — PROTAMINE SULFATE 10 MG/ML
INJECTION, SOLUTION INTRAVENOUS AS NEEDED
Status: DISCONTINUED | OUTPATIENT
Start: 2023-05-05 | End: 2023-05-05 | Stop reason: SURG

## 2023-05-05 RX ORDER — ACETAMINOPHEN 10 MG/ML
1000 INJECTION, SOLUTION INTRAVENOUS EVERY 6 HOURS
Status: COMPLETED | OUTPATIENT
Start: 2023-05-05 | End: 2023-05-06

## 2023-05-05 RX ORDER — MAGNESIUM SULFATE HEPTAHYDRATE 40 MG/ML
2 INJECTION, SOLUTION INTRAVENOUS AS NEEDED
Status: DISCONTINUED | OUTPATIENT
Start: 2023-05-05 | End: 2023-05-10

## 2023-05-05 RX ORDER — MIDAZOLAM HYDROCHLORIDE 1 MG/ML
INJECTION INTRAMUSCULAR; INTRAVENOUS AS NEEDED
Status: DISCONTINUED | OUTPATIENT
Start: 2023-05-05 | End: 2023-05-05 | Stop reason: SURG

## 2023-05-05 RX ORDER — SUFENTANIL CITRATE 50 UG/ML
INJECTION EPIDURAL; INTRAVENOUS AS NEEDED
Status: DISCONTINUED | OUTPATIENT
Start: 2023-05-05 | End: 2023-05-05 | Stop reason: SURG

## 2023-05-05 RX ORDER — CHLORHEXIDINE GLUCONATE 0.12 MG/ML
15 RINSE ORAL
Status: DISCONTINUED | OUTPATIENT
Start: 2023-05-05 | End: 2023-05-06

## 2023-05-05 RX ORDER — ROCURONIUM BROMIDE 10 MG/ML
INJECTION, SOLUTION INTRAVENOUS AS NEEDED
Status: DISCONTINUED | OUTPATIENT
Start: 2023-05-05 | End: 2023-05-05 | Stop reason: SURG

## 2023-05-05 RX ORDER — ALBUMIN, HUMAN INJ 5% 5 %
SOLUTION INTRAVENOUS
Status: DISPENSED
Start: 2023-05-05 | End: 2023-05-06

## 2023-05-05 RX ORDER — DEXTROSE MONOHYDRATE 25 G/50ML
50 INJECTION, SOLUTION INTRAVENOUS
Status: DISCONTINUED | OUTPATIENT
Start: 2023-05-05 | End: 2023-05-10

## 2023-05-05 RX ORDER — DEXMEDETOMIDINE HYDROCHLORIDE 4 UG/ML
INJECTION, SOLUTION INTRAVENOUS CONTINUOUS
Status: DISCONTINUED | OUTPATIENT
Start: 2023-05-05 | End: 2023-05-06

## 2023-05-05 RX ORDER — DOBUTAMINE HYDROCHLORIDE 200 MG/100ML
INJECTION INTRAVENOUS CONTINUOUS PRN
Status: DISCONTINUED | OUTPATIENT
Start: 2023-05-05 | End: 2023-05-07

## 2023-05-05 RX ORDER — FAMOTIDINE 10 MG/ML
INJECTION, SOLUTION INTRAVENOUS AS NEEDED
Status: DISCONTINUED | OUTPATIENT
Start: 2023-05-05 | End: 2023-05-05 | Stop reason: SURG

## 2023-05-05 RX ORDER — MORPHINE SULFATE 2 MG/ML
2 INJECTION, SOLUTION INTRAMUSCULAR; INTRAVENOUS EVERY 2 HOUR PRN
Status: DISCONTINUED | OUTPATIENT
Start: 2023-05-05 | End: 2023-05-10

## 2023-05-05 RX ORDER — POTASSIUM CHLORIDE 29.8 MG/ML
40 INJECTION INTRAVENOUS AS NEEDED
Status: DISCONTINUED | OUTPATIENT
Start: 2023-05-05 | End: 2023-05-10

## 2023-05-05 RX ORDER — MORPHINE SULFATE 4 MG/ML
4 INJECTION, SOLUTION INTRAMUSCULAR; INTRAVENOUS EVERY 2 HOUR PRN
Status: DISCONTINUED | OUTPATIENT
Start: 2023-05-05 | End: 2023-05-10

## 2023-05-05 RX ORDER — DEXTROSE AND SODIUM CHLORIDE 5; .45 G/100ML; G/100ML
INJECTION, SOLUTION INTRAVENOUS CONTINUOUS
Status: DISCONTINUED | OUTPATIENT
Start: 2023-05-05 | End: 2023-05-06

## 2023-05-05 RX ORDER — NITROGLYCERIN 20 MG/100ML
INJECTION INTRAVENOUS CONTINUOUS PRN
Status: DISCONTINUED | OUTPATIENT
Start: 2023-05-05 | End: 2023-05-07

## 2023-05-05 RX ORDER — NICOTINE POLACRILEX 4 MG
30 LOZENGE BUCCAL
Status: DISCONTINUED | OUTPATIENT
Start: 2023-05-05 | End: 2023-05-10

## 2023-05-05 RX ORDER — SODIUM PHOSPHATE, DIBASIC AND SODIUM PHOSPHATE, MONOBASIC 7; 19 G/133ML; G/133ML
1 ENEMA RECTAL ONCE AS NEEDED
Status: DISCONTINUED | OUTPATIENT
Start: 2023-05-05 | End: 2023-05-10

## 2023-05-05 RX ORDER — POTASSIUM CHLORIDE 14.9 MG/ML
20 INJECTION INTRAVENOUS AS NEEDED
Status: DISCONTINUED | OUTPATIENT
Start: 2023-05-05 | End: 2023-05-10

## 2023-05-05 RX ORDER — NALOXONE HYDROCHLORIDE 0.4 MG/ML
0.08 INJECTION, SOLUTION INTRAMUSCULAR; INTRAVENOUS; SUBCUTANEOUS
Status: DISCONTINUED | OUTPATIENT
Start: 2023-05-05 | End: 2023-05-07

## 2023-05-05 RX ORDER — MAGNESIUM SULFATE 1 G/100ML
1 INJECTION INTRAVENOUS AS NEEDED
Status: DISCONTINUED | OUTPATIENT
Start: 2023-05-05 | End: 2023-05-10

## 2023-05-05 RX ORDER — IPRATROPIUM BROMIDE AND ALBUTEROL SULFATE 2.5; .5 MG/3ML; MG/3ML
3 SOLUTION RESPIRATORY (INHALATION) EVERY 4 HOURS PRN
Status: DISCONTINUED | OUTPATIENT
Start: 2023-05-05 | End: 2023-05-10

## 2023-05-05 RX ORDER — SODIUM CHLORIDE 9 MG/ML
INJECTION, SOLUTION INTRAVENOUS CONTINUOUS
Status: DISCONTINUED | OUTPATIENT
Start: 2023-05-05 | End: 2023-05-07

## 2023-05-05 RX ORDER — SENNOSIDES 8.6 MG
17.2 TABLET ORAL NIGHTLY PRN
Status: DISCONTINUED | OUTPATIENT
Start: 2023-05-05 | End: 2023-05-10

## 2023-05-05 RX ORDER — DIPHENHYDRAMINE HYDROCHLORIDE 50 MG/ML
12.5 INJECTION INTRAMUSCULAR; INTRAVENOUS EVERY 4 HOURS PRN
Status: DISCONTINUED | OUTPATIENT
Start: 2023-05-05 | End: 2023-05-07

## 2023-05-05 RX ORDER — CEFAZOLIN SODIUM/WATER 2 G/20 ML
SYRINGE (ML) INTRAVENOUS AS NEEDED
Status: DISCONTINUED | OUTPATIENT
Start: 2023-05-05 | End: 2023-05-05 | Stop reason: SURG

## 2023-05-05 RX ORDER — NITROGLYCERIN 20 MG/100ML
INJECTION INTRAVENOUS CONTINUOUS PRN
Status: DISCONTINUED | OUTPATIENT
Start: 2023-05-05 | End: 2023-05-05 | Stop reason: SURG

## 2023-05-05 RX ORDER — VANCOMYCIN 1.75 GRAM/500 ML IN 0.9 % SODIUM CHLORIDE INTRAVENOUS
15 EVERY 12 HOURS
Status: COMPLETED | OUTPATIENT
Start: 2023-05-05 | End: 2023-05-06

## 2023-05-05 RX ORDER — POLYETHYLENE GLYCOL 3350 17 G/17G
17 POWDER, FOR SOLUTION ORAL DAILY PRN
Status: DISCONTINUED | OUTPATIENT
Start: 2023-05-05 | End: 2023-05-10

## 2023-05-05 RX ADMIN — DIPHENHYDRAMINE HYDROCHLORIDE 50 MG: 50 INJECTION INTRAMUSCULAR; INTRAVENOUS at 07:07:00

## 2023-05-05 RX ADMIN — MIDAZOLAM HYDROCHLORIDE 7 MG: 1 INJECTION INTRAMUSCULAR; INTRAVENOUS at 07:16:00

## 2023-05-05 RX ADMIN — SODIUM CHLORIDE: 9 INJECTION, SOLUTION INTRAVENOUS at 12:24:00

## 2023-05-05 RX ADMIN — ROCURONIUM BROMIDE 50 MG: 10 INJECTION, SOLUTION INTRAVENOUS at 11:15:00

## 2023-05-05 RX ADMIN — SODIUM CHLORIDE: 9 INJECTION, SOLUTION INTRAVENOUS at 07:05:00

## 2023-05-05 RX ADMIN — HEPARIN SODIUM 5000 UNITS: 1000 INJECTION, SOLUTION INTRAVENOUS; SUBCUTANEOUS at 09:05:00

## 2023-05-05 RX ADMIN — SUFENTANIL CITRATE 75 MCG: 50 INJECTION EPIDURAL; INTRAVENOUS at 08:10:00

## 2023-05-05 RX ADMIN — FAMOTIDINE 20 MG: 10 INJECTION, SOLUTION INTRAVENOUS at 07:07:00

## 2023-05-05 RX ADMIN — DOBUTAMINE HYDROCHLORIDE 5 MCG/KG/MIN: 100 INJECTION INTRAVENOUS at 10:49:00

## 2023-05-05 RX ADMIN — NITROGLYCERIN 5 MCG/MIN: 20 INJECTION INTRAVENOUS at 12:08:00

## 2023-05-05 RX ADMIN — METHYLPREDNISOLONE SODIUM SUCCINATE 500 MG: 500 INJECTION INTRAMUSCULAR; INTRAVENOUS at 08:15:00

## 2023-05-05 RX ADMIN — HEPARIN SODIUM 29000 UNITS: 1000 INJECTION, SOLUTION INTRAVENOUS; SUBCUTANEOUS at 09:17:00

## 2023-05-05 RX ADMIN — PHYTONADIONE 10 MG: 10 INJECTION, EMULSION INTRAMUSCULAR; INTRAVENOUS; SUBCUTANEOUS at 11:15:00

## 2023-05-05 RX ADMIN — MIDAZOLAM HYDROCHLORIDE 5 MG: 1 INJECTION INTRAMUSCULAR; INTRAVENOUS at 08:30:00

## 2023-05-05 RX ADMIN — CEFAZOLIN SODIUM/WATER 2 G: 2 G/20 ML SYRINGE (ML) INTRAVENOUS at 07:20:00

## 2023-05-05 RX ADMIN — SUFENTANIL CITRATE 25 MCG: 50 INJECTION EPIDURAL; INTRAVENOUS at 07:16:00

## 2023-05-05 RX ADMIN — NITROGLYCERIN 20 MCG/MIN: 20 INJECTION INTRAVENOUS at 12:00:00

## 2023-05-05 RX ADMIN — ROCURONIUM BROMIDE 100 MG: 10 INJECTION, SOLUTION INTRAVENOUS at 09:30:00

## 2023-05-05 RX ADMIN — MIDAZOLAM HYDROCHLORIDE 5 MG: 1 INJECTION INTRAMUSCULAR; INTRAVENOUS at 09:30:00

## 2023-05-05 RX ADMIN — DOBUTAMINE HYDROCHLORIDE 5 MCG/KG/MIN: 100 INJECTION INTRAVENOUS at 10:53:00

## 2023-05-05 RX ADMIN — CEFAZOLIN SODIUM/WATER 2 G: 2 G/20 ML SYRINGE (ML) INTRAVENOUS at 11:30:00

## 2023-05-05 RX ADMIN — ROCURONIUM BROMIDE 100 MG: 10 INJECTION, SOLUTION INTRAVENOUS at 07:16:00

## 2023-05-05 RX ADMIN — PROTAMINE SULFATE 250 MG: 10 INJECTION, SOLUTION INTRAVENOUS at 11:10:00

## 2023-05-05 RX ADMIN — ROCURONIUM BROMIDE 50 MG: 10 INJECTION, SOLUTION INTRAVENOUS at 09:13:00

## 2023-05-05 RX ADMIN — MIDAZOLAM HYDROCHLORIDE 3 MG: 1 INJECTION INTRAMUSCULAR; INTRAVENOUS at 07:07:00

## 2023-05-05 NOTE — DIETARY NOTE
Clinical Nutrition     Dietitian consult received per cardiac rehab standing order. Pt to be educated by cardiac rehab staff and encouraged to attend outpatient classes taught by EVERT. EVERT available PRN.     Buddy Morgan RD, LDN, [address]   Clinical Dietitian  Phone [phone]

## 2023-05-05 NOTE — ANESTHESIA PROCEDURE NOTES
Central Line    Performed by: Trinh Domingo MD  Authorized by: Trinh Domingo MD    General Information and Staff    Procedure Start:   Procedure End:  5/5/2023 7:40 AM  Anesthesiologist:  Trinh Domingo MD  Performed by:   Anesthesiologist  Patient Location:  OR  Indication: central venous access and CVP monitoring    Site Identification: real time ultrasound guided    Preanesthetic Checklist: 2 patient identifiers, IV checked, risks and benefits discussed, monitors and equipment checked, pre-op evaluation, timeout performed, anesthesia consent and sterile technique used    Procedure Detail    Patient Position:  Trendelenburg  Laterality:  Right  Site:  Internal jugular  Prep:  Chloraprep  Catheter Size:  9 Fr  Catheter Type:  MAC introducer  Number of Lumens:  Double lumen  Oximetric Catheter?: Yes    Procedure Detail: target vein identified, needle advanced into vein and blood aspirated and guidewire advanced into vein    Seldinger Technique?: Yes    Intravenous Verification: verified by ultrasound and venous blood return    Post Insertion: all ports aspirated, all ports flushed easily, guidewire was removed intact, line was sutured in place and dressing was applied      Assessment    Events: patient tolerated procedure well with no complications      PA Catheter Placement    PA Catheter Placed?: Yes    PA Catheter Type:  Oximetric  PA Catheter Size:  8  Laterality:  Right  Site:  Internal jugular  Placement Confirmation: pressure tracing changes and verified by FARHANA    Events: patient tolerated procedure well with no complications      Additional Comments

## 2023-05-05 NOTE — PLAN OF CARE
Assumed care of the pt at approx. 1930 pm. A&O x4, follows commands. Denies any pain. Xanax administered to help pt rest. Denies SOB, RA. NSR, SBP < 130. Continues with Heparin gtt. Cangrelor gtt stopped at 0400 am per order. R - groin side with some bruising. Soft, no hematoma. Slightly tender. NPO after midnight, pt verbalized understanding. Good UO overnight. Ambulated with standby assist in the room. Family at bedside. Updated on POC. Problem: CARDIOVASCULAR - ADULT  Goal: Maintains optimal cardiac output and hemodynamic stability  Description: INTERVENTIONS:  - Monitor vital signs, rhythm, and trends  - Monitor for bleeding, hypotension and signs of decreased cardiac output  - Evaluate effectiveness of vasoactive medications to optimize hemodynamic stability  - Monitor arterial and/or venous puncture sites for bleeding and/or hematoma  - Assess quality of pulses, skin color and temperature  - Assess for signs of decreased coronary artery perfusion - ex.  Angina  - Evaluate fluid balance, assess for edema, trend weights  Outcome: Progressing  Goal: Absence of cardiac arrhythmias or at baseline  Description: INTERVENTIONS:  - Continuous cardiac monitoring, monitor vital signs, obtain 12 lead EKG if indicated  - Evaluate effectiveness of antiarrhythmic and heart rate control medications as ordered  - Initiate emergency measures for life threatening arrhythmias  - Monitor electrolytes and administer replacement therapy as ordered  Outcome: Progressing     Problem: PAIN - ADULT  Goal: Verbalizes/displays adequate comfort level or patient's stated pain goal  Description: INTERVENTIONS:  - Encourage pt to monitor pain and request assistance  - Assess pain using appropriate pain scale  - Administer analgesics based on type and severity of pain and evaluate response  - Implement non-pharmacological measures as appropriate and evaluate response  - Consider cultural and social influences on pain and pain management  - Manage/alleviate anxiety  - Utilize distraction and/or relaxation techniques  - Monitor for opioid side effects  - Notify MD/LIP if interventions unsuccessful or patient reports new pain  - Anticipate increased pain with activity and pre-medicate as appropriate  Outcome: Progressing     Problem: SAFETY ADULT - FALL  Goal: Free from fall injury  Description: INTERVENTIONS:  - Assess pt frequently for physical needs  - Identify cognitive and physical deficits and behaviors that affect risk of falls.   - [place] fall precautions as indicated by assessment.  - Educate pt/family on patient safety including physical limitations  - Instruct pt to call for assistance with activity based on assessment  - Modify environment to reduce risk of injury  - Provide assistive devices as appropriate  - Consider OT/PT consult to assist with strengthening/mobility  - Encourage toileting schedule  Outcome: Progressing

## 2023-05-05 NOTE — OPERATIVE REPORT
Operative Report     Patient Name: Laurie Carlisle     Pre-Op Dx: CAD     Post-Op Dx: same     Procedure: CABG x 3; LIMA to LAD, SVG to OM, SVG to RCA     Surgeon: NADIYA Hong MD     Asst: Roseanna Wilder PA-C     Anesthesia: Cardiac     Complications: none     Specimen: none     Implants: none     Drains: 36 Fr CT x 2     EBL: ~800cc     Dispo: critical but stable on transfer

## 2023-05-05 NOTE — ANESTHESIA POSTPROCEDURE EVALUATION
[address] [address] Patient Status:  Inpatient   Age/Gender 38year old female MRN [id number]   [facility] 6NE-A Attending Kishor Hurtado DO   Hosp Day # 5 PCP Millie Grady DO       Anesthesia Post-op Note    CORONARY ARTERY BYPASS GRAFT TIMES THREE, ENDOSCOPIC VESSEL HARVEST OF LEFT LEG, INTRAOPERATIVE FARHANA    Procedure Summary     Date: 05/05/23 Room / Location: [address] [place] [address] 02 / [facility]    Anesthesia Start: 0705 Anesthesia Stop:     Procedure: CORONARY ARTERY BYPASS GRAFT TIMES THREE, ENDOSCOPIC VESSEL HARVEST OF LEFT LEG, INTRAOPERATIVE FARHANA Diagnosis: (coronary artery disease)    Surgeons: Jada Lenz MD Anesthesiologist: Rd Adams MD    Anesthesia Type: general ASA Status: 3          Anesthesia Type: general    Vitals Value Taken Time   /50 05/05/23 1236   Temp 96.8 05/05/23 1236   Pulse 94 05/05/23 1236   Resp 13 05/05/23 1236   SpO2 94 % 05/05/23 1236   Vitals shown include unvalidated device data. Patient Location: ICU    Anesthesia Type: general    Airway Patency: intubated    Postop Pain Control: sedated until time of extubation    Mental Status: mildly sedated but able to meaningfully participate in the post-anesthesia evaluation    Nausea/Vomiting: none    Cardiopulmonary/Hydration status: stable euvolemic    Complications: no apparent anesthesia related complications    Postop vital signs: stable    Dental Exam: Unchanged from Preop    Patient to be transferred to ICU.

## 2023-05-05 NOTE — ANESTHESIA PROCEDURE NOTES
Arterial Line    Date/Time: 5/5/2023 7:10 AM    Performed by: Phu Davis MD  Authorized by: Phu Davis MD    General Information and Staff    Procedure Start:  5/5/2023 7:10 AM  Procedure End:  5/5/2023 7:15 AM  Anesthesiologist:  Phu Davis MD  Performed By:  Anesthesiologist  Patient Location:  OR  Indication: continuous blood pressure monitoring and blood sampling needed    Site Identification: real time ultrasound guided and image stored and retrievable    Preanesthetic Checklist: 2 patient identifiers, IV checked, risks and benefits discussed, monitors and equipment checked, pre-op evaluation, timeout performed, anesthesia consent and sterile technique used    Procedure Details    Catheter Size:  20 G  Catheter Length:  1 and 3/4 inch  Catheter Type:  Arrow  Seldinger Technique?: Yes    Laterality:  Left  Site:  Radial artery  Site Prep: alcohol swabs and chlorhexidine    Line Secured:  Wrist Brace, tape and Tegaderm    Assessment    Events: patient tolerated procedure well with no complications    Other:  1 % lidocaine local    Medications  5/5/2023 7:10 AM      Additional Comments

## 2023-05-05 NOTE — ANESTHESIA PROCEDURE NOTES
Airway  Date/Time: 5/5/2023 7:18 AM  Urgency: elective      General Information and Staff    Patient location during procedure: OR  Anesthesiologist: Trinh Domingo MD  Performed: anesthesiologist   Performed by: Trinh Domingo MD  Authorized by: Trinh Domingo MD      Indications and Patient Condition  Indications for airway management: anesthesia  Spontaneous ventilation: present  Sedation level: moderate (conscious sedation)  Preoxygenated: yes  Patient position: sniffing  Mask difficulty assessment: 1 - vent by mask    Final Airway Details  Final airway type: endotracheal airway      Successful airway: ETT  Cuffed: yes   Successful intubation technique: direct laryngoscopy  Facilitating devices/methods: intubating stylet  Endotracheal tube insertion site: oral  Blade: Soraya  Blade size: #3  ETT size (mm): 7.5    Cormack-Lehane Classification: grade IIA - partial view of glottis  Placement verified by: chest auscultation and capnometry   Measured from: lips  ETT to lips (cm): 21  Number of attempts at approach: 1  Number of other approaches attempted: 0

## 2023-05-05 NOTE — PLAN OF CARE
Assumed patient care at [id number] from [place] [address]. Usual lines, propofol, epi, dobs, insulin infusing. Patient to stay intubated overnight. L radial a-line, waveform dampened and positional. L groin fem line. >30 point difference between arterial lines, per Dr. Colin Rosario to go off the femoral line. Do not remove this line unless otherwise instructed. Atrial and ventricular pacing wires, vvi 40/25 setting. Normal sinus rhythm to sinus tach, occasional PVCs. Chest tube drainage minimal, Ruchi and PA's updated until order cancelled. Around 1520, CT input increased, CO/CI drop, and HR in 118 SBP 88/49, team updated. 500 albumin given, patient BP and HR responding nicely. Epi titrated down to 1 per Colin Rosario. Patient waking up this evening and able to move and follow commands in all extremities. Propofol then increased for further sedation and Precedex started. ABGs communicated to Dr. Naveen Thomas, vent settings adjusted by RT per orders. Next repeat ABG at 2000.  brought to bedside, updated on POC. Will continue to monitor. Problem: CARDIOVASCULAR - ADULT  Goal: Maintains optimal cardiac output and hemodynamic stability  Description: INTERVENTIONS:  - Monitor vital signs, rhythm, and trends  - Monitor for bleeding, hypotension and signs of decreased cardiac output  - Evaluate effectiveness of vasoactive medications to optimize hemodynamic stability  - Monitor arterial and/or venous puncture sites for bleeding and/or hematoma  - Assess quality of pulses, skin color and temperature  - Assess for signs of decreased coronary artery perfusion - ex.  Angina  - Evaluate fluid balance, assess for edema, trend weights  Outcome: Progressing     Problem: CARDIOVASCULAR - ADULT  Goal: Absence of cardiac arrhythmias or at baseline  Description: INTERVENTIONS:  - Continuous cardiac monitoring, monitor vital signs, obtain 12 lead EKG if indicated  - Evaluate effectiveness of antiarrhythmic and heart rate control medications as ordered  - Initiate emergency measures for life threatening arrhythmias  - Monitor electrolytes and administer replacement therapy as ordered  Outcome: Progressing

## 2023-05-05 NOTE — PROGRESS NOTES
[facility] Pharmacy Note:  Renal Adjustment for Cefazolin and Vancomycin    Davie Vidales is a 38year old patient who has been prescribed cefazolin 2 gm every 8 hrs for 48 hours post surgery and vancomycin 15 mg/kg mg every 12 hrs for 24 hours post surgery. The estimated creatinine clearance is 95.7 mL/min (based on SCr of 0.76 mg/dL). .    Cefazolin and vancomycin are dosed appropriately based on current renal function. Pharmacy will continue to monitor and adjust frequency per hospital renal dose adjustment protocol for post-op surgical prophylaxis.      Thank you,    Bing Quintero, PharmD  5/5/2023  12:47 PM

## 2023-05-05 NOTE — ANESTHESIA PROCEDURE NOTES
Procedure Performed: FARHANA       Start Time:        End Time:      Preanesthesia Checklist:  Patient identified, IV assessed, risks and benefits discussed, monitors and equipment assessed, procedure being performed at surgeon's request and anesthesia consent obtained. General Procedure Information  Diagnostic Indications for Echo:  assessment of ascending aorta  Physician Requesting Echo: Malia Pierre MD  Location performed:  OR  Intubated  Bite block placed  Heart visualized  Probe Insertion:  Easy  Probe Type:  Multiplane  Modalities:  2D only, color flow mapping, pulse wave Doppler and continuous wave Doppler    Echocardiographic and Doppler Measurements    Ventricles    Right Ventricle:  Cavity size normal.  Hypertrophy not present. Thrombus not present. Global function normal.    Left Ventricle:  Cavity size normal.  Hypertrophy not present. Thrombus not present. Global Function normal.  Ejection Fraction 50%. Ventricular Regional Function:  3- Basal Anterolateral:  normal  6- Basal Inferoseptal:  hypokinetic  9- Mid Anterolateral:  normal  12- Mid Inferoseptal:  hypokinetic  14- Apical Lateral:  normal  16- Apical Septal:  hypokinetic  17- [place]:  normal      Valves    Aortic Valve:  Regurgitation absent. Leaflet motions normal.      Mitral Valve:  Regurgitation absent. Leaflet motions normal.      Tricuspid Valve:  Regurgitation absent. Aorta    Ascending Aorta:  Size normal.  Dissection not present. Descending Aorta:  Size normal.  Dissection not present. Atria    Right Atrium:  Spontaneous echo contrast not present. Thrombus not present. Tumor not present. Device present. Left Atrium:  Spontaneous echo contrast not present. Thrombus not present. Tumor not present. Device not present.                     Anesthesia Information  Performed Personally  Anesthesiologist:  Trinh Domingo MD

## 2023-05-06 ENCOUNTER — APPOINTMENT (OUTPATIENT)
Dept: GENERAL RADIOLOGY | Facility: HOSPITAL | Age: 43
End: 2023-05-06
Attending: PHYSICIAN ASSISTANT
Payer: COMMERCIAL

## 2023-05-06 LAB
BASE EXCESS BLD CALC-SCNC: -3 MMOL/L
BASE EXCESS BLDA CALC-SCNC: -1.5 MMOL/L (ref ?–2)
BASOPHILS # BLD AUTO: 0.01 X10(3) UL (ref 0–0.2)
BASOPHILS NFR BLD AUTO: 0.1 %
BODY TEMPERATURE: 98.6 F
BUN BLD-MCNC: 9 MG/DL (ref 7–18)
CA-I BLD-SCNC: 1.24 MMOL/L (ref 0.95–1.32)
CA-I BLD-SCNC: 1.33 MMOL/L (ref 1.12–1.32)
CALCIUM BLD-MCNC: 8.3 MG/DL (ref 8.5–10.1)
CHLORIDE SERPL-SCNC: 113 MMOL/L (ref 98–112)
CO2 BLD-SCNC: 24 MMOL/L (ref 22–32)
CO2 SERPL-SCNC: 23 MMOL/L (ref 21–32)
COHGB MFR BLD: 2.1 % SAT (ref 0–3)
CPAP: 5 CM H2O
CREAT BLD-MCNC: 0.73 MG/DL
EOSINOPHIL # BLD AUTO: 0 X10(3) UL (ref 0–0.7)
EOSINOPHIL NFR BLD AUTO: 0 %
ERYTHROCYTE [DISTWIDTH] IN BLOOD BY AUTOMATED COUNT: 14.1 %
FIO2: 40 %
GFR SERPLBLD BASED ON 1.73 SQ M-ARVRAT: 107 ML/MIN/1.73M2 (ref 60–?)
GLUCOSE BLD-MCNC: 111 MG/DL (ref 70–99)
GLUCOSE BLD-MCNC: 114 MG/DL (ref 70–99)
GLUCOSE BLD-MCNC: 115 MG/DL (ref 70–99)
GLUCOSE BLD-MCNC: 120 MG/DL (ref 70–99)
GLUCOSE BLD-MCNC: 120 MG/DL (ref 70–99)
GLUCOSE BLD-MCNC: 122 MG/DL (ref 70–99)
GLUCOSE BLD-MCNC: 123 MG/DL (ref 70–99)
GLUCOSE BLD-MCNC: 126 MG/DL (ref 70–99)
GLUCOSE BLD-MCNC: 130 MG/DL (ref 70–99)
GLUCOSE BLD-MCNC: 137 MG/DL (ref 70–99)
GLUCOSE BLD-MCNC: 138 MG/DL (ref 70–99)
GLUCOSE BLD-MCNC: 159 MG/DL (ref 70–99)
GLUCOSE BLD-MCNC: 237 MG/DL (ref 70–99)
GLUCOSE BLD-MCNC: 89 MG/DL (ref 70–99)
HCO3 BLD-SCNC: 22.6 MEQ/L
HCO3 BLDA-SCNC: 23.8 MEQ/L (ref 21–27)
HCT VFR BLD AUTO: 27.2 %
HCT VFR BLD CALC: 25 %
HGB BLD-MCNC: 9.3 G/DL
HGB BLD-MCNC: 9.4 G/DL
IMM GRANULOCYTES # BLD AUTO: 0.12 X10(3) UL (ref 0–1)
IMM GRANULOCYTES NFR BLD: 0.9 %
LACTATE BLD-SCNC: 0.8 MMOL/L (ref 0.5–2)
LYMPHOCYTES # BLD AUTO: 0.62 X10(3) UL (ref 1–4)
LYMPHOCYTES NFR BLD AUTO: 4.7 %
MAGNESIUM SERPL-MCNC: 2.1 MG/DL (ref 1.6–2.6)
MCH RBC QN AUTO: 30.7 PG (ref 26–34)
MCHC RBC AUTO-ENTMCNC: 34.6 G/DL (ref 31–37)
MCV RBC AUTO: 88.9 FL
METHGB MFR BLD: 0.4 % SAT (ref 0.4–1.5)
MONOCYTES # BLD AUTO: 0.93 X10(3) UL (ref 0.1–1)
MONOCYTES NFR BLD AUTO: 7 %
NEUTROPHILS # BLD AUTO: 11.55 X10 (3) UL (ref 1.5–7.7)
NEUTROPHILS # BLD AUTO: 11.55 X10(3) UL (ref 1.5–7.7)
NEUTROPHILS NFR BLD AUTO: 87.3 %
OXYHGB MFR BLDA: 97.5 % (ref 92–100)
P/F RATIO: 280 MMHG
PCO2 BLD: 39.2 MMHG
PCO2 BLDA: 35 MM HG (ref 35–45)
PH BLD: 7.37 [PH]
PH BLDA: 7.42 [PH] (ref 7.35–7.45)
PLATELET # BLD AUTO: 208 10(3)UL (ref 150–450)
PO2 BLD: 175 MMHG
PO2 BLDA: 112 MM HG (ref 80–100)
POTASSIUM BLD-SCNC: 4.6 MMOL/L (ref 3.6–5.1)
POTASSIUM BLD-SCNC: 4.6 MMOL/L (ref 3.6–5.1)
POTASSIUM SERPL-SCNC: 4.2 MMOL/L (ref 3.5–5.1)
PRESSURE SUPPORT: 5 CM H2O
RBC # BLD AUTO: 3.06 X10(6)UL
SAO2 % BLD: 100 %
SODIUM BLD-SCNC: 135 MMOL/L (ref 135–145)
SODIUM BLD-SCNC: 137 MMOL/L (ref 136–145)
SODIUM SERPL-SCNC: 141 MMOL/L (ref 136–145)
WBC # BLD AUTO: 13.2 X10(3) UL (ref 4–11)

## 2023-05-06 PROCEDURE — 99232 SBSQ HOSP IP/OBS MODERATE 35: CPT | Performed by: STUDENT IN AN ORGANIZED HEALTH CARE EDUCATION/TRAINING PROGRAM

## 2023-05-06 PROCEDURE — 71045 X-RAY EXAM CHEST 1 VIEW: CPT | Performed by: PHYSICIAN ASSISTANT

## 2023-05-06 NOTE — PLAN OF CARE
Assumed care of the pt at approx. 1930 pm. Pt sedated with Propofol & Precedex gtts. Restless at times, REYNOLDS. Opens her eyes and nods her head. Uptitrated sedation & administered Dilaudid via PCA pump until pt noted to rest comfortably. Vented. ABG done and reported to Dr. Jorge Luis Bullard. Will keep pt intubated overnight and attempt a breathing trial in am. ST on tele. HR in 100-teens. Epi & Dobs infusing per order (as instructed by previous shift, do not titrate Epi or Dobs per Dr. John Doran). Pt BP noted up to 150s on fem A-line so low dose of nitroglycerin was started with improvement. CVP 10-11. Roca in place, CO & CI stable. Insulin gtt. Good UO per liz. Family not present. Will continue to monitor. Addendum: Extubated at 4952 am. Femoral line pulled out per Dr. John Doran order. Weaned off Epi.

## 2023-05-06 NOTE — PROGRESS NOTES
Occupational Therapy    Orders received and chart review completed. Pt s/p CABG 5/5/23 and just extubated this AM. Per RN, pt not appropriate for therapy today and requesting OT to f/u tomorrow.

## 2023-05-06 NOTE — PLAN OF CARE
Received this AM approx 0730 on 5L NC. Tolerating clear liquid diet, advanced to cardiac-denies nausea. Dobutamine and insulin weaned off. [place] removed, cordis in place. Dangle/stand at side of the bed. Chest tubes dumped 210cc, will keep in today. Up to chair, ambulating to harden today. Pain well controlled, PCA within reach. IS at bedside, encouraging use.  and father at bedside today.

## 2023-05-07 ENCOUNTER — APPOINTMENT (OUTPATIENT)
Dept: GENERAL RADIOLOGY | Facility: HOSPITAL | Age: 43
End: 2023-05-07
Attending: THORACIC SURGERY (CARDIOTHORACIC VASCULAR SURGERY)
Payer: COMMERCIAL

## 2023-05-07 LAB
ANION GAP SERPL CALC-SCNC: 3 MMOL/L (ref 0–18)
ATRIAL RATE: 115 BPM
BASOPHILS # BLD AUTO: 0.02 X10(3) UL (ref 0–0.2)
BASOPHILS NFR BLD AUTO: 0.1 %
BLOOD TYPE BARCODE: 5100
BUN BLD-MCNC: 13 MG/DL (ref 7–18)
CALCIUM BLD-MCNC: 8.8 MG/DL (ref 8.5–10.1)
CHLORIDE SERPL-SCNC: 107 MMOL/L (ref 98–112)
CO2 SERPL-SCNC: 29 MMOL/L (ref 21–32)
CREAT BLD-MCNC: 0.72 MG/DL
EOSINOPHIL # BLD AUTO: 0 X10(3) UL (ref 0–0.7)
EOSINOPHIL NFR BLD AUTO: 0 %
ERYTHROCYTE [DISTWIDTH] IN BLOOD BY AUTOMATED COUNT: 14.6 %
GFR SERPLBLD BASED ON 1.73 SQ M-ARVRAT: 108 ML/MIN/1.73M2 (ref 60–?)
GLUCOSE BLD-MCNC: 117 MG/DL (ref 70–99)
GLUCOSE BLD-MCNC: 121 MG/DL (ref 70–99)
GLUCOSE BLD-MCNC: 133 MG/DL (ref 70–99)
GLUCOSE BLD-MCNC: 142 MG/DL (ref 70–99)
GLUCOSE BLD-MCNC: 155 MG/DL (ref 70–99)
HCT VFR BLD AUTO: 28.5 %
HGB BLD-MCNC: 9.1 G/DL
IMM GRANULOCYTES # BLD AUTO: 0.18 X10(3) UL (ref 0–1)
IMM GRANULOCYTES NFR BLD: 1 %
LYMPHOCYTES # BLD AUTO: 2.37 X10(3) UL (ref 1–4)
LYMPHOCYTES NFR BLD AUTO: 13.1 %
MCH RBC QN AUTO: 30 PG (ref 26–34)
MCHC RBC AUTO-ENTMCNC: 31.9 G/DL (ref 31–37)
MCV RBC AUTO: 94.1 FL
MONOCYTES # BLD AUTO: 2.68 X10(3) UL (ref 0.1–1)
MONOCYTES NFR BLD AUTO: 14.8 %
NEUTROPHILS # BLD AUTO: 12.88 X10 (3) UL (ref 1.5–7.7)
NEUTROPHILS # BLD AUTO: 12.88 X10(3) UL (ref 1.5–7.7)
NEUTROPHILS NFR BLD AUTO: 71 %
OSMOLALITY SERPL CALC.SUM OF ELEC: 290 MOSM/KG (ref 275–295)
P AXIS: 62 DEGREES
P-R INTERVAL: 152 MS
PLATELET # BLD AUTO: 274 10(3)UL (ref 150–450)
POTASSIUM SERPL-SCNC: 4.8 MMOL/L (ref 3.5–5.1)
Q-T INTERVAL: 328 MS
QRS DURATION: 94 MS
QTC CALCULATION (BEZET): 453 MS
R AXIS: 57 DEGREES
RBC # BLD AUTO: 3.03 X10(6)UL
SODIUM SERPL-SCNC: 139 MMOL/L (ref 136–145)
T AXIS: -24 DEGREES
UNIT VOLUME: 350 ML
VENTRICULAR RATE: 115 BPM
WBC # BLD AUTO: 18.1 X10(3) UL (ref 4–11)

## 2023-05-07 PROCEDURE — 71045 X-RAY EXAM CHEST 1 VIEW: CPT | Performed by: THORACIC SURGERY (CARDIOTHORACIC VASCULAR SURGERY)

## 2023-05-07 PROCEDURE — 99232 SBSQ HOSP IP/OBS MODERATE 35: CPT | Performed by: STUDENT IN AN ORGANIZED HEALTH CARE EDUCATION/TRAINING PROGRAM

## 2023-05-07 RX ORDER — HEPARIN SODIUM 5000 [USP'U]/ML
5000 INJECTION, SOLUTION INTRAVENOUS; SUBCUTANEOUS EVERY 12 HOURS SCHEDULED
Status: DISCONTINUED | OUTPATIENT
Start: 2023-05-07 | End: 2023-05-10

## 2023-05-07 RX ORDER — HEPARIN SODIUM 1000 [USP'U]/ML
5000 INJECTION, SOLUTION INTRAVENOUS; SUBCUTANEOUS EVERY 12 HOURS
Status: DISCONTINUED | OUTPATIENT
Start: 2023-05-07 | End: 2023-05-07

## 2023-05-07 RX ORDER — METOPROLOL TARTRATE 5 MG/5ML
5 INJECTION INTRAVENOUS ONCE
Status: COMPLETED | OUTPATIENT
Start: 2023-05-07 | End: 2023-05-07

## 2023-05-07 RX ORDER — CLOPIDOGREL BISULFATE 75 MG/1
75 TABLET ORAL DAILY
Status: DISCONTINUED | OUTPATIENT
Start: 2023-05-07 | End: 2023-05-10

## 2023-05-07 RX ORDER — HYDROCODONE BITARTRATE AND ACETAMINOPHEN 5; 325 MG/1; MG/1
2 TABLET ORAL EVERY 4 HOURS PRN
Status: DISCONTINUED | OUTPATIENT
Start: 2023-05-07 | End: 2023-05-10

## 2023-05-07 RX ORDER — HYDROCODONE BITARTRATE AND ACETAMINOPHEN 5; 325 MG/1; MG/1
1 TABLET ORAL EVERY 4 HOURS PRN
Status: DISCONTINUED | OUTPATIENT
Start: 2023-05-07 | End: 2023-05-10

## 2023-05-07 NOTE — PLAN OF CARE
Assumed care of the pt at approx. 1930 pm. A&O x4. C/o mid sternal incisional pain, improved with Dilaudid PCA. Denies SOB. On 3 L NC, weaning O2. Afebrile. SR/ST, HR in 90s-low 100s. SBP < 140. Off drips. CT in place, minimal output 40 ml overnight. After getting up to chair pt dumped 80 ml in her CT. No c/o nausea all night but got nauseous and dizzy when getting up to chair. UO approx. 40 ml/hr. Updated on POC. Problem: PAIN - ADULT  Goal: Verbalizes/displays adequate comfort level or patient's stated pain goal  Description: INTERVENTIONS:  - Encourage pt to monitor pain and request assistance  - Assess pain using appropriate pain scale  - Administer analgesics based on type and severity of pain and evaluate response  - Implement non-pharmacological measures as appropriate and evaluate response  - Consider cultural and social influences on pain and pain management  - Manage/alleviate anxiety  - Utilize distraction and/or relaxation techniques  - Monitor for opioid side effects  - Notify MD/LIP if interventions unsuccessful or patient reports new pain  - Anticipate increased pain with activity and pre-medicate as appropriate  Outcome: Progressing     Problem: SAFETY ADULT - FALL  Goal: Free from fall injury  Description: INTERVENTIONS:  - Assess pt frequently for physical needs  - Identify cognitive and physical deficits and behaviors that affect risk of falls.   - [place] fall precautions as indicated by assessment.  - Educate pt/family on patient safety including physical limitations  - Instruct pt to call for assistance with activity based on assessment  - Modify environment to reduce risk of injury  - Provide assistive devices as appropriate  - Consider OT/PT consult to assist with strengthening/mobility  - Encourage toileting schedule  Outcome: Progressing     Problem: CARDIOVASCULAR - ADULT  Goal: Maintains optimal cardiac output and hemodynamic stability  Description: INTERVENTIONS:  - Monitor vital signs, rhythm, and trends  - Monitor for bleeding, hypotension and signs of decreased cardiac output  - Evaluate effectiveness of vasoactive medications to optimize hemodynamic stability  - Monitor arterial and/or venous puncture sites for bleeding and/or hematoma  - Assess quality of pulses, skin color and temperature  - Assess for signs of decreased coronary artery perfusion - ex.  Angina  - Evaluate fluid balance, assess for edema, trend weights  Outcome: Progressing

## 2023-05-07 NOTE — PLAN OF CARE
SR on monitors. SBP stable. Weaned to RA, encouraging use of IS, pulling just below 250. Dilaudid PCA d/cd and norco started. Up to chair, ambulating in halls. Requiring a lot of encouragement with any activity. Very anxious and hesitant. Chest tubes/liz/cordis removed per order. CXR done. PW intact and secured. Spouse at bedside.

## 2023-05-08 LAB
ANION GAP SERPL CALC-SCNC: 6 MMOL/L (ref 0–18)
ATRIAL RATE: 100 BPM
ATRIAL RATE: 102 BPM
BASOPHILS # BLD AUTO: 0.02 X10(3) UL (ref 0–0.2)
BASOPHILS NFR BLD AUTO: 0.1 %
BUN BLD-MCNC: 15 MG/DL (ref 7–18)
CALCIUM BLD-MCNC: 8.8 MG/DL (ref 8.5–10.1)
CHLORIDE SERPL-SCNC: 105 MMOL/L (ref 98–112)
CO2 SERPL-SCNC: 26 MMOL/L (ref 21–32)
CREAT BLD-MCNC: 0.56 MG/DL
EOSINOPHIL # BLD AUTO: 0.01 X10(3) UL (ref 0–0.7)
EOSINOPHIL NFR BLD AUTO: 0.1 %
ERYTHROCYTE [DISTWIDTH] IN BLOOD BY AUTOMATED COUNT: 14.9 %
GFR SERPLBLD BASED ON 1.73 SQ M-ARVRAT: 118 ML/MIN/1.73M2 (ref 60–?)
GLUCOSE BLD-MCNC: 117 MG/DL (ref 70–99)
GLUCOSE BLD-MCNC: 121 MG/DL (ref 70–99)
GLUCOSE BLD-MCNC: 171 MG/DL (ref 70–99)
GLUCOSE BLD-MCNC: 180 MG/DL (ref 70–99)
GLUCOSE BLD-MCNC: 195 MG/DL (ref 70–99)
HCT VFR BLD AUTO: 26.9 %
HGB BLD-MCNC: 8.7 G/DL
IMM GRANULOCYTES # BLD AUTO: 0.17 X10(3) UL (ref 0–1)
IMM GRANULOCYTES NFR BLD: 1.1 %
LYMPHOCYTES # BLD AUTO: 2.39 X10(3) UL (ref 1–4)
LYMPHOCYTES NFR BLD AUTO: 14.8 %
MCH RBC QN AUTO: 30.3 PG (ref 26–34)
MCHC RBC AUTO-ENTMCNC: 32.3 G/DL (ref 31–37)
MCV RBC AUTO: 93.7 FL
MONOCYTES # BLD AUTO: 1.92 X10(3) UL (ref 0.1–1)
MONOCYTES NFR BLD AUTO: 11.9 %
NEUTROPHILS # BLD AUTO: 11.63 X10 (3) UL (ref 1.5–7.7)
NEUTROPHILS # BLD AUTO: 11.63 X10(3) UL (ref 1.5–7.7)
NEUTROPHILS NFR BLD AUTO: 72 %
OSMOLALITY SERPL CALC.SUM OF ELEC: 286 MOSM/KG (ref 275–295)
P AXIS: 18 DEGREES
P AXIS: 4 DEGREES
P-R INTERVAL: 140 MS
P-R INTERVAL: 144 MS
PLATELET # BLD AUTO: 280 10(3)UL (ref 150–450)
POTASSIUM SERPL-SCNC: 4.1 MMOL/L (ref 3.5–5.1)
Q-T INTERVAL: 318 MS
Q-T INTERVAL: 334 MS
QRS DURATION: 102 MS
QRS DURATION: 98 MS
QTC CALCULATION (BEZET): 410 MS
QTC CALCULATION (BEZET): 435 MS
R AXIS: 24 DEGREES
R AXIS: 26 DEGREES
RBC # BLD AUTO: 2.87 X10(6)UL
SODIUM SERPL-SCNC: 137 MMOL/L (ref 136–145)
T AXIS: -26 DEGREES
T AXIS: -30 DEGREES
VENTRICULAR RATE: 100 BPM
VENTRICULAR RATE: 102 BPM
WBC # BLD AUTO: 16.1 X10(3) UL (ref 4–11)

## 2023-05-08 PROCEDURE — 99232 SBSQ HOSP IP/OBS MODERATE 35: CPT | Performed by: STUDENT IN AN ORGANIZED HEALTH CARE EDUCATION/TRAINING PROGRAM

## 2023-05-08 RX ORDER — HYDRALAZINE HYDROCHLORIDE 20 MG/ML
5 INJECTION INTRAMUSCULAR; INTRAVENOUS EVERY 4 HOURS PRN
Status: DISCONTINUED | OUTPATIENT
Start: 2023-05-08 | End: 2023-05-10

## 2023-05-08 RX ORDER — METOPROLOL TARTRATE 50 MG/1
50 TABLET, FILM COATED ORAL
Status: DISCONTINUED | OUTPATIENT
Start: 2023-05-08 | End: 2023-05-10

## 2023-05-08 RX ORDER — FUROSEMIDE 10 MG/ML
40 INJECTION INTRAMUSCULAR; INTRAVENOUS DAILY
Status: DISCONTINUED | OUTPATIENT
Start: 2023-05-08 | End: 2023-05-08

## 2023-05-08 RX ORDER — FUROSEMIDE 10 MG/ML
40 INJECTION INTRAMUSCULAR; INTRAVENOUS ONCE
Status: DISCONTINUED | OUTPATIENT
Start: 2023-05-08 | End: 2023-05-08

## 2023-05-08 RX ORDER — FUROSEMIDE 10 MG/ML
40 INJECTION INTRAMUSCULAR; INTRAVENOUS
Status: DISCONTINUED | OUTPATIENT
Start: 2023-05-08 | End: 2023-05-09

## 2023-05-08 NOTE — PLAN OF CARE
Assumed care of Antonia Norton at [facility]; up in chair. Alert/oriented x 4. Teary eyed when discussed plan for another walk tonight needing encouragement. Approx 2100, ambulated along the hallway 100 ft w/ supplemental O2 for comfort; dyspneac on exertion as verbalized by patient. Over night, SBP above goal of <140. [facility]CI paged with orders. Lopressor/IV given. Then at  [address], Hydralazine/IV given. 0630- Up in chair. Weaned off to room air. Dr. Lisa Rosales updated with pt's status; to hold Heparin/SQ until seen by CV for possible pacer wires to be pulled today.

## 2023-05-08 NOTE — OPERATIVE REPORT
[facility]    PATIENT'S NAME: Fer Villa   ATTENDING PHYSICIAN: Navid Lubin DO   OPERATING PHYSICIAN: Shayna Field MD   PATIENT ACCOUNT#:   [de-identified]    LOCATION:  [address] [place] [address]  MEDICAL RECORD #:   [id number]       YOB: 1980  ADMISSION DATE:       04/29/2023      OPERATION DATE:  05/05/2023    OPERATIVE REPORT    PREOPERATIVE DIAGNOSIS:  Coronary artery disease, recent myocardial infarction. POSTOPERATIVE DIAGNOSIS:  Coronary artery disease, recent myocardial infarction. PROCEDURE:  Coronary revascularization x3:  Left internal mammary artery graft to the LAD, saphenous vein graft to the obtuse marginal and acute marginal branch of the right coronary artery. ASSISTANT:  Eva Weston PA-C. ANESTHESIA:  General endotracheal.    BLOOD LOSS:  600 mL. COMPLICATIONS:  None. INDICATIONS:  The patient is a super sweet 56-year-old female patient who came into the hospital with chest pain. Catheterization demonstrates severe coronary artery disease primarily affecting the LAD and the circumflex. She underwent initially a successful stent, and subsequent to stenting patient began having episodes of chest pain and was readmitted from home. Repeat catheterization demonstrates significant restenosis of both the stented circumflex and LAD. Additionally, on the original diagnostic catheter, there appears to be some stenosis of the ostium of the right coronary artery. Balloon pump was placed, was in place for a few days, then was able to be removed. Risks, benefits, and options of surgery were discussed. We had to wait a few days in the hospital.  The patient is coming off Plavix and Brilinta and cangrelor. OPERATIVE TECHNIQUE:  Appropriate lines and catheters were placed by Dr. Miri Barragan after inducing general anesthesia. [place] was placed. Transesophageal echo was placed. Transcranial oximetry was placed. Patient was prepped and draped.   Sternotomy was performed. Left internal mammary was taken down. Greater saphenous graft was harvested endoscopically from the left leg. The patient was heparinized and cannulated for bypass. On bypass, patient was cooled to 32 degrees centigrade. The aorta was crossclamped. Blood cardioplegia was infused to achieve electromechanical arrest of the heart. The coronaries were inspected. One could see that the area of infarction was related to the obtuse marginal branch of the circumflex. There was a little air in the artery distally which might have been localized dissection. I opened the artery just proximal to that and was able to be easily pass a 1.5 mm probe. There was no clot. A segment of saphenous graft was placed here. Cardioplegia was given, following which the right coronary artery was dissected out. The acute marginal turned out to be the best approach to the artery. This was a 1.5 mm artery. I could probe retrograde into the main right coronary artery. A segment of saphenous graft was placed here. Cardioplegia was given, following which the left internal mammary artery was placed to the LAD roughly at the junction of middle and distal third of the LAD. At this level, LAD was a 1.5 mm artery of pretty good quality. Mammary was a good quality conduit. Rewarming was begun while proximal anastomoses for the 2 vein grafts were constructed end-to-side to the aorta. Warm cardioplegia was given. The aorta was unclamped. Heart spontaneously defibrillated ultimately to sinus rhythm. Following complete and thorough rewarming, the patient weaned from bypass without difficulty. Pump time 92 minutes. Crossclamp 75 minutes. Cardioplegia 2.2 L. The patient was decannulated. Protamine was administered. Pacing leads were applied to the heart. Chest was closed with double-stranded wire, supplemented with 3 titanium plates, one 4-hole and two 8-hole plates.   The patient was taken to the ICU in stable condition. The patient's family was updated by me regarding the patient's condition and the conduct of the operation.     Dictated By Leisa Alan MD  d: 05/05/2023 12:56:22  t: 05/05/2023 16:28:49  Job [id number]  BF/

## 2023-05-08 NOTE — PLAN OF CARE
Assumed patient care at 0730. Patient resting in chair, HR slightly elevated, EKG being done. Sinus tachycardia. Metoprolol increased by cardiology. No c/o pain during the shift,  however does note significant fatigue and exhaustion. Motivation and encouragement given for walks, transfers. Some AUGUSTE remains. Edema noted, lasix started today, good urine response. Pacer wires removed by CV surg PA, tolerated well. Up in chair for all meals. Working with PT. 1x dose Solumedrol. Attempting IS, still only pulling around 250. No BM today but is passing more gas, PRN Senna given. Will continue to monitor. Problem: CARDIOVASCULAR - ADULT  Goal: Maintains optimal cardiac output and hemodynamic stability  Description: INTERVENTIONS:  - Monitor vital signs, rhythm, and trends  - Monitor for bleeding, hypotension and signs of decreased cardiac output  - Evaluate effectiveness of vasoactive medications to optimize hemodynamic stability  - Monitor arterial and/or venous puncture sites for bleeding and/or hematoma  - Assess quality of pulses, skin color and temperature  - Assess for signs of decreased coronary artery perfusion - ex.  Angina  - Evaluate fluid balance, assess for edema, trend weights  Outcome: Progressing     Problem: CARDIOVASCULAR - ADULT  Goal: Absence of cardiac arrhythmias or at baseline  Description: INTERVENTIONS:  - Continuous cardiac monitoring, monitor vital signs, obtain 12 lead EKG if indicated  - Evaluate effectiveness of antiarrhythmic and heart rate control medications as ordered  - Initiate emergency measures for life threatening arrhythmias  - Monitor electrolytes and administer replacement therapy as ordered  Outcome: Progressing

## 2023-05-09 LAB
ANION GAP SERPL CALC-SCNC: 6 MMOL/L (ref 0–18)
BASOPHILS # BLD AUTO: 0.01 X10(3) UL (ref 0–0.2)
BASOPHILS NFR BLD AUTO: 0.1 %
BUN BLD-MCNC: 14 MG/DL (ref 7–18)
CALCIUM BLD-MCNC: 9.1 MG/DL (ref 8.5–10.1)
CHLORIDE SERPL-SCNC: 102 MMOL/L (ref 98–112)
CO2 SERPL-SCNC: 30 MMOL/L (ref 21–32)
CREAT BLD-MCNC: 0.69 MG/DL
EOSINOPHIL # BLD AUTO: 0 X10(3) UL (ref 0–0.7)
EOSINOPHIL NFR BLD AUTO: 0 %
ERYTHROCYTE [DISTWIDTH] IN BLOOD BY AUTOMATED COUNT: 14.6 %
GFR SERPLBLD BASED ON 1.73 SQ M-ARVRAT: 112 ML/MIN/1.73M2 (ref 60–?)
GLUCOSE BLD-MCNC: 119 MG/DL (ref 70–99)
GLUCOSE BLD-MCNC: 141 MG/DL (ref 70–99)
GLUCOSE BLD-MCNC: 145 MG/DL (ref 70–99)
GLUCOSE BLD-MCNC: 152 MG/DL (ref 70–99)
GLUCOSE BLD-MCNC: 156 MG/DL (ref 70–99)
HCT VFR BLD AUTO: 28.3 %
HGB BLD-MCNC: 9.1 G/DL
IMM GRANULOCYTES # BLD AUTO: 0.17 X10(3) UL (ref 0–1)
IMM GRANULOCYTES NFR BLD: 1.4 %
LYMPHOCYTES # BLD AUTO: 1.01 X10(3) UL (ref 1–4)
LYMPHOCYTES NFR BLD AUTO: 8.3 %
MCH RBC QN AUTO: 29.6 PG (ref 26–34)
MCHC RBC AUTO-ENTMCNC: 32.2 G/DL (ref 31–37)
MCV RBC AUTO: 92.2 FL
MONOCYTES # BLD AUTO: 0.46 X10(3) UL (ref 0.1–1)
MONOCYTES NFR BLD AUTO: 3.8 %
NEUTROPHILS # BLD AUTO: 10.52 X10 (3) UL (ref 1.5–7.7)
NEUTROPHILS # BLD AUTO: 10.52 X10(3) UL (ref 1.5–7.7)
NEUTROPHILS NFR BLD AUTO: 86.4 %
OSMOLALITY SERPL CALC.SUM OF ELEC: 289 MOSM/KG (ref 275–295)
PLATELET # BLD AUTO: 281 10(3)UL (ref 150–450)
POTASSIUM SERPL-SCNC: 3.5 MMOL/L (ref 3.5–5.1)
POTASSIUM SERPL-SCNC: 3.6 MMOL/L (ref 3.5–5.1)
RBC # BLD AUTO: 3.07 X10(6)UL
SODIUM SERPL-SCNC: 138 MMOL/L (ref 136–145)
WBC # BLD AUTO: 12.2 X10(3) UL (ref 4–11)

## 2023-05-09 PROCEDURE — 99232 SBSQ HOSP IP/OBS MODERATE 35: CPT | Performed by: STUDENT IN AN ORGANIZED HEALTH CARE EDUCATION/TRAINING PROGRAM

## 2023-05-09 RX ORDER — FUROSEMIDE 10 MG/ML
40 INJECTION INTRAMUSCULAR; INTRAVENOUS DAILY
Status: DISCONTINUED | OUTPATIENT
Start: 2023-05-10 | End: 2023-05-10

## 2023-05-09 RX ORDER — ALPRAZOLAM 0.5 MG/1
0.25 TABLET ORAL 3 TIMES DAILY PRN
Status: SHIPPED | COMMUNITY
Start: 2023-05-09

## 2023-05-09 RX ORDER — POTASSIUM CHLORIDE 20 MEQ/1
40 TABLET, EXTENDED RELEASE ORAL EVERY 4 HOURS
Status: COMPLETED | OUTPATIENT
Start: 2023-05-09 | End: 2023-05-09

## 2023-05-09 RX ORDER — DIPHENHYDRAMINE HCL 25 MG
25 CAPSULE ORAL EVERY 4 HOURS PRN
Status: DISCONTINUED | OUTPATIENT
Start: 2023-05-09 | End: 2023-05-10

## 2023-05-09 RX ORDER — LOSARTAN POTASSIUM 25 MG/1
25 TABLET ORAL DAILY
Status: DISCONTINUED | OUTPATIENT
Start: 2023-05-10 | End: 2023-05-10

## 2023-05-09 RX ORDER — LOSARTAN POTASSIUM 50 MG/1
50 TABLET ORAL DAILY
Status: DISCONTINUED | OUTPATIENT
Start: 2023-05-09 | End: 2023-05-09

## 2023-05-09 RX ORDER — DIPHENHYDRAMINE HYDROCHLORIDE 50 MG/ML
12.5 INJECTION INTRAMUSCULAR; INTRAVENOUS EVERY 4 HOURS PRN
Status: DISCONTINUED | OUTPATIENT
Start: 2023-05-09 | End: 2023-05-10

## 2023-05-09 NOTE — PLAN OF CARE
Received this a.m., awake and alert, tearful when couldn't wipe self. Sinus tachycardic on monitor, hemodynamically stable. When working with therapy this morning, complaining of lightheadedness, sbp 90's, also shortly after complaining of itchy nails and feet. First dose of cozaar given one hour prior but had received it prior to surgery. Updated Faythe Scales, apn, IV benadryl given with relief stated by patient. Later on in day ambulating in hallway with walker and one person assist. Emotional support provided, plan of care updated, questions answered, verbalized understanding.

## 2023-05-09 NOTE — PROGRESS NOTES
05/09/23 1216   Clinical Encounter Type   Visited With Family; Patient not available   Routine Visit Introduction   Continue Visiting No   Patient's Supportive Strategies/Resources Family, Community   Family Spiritual Encounters   Family Coping Accepting   Family Participation in Care Consistently   Family Support During Treatment Consistently   Taxonomy   Intended Effects Demonstrate caring and concern   Methods Offer support; Offer emotional support   Interventions Acknowledge current situation; Active listening; Ask guided questions; Ask questions to bring forth feelings;Silent prayer   Trigger for Consult   Trigger for Spiritual Care Consult No      initiated introductory visit (based on Pt's length of stay, <7 days). Pt Toño Brody) was asleep during the visit. Her in-laws were present at bedside. They reported that they were doing well, encouraged with Estefany's progress so far. They shared about their extended family and how they had been taking turns to make sure Yunier Erickson, her , and children had the support they needed.  offered emotional support as they shared. Estefany's  came back into the room and reported that he was feeling encouraged this week and hopeful Yunier Erickson would be discharged in the next couple of days. Family is aware of chaplains availability and that [facility] can be requested via RN. Spiritual Care support can be requested via an [facility] consult or ext. [phone].        Alrin Andino M.Div, Chaplain Resident  Ext. [phone]

## 2023-05-09 NOTE — PLAN OF CARE
Assumed pt care at [facility]. A&Ox4. RA, lung sounds diminished. , needs encouragement. NSR/ST with ambulation on tele. Voiding yellow urine. Up with SBA/walker, ambulating in halls. Midsternal and BL leg incisions approximated with steri strips, painted with betadine. POC updated with pt, questions answered, verbalized understanding. Will continue to monitor. Problem: Diabetes/Glucose Control  Goal: Glucose maintained within prescribed range  Description: INTERVENTIONS:  - Monitor Blood Glucose as ordered  - Assess for signs and symptoms of hyperglycemia and hypoglycemia  - Administer ordered medications to maintain glucose within target range  - Assess barriers to adequate nutritional intake and initiate nutrition consult as needed  - Instruct patient on self management of diabetes  Outcome: Progressing     Problem: Patient/Family Goals  Goal: Patient/Family Long Term Goal  Description: Patient's Long Term Goal: Return home with issues fixed    Interventions:    - See additional Care Plan goals for specific interventions  Outcome: Progressing  Goal: Patient/Family Short Term Goal  Description: Patient's Short Term Goal: improve chest pain    Interventions:     - See additional Care Plan goals for specific interventions  Outcome: Progressing     Problem: CARDIOVASCULAR - ADULT  Goal: Maintains optimal cardiac output and hemodynamic stability  Description: INTERVENTIONS:  - Monitor vital signs, rhythm, and trends  - Monitor for bleeding, hypotension and signs of decreased cardiac output  - Evaluate effectiveness of vasoactive medications to optimize hemodynamic stability  - Monitor arterial and/or venous puncture sites for bleeding and/or hematoma  - Assess quality of pulses, skin color and temperature  - Assess for signs of decreased coronary artery perfusion - ex.  Angina  - Evaluate fluid balance, assess for edema, trend weights  Outcome: Progressing  Goal: Absence of cardiac arrhythmias or at baseline  Description: INTERVENTIONS:  - Continuous cardiac monitoring, monitor vital signs, obtain 12 lead EKG if indicated  - Evaluate effectiveness of antiarrhythmic and heart rate control medications as ordered  - Initiate emergency measures for life threatening arrhythmias  - Monitor electrolytes and administer replacement therapy as ordered  Outcome: Progressing     Problem: PAIN - ADULT  Goal: Verbalizes/displays adequate comfort level or patient's stated pain goal  Description: INTERVENTIONS:  - Encourage pt to monitor pain and request assistance  - Assess pain using appropriate pain scale  - Administer analgesics based on type and severity of pain and evaluate response  - Implement non-pharmacological measures as appropriate and evaluate response  - Consider cultural and social influences on pain and pain management  - Manage/alleviate anxiety  - Utilize distraction and/or relaxation techniques  - Monitor for opioid side effects  - Notify MD/LIP if interventions unsuccessful or patient reports new pain  - Anticipate increased pain with activity and pre-medicate as appropriate  Outcome: Progressing     Problem: RISK FOR INFECTION - ADULT  Goal: Absence of fever/infection during anticipated neutropenic period  Description: INTERVENTIONS  - Monitor WBC  - Administer growth factors as ordered  - Implement neutropenic guidelines  Outcome: Progressing     Problem: SAFETY ADULT - FALL  Goal: Free from fall injury  Description: INTERVENTIONS:  - Assess pt frequently for physical needs  - Identify cognitive and physical deficits and behaviors that affect risk of falls.   - [place] fall precautions as indicated by assessment.  - Educate pt/family on patient safety including physical limitations  - Instruct pt to call for assistance with activity based on assessment  - Modify environment to reduce risk of injury  - Provide assistive devices as appropriate  - Consider OT/PT consult to assist with strengthening/mobility  - Encourage toileting schedule  Outcome: Progressing     Problem: DISCHARGE PLANNING  Goal: Discharge to home or other facility with appropriate resources  Description: INTERVENTIONS:  - Identify barriers to discharge w/pt and caregiver  - Include patient/family/discharge partner in discharge planning  - Arrange for needed discharge resources and transportation as appropriate  - Identify discharge learning needs (meds, wound care, etc)  - Arrange for interpreters to assist at discharge as needed  - Consider post-discharge preferences of patient/family/discharge partner  - Complete POLST form as appropriate  - Assess patient's ability to be responsible for managing their own health  - Refer to Case Management Department for coordinating discharge planning if the patient needs post-hospital services based on physician/LIP order or complex needs related to functional status, cognitive ability or social support system  Outcome: Progressing     Problem: Safety Risk - Non-Violent Restraints  Goal: Patient will remain free from self-harm  Description: INTERVENTIONS:  - Apply the least restrictive restraint to prevent harm  - Notify patient and family of reasons restraints applied  - Assess for any contributing factors to confusion (electrolyte disturbances, delirium, medications)  - Discontinue any unnecessary medical devices as soon as possible  - Assess the patient's physical comfort, circulation, skin condition, hydration, nutrition and elimination needs   - Reorient and redirection as needed  - Assess for the need to continue restraints  Outcome: Progressing

## 2023-05-10 VITALS
WEIGHT: 259.69 LBS | HEIGHT: 67.01 IN | HEART RATE: 95 BPM | DIASTOLIC BLOOD PRESSURE: 87 MMHG | RESPIRATION RATE: 22 BRPM | OXYGEN SATURATION: 100 % | TEMPERATURE: 98 F | SYSTOLIC BLOOD PRESSURE: 120 MMHG | BODY MASS INDEX: 40.76 KG/M2

## 2023-05-10 PROBLEM — N28.9 RENAL INSUFFICIENCY: Status: RESOLVED | Noted: 2023-04-30 | Resolved: 2023-05-10

## 2023-05-10 PROBLEM — I21.4 NSTEMI (NON-ST ELEVATED MYOCARDIAL INFARCTION) (HCC): Status: RESOLVED | Noted: 2023-04-24 | Resolved: 2023-05-10

## 2023-05-10 PROBLEM — E87.1 HYPONATREMIA: Status: RESOLVED | Noted: 2023-04-30 | Resolved: 2023-05-10

## 2023-05-10 LAB
ANION GAP SERPL CALC-SCNC: <0 MMOL/L (ref 0–18)
BASOPHILS # BLD AUTO: 0.02 X10(3) UL (ref 0–0.2)
BASOPHILS NFR BLD AUTO: 0.1 %
BUN BLD-MCNC: 19 MG/DL (ref 7–18)
BUN BLD-MCNC: 19 MG/DL (ref 7–18)
CALCIUM BLD-MCNC: 9 MG/DL (ref 8.5–10.1)
CALCIUM BLD-MCNC: 9 MG/DL (ref 8.5–10.1)
CHLORIDE SERPL-SCNC: 109 MMOL/L (ref 98–112)
CHLORIDE SERPL-SCNC: 109 MMOL/L (ref 98–112)
CO2 SERPL-SCNC: 30 MMOL/L (ref 21–32)
CO2 SERPL-SCNC: 30 MMOL/L (ref 21–32)
CREAT BLD-MCNC: 0.62 MG/DL
CREAT BLD-MCNC: 0.62 MG/DL
EOSINOPHIL # BLD AUTO: 0 X10(3) UL (ref 0–0.7)
EOSINOPHIL NFR BLD AUTO: 0 %
ERYTHROCYTE [DISTWIDTH] IN BLOOD BY AUTOMATED COUNT: 15.3 %
GFR SERPLBLD BASED ON 1.73 SQ M-ARVRAT: 115 ML/MIN/1.73M2 (ref 60–?)
GFR SERPLBLD BASED ON 1.73 SQ M-ARVRAT: 115 ML/MIN/1.73M2 (ref 60–?)
GLUCOSE BLD-MCNC: 103 MG/DL (ref 70–99)
GLUCOSE BLD-MCNC: 107 MG/DL (ref 70–99)
GLUCOSE BLD-MCNC: 107 MG/DL (ref 70–99)
GLUCOSE BLD-MCNC: 108 MG/DL (ref 70–99)
HCT VFR BLD AUTO: 26.1 %
HGB BLD-MCNC: 8.2 G/DL
IMM GRANULOCYTES # BLD AUTO: 0.3 X10(3) UL (ref 0–1)
IMM GRANULOCYTES NFR BLD: 1.8 %
LYMPHOCYTES # BLD AUTO: 1.7 X10(3) UL (ref 1–4)
LYMPHOCYTES NFR BLD AUTO: 10.4 %
MAGNESIUM SERPL-MCNC: 2.4 MG/DL (ref 1.6–2.6)
MCH RBC QN AUTO: 30.1 PG (ref 26–34)
MCHC RBC AUTO-ENTMCNC: 31.4 G/DL (ref 31–37)
MCV RBC AUTO: 96 FL
MONOCYTES # BLD AUTO: 1.17 X10(3) UL (ref 0.1–1)
MONOCYTES NFR BLD AUTO: 7.1 %
NEUTROPHILS # BLD AUTO: 13.22 X10 (3) UL (ref 1.5–7.7)
NEUTROPHILS # BLD AUTO: 13.22 X10(3) UL (ref 1.5–7.7)
NEUTROPHILS NFR BLD AUTO: 80.6 %
OSMOLALITY SERPL CALC.SUM OF ELEC: 289 MOSM/KG (ref 275–295)
PLATELET # BLD AUTO: 276 10(3)UL (ref 150–450)
POTASSIUM SERPL-SCNC: 4.4 MMOL/L (ref 3.5–5.1)
POTASSIUM SERPL-SCNC: 4.4 MMOL/L (ref 3.5–5.1)
POTASSIUM SERPL-SCNC: 4.5 MMOL/L (ref 3.5–5.1)
RBC # BLD AUTO: 2.72 X10(6)UL
SODIUM SERPL-SCNC: 138 MMOL/L (ref 136–145)
SODIUM SERPL-SCNC: 138 MMOL/L (ref 136–145)
WBC # BLD AUTO: 16.4 X10(3) UL (ref 4–11)

## 2023-05-10 PROCEDURE — 99239 HOSP IP/OBS DSCHRG MGMT >30: CPT | Performed by: INTERNAL MEDICINE

## 2023-05-10 RX ORDER — HYDROCODONE BITARTRATE AND ACETAMINOPHEN 5; 325 MG/1; MG/1
1-2 TABLET ORAL EVERY 6 HOURS PRN
Qty: 60 TABLET | Refills: 0 | Status: SHIPPED | OUTPATIENT
Start: 2023-05-10

## 2023-05-10 RX ORDER — LOSARTAN POTASSIUM 50 MG/1
25 TABLET ORAL DAILY
Qty: 60 TABLET | Refills: 3 | Status: SHIPPED
Start: 2023-05-10

## 2023-05-10 RX ORDER — METOPROLOL TARTRATE 50 MG/1
50 TABLET, FILM COATED ORAL
Qty: 60 TABLET | Refills: 11 | Status: SHIPPED
Start: 2023-05-10

## 2023-05-10 NOTE — PLAN OF CARE
Assumed care of pt around 1930. A/Ox4 JOSE MARIA SMITH. No complaints of pain. Reports good sleep throughout the night. Hopes to go home today.

## 2023-05-10 NOTE — CARDIAC REHAB
Cardiac rehab staff completed education. Sharon Goldstein has watched the CV surgery video. Phase 2 appointment has been set.

## 2023-05-11 ENCOUNTER — TELEPHONE (OUTPATIENT)
Dept: FAMILY MEDICINE CLINIC | Facility: CLINIC | Age: 43
End: 2023-05-11

## 2023-05-11 ENCOUNTER — PATIENT OUTREACH (OUTPATIENT)
Dept: CASE MANAGEMENT | Age: 43
End: 2023-05-11

## 2023-05-11 DIAGNOSIS — Z02.9 ENCOUNTERS FOR UNSPECIFIED ADMINISTRATIVE PURPOSE: ICD-10-CM

## 2023-05-11 DIAGNOSIS — I21.4 NSTEMI (NON-ST ELEVATED MYOCARDIAL INFARCTION) (HCC): ICD-10-CM

## 2023-05-11 NOTE — TELEPHONE ENCOUNTER
ANISA, pt is in current TCM. Pt has upcoming non TCM appointment scheduled on 5/15/23 with PCP. TCM/HFU appt recommended by 5/17/23 as pt is a high risk for readmission. Please discuss with PCP if okay to change to TCM/HFU appointment. NCM unable to change visit. Thank you.      BOOK BY DATE (last date for TCM): 05/24/23

## 2023-05-15 ENCOUNTER — OFFICE VISIT (OUTPATIENT)
Dept: FAMILY MEDICINE CLINIC | Facility: CLINIC | Age: 43
End: 2023-05-15
Payer: COMMERCIAL

## 2023-05-15 VITALS
HEART RATE: 96 BPM | SYSTOLIC BLOOD PRESSURE: 136 MMHG | DIASTOLIC BLOOD PRESSURE: 80 MMHG | OXYGEN SATURATION: 99 % | RESPIRATION RATE: 16 BRPM | HEIGHT: 67 IN | BODY MASS INDEX: 39.55 KG/M2 | WEIGHT: 252 LBS

## 2023-05-15 DIAGNOSIS — F41.1 GAD (GENERALIZED ANXIETY DISORDER): ICD-10-CM

## 2023-05-15 DIAGNOSIS — F33.2 SEVERE RECURRENT MAJOR DEPRESSION WITHOUT PSYCHOTIC FEATURES (HCC): ICD-10-CM

## 2023-05-15 DIAGNOSIS — Z95.1 S/P CABG X 3: Primary | ICD-10-CM

## 2023-05-15 DIAGNOSIS — I10 BENIGN ESSENTIAL HTN: ICD-10-CM

## 2023-05-15 DIAGNOSIS — I25.10 CAD, MULTIPLE VESSEL: ICD-10-CM

## 2023-05-15 DIAGNOSIS — E78.2 MIXED HYPERLIPIDEMIA: ICD-10-CM

## 2023-05-15 PROBLEM — N93.9 VAGINAL BLEEDING: Status: RESOLVED | Noted: 2021-03-21 | Resolved: 2023-05-15

## 2023-05-15 PROBLEM — E66.01 MORBID (SEVERE) OBESITY DUE TO EXCESS CALORIES (HCC): Status: ACTIVE | Noted: 2023-05-15

## 2023-05-15 PROBLEM — R07.9 ACUTE CHEST PAIN: Status: RESOLVED | Noted: 2023-04-30 | Resolved: 2023-05-15

## 2023-05-15 PROCEDURE — 3079F DIAST BP 80-89 MM HG: CPT | Performed by: PHYSICIAN ASSISTANT

## 2023-05-15 PROCEDURE — 99214 OFFICE O/P EST MOD 30 MIN: CPT | Performed by: PHYSICIAN ASSISTANT

## 2023-05-15 PROCEDURE — 3008F BODY MASS INDEX DOCD: CPT | Performed by: PHYSICIAN ASSISTANT

## 2023-05-15 PROCEDURE — 3075F SYST BP GE 130 - 139MM HG: CPT | Performed by: PHYSICIAN ASSISTANT

## 2023-05-17 DIAGNOSIS — E06.3 HYPOTHYROIDISM DUE TO HASHIMOTO'S THYROIDITIS: ICD-10-CM

## 2023-05-17 DIAGNOSIS — E03.8 HYPOTHYROIDISM DUE TO HASHIMOTO'S THYROIDITIS: ICD-10-CM

## 2023-05-17 RX ORDER — LEVOTHYROXINE SODIUM 0.2 MG/1
TABLET ORAL
Qty: 90 TABLET | Refills: 0 | Status: SHIPPED | OUTPATIENT
Start: 2023-05-17

## 2023-05-18 ENCOUNTER — LAB ENCOUNTER (OUTPATIENT)
Dept: LAB | Facility: HOSPITAL | Age: 43
End: 2023-05-18
Attending: THORACIC SURGERY (CARDIOTHORACIC VASCULAR SURGERY)
Payer: COMMERCIAL

## 2023-05-18 ENCOUNTER — HOSPITAL ENCOUNTER (OUTPATIENT)
Dept: GENERAL RADIOLOGY | Facility: HOSPITAL | Age: 43
Discharge: HOME | End: 2023-05-18
Attending: THORACIC SURGERY (CARDIOTHORACIC VASCULAR SURGERY)
Payer: COMMERCIAL

## 2023-05-18 DIAGNOSIS — J90 PLEURAL EFFUSION: ICD-10-CM

## 2023-05-18 DIAGNOSIS — I10 BENIGN ESSENTIAL HTN: ICD-10-CM

## 2023-05-18 DIAGNOSIS — I25.10 CAD, MULTIPLE VESSEL: ICD-10-CM

## 2023-05-18 LAB
ALBUMIN SERPL-MCNC: 3.2 G/DL (ref 3.4–5)
ALBUMIN/GLOB SERPL: 0.8 {RATIO} (ref 1–2)
ALP LIVER SERPL-CCNC: 100 U/L
ALT SERPL-CCNC: 52 U/L
ANION GAP SERPL CALC-SCNC: 2 MMOL/L (ref 0–18)
AST SERPL-CCNC: 29 U/L (ref 15–37)
BASOPHILS # BLD AUTO: 0.01 X10(3) UL (ref 0–0.2)
BASOPHILS NFR BLD AUTO: 0.1 %
BILIRUB SERPL-MCNC: 0.4 MG/DL (ref 0.1–2)
BUN BLD-MCNC: 14 MG/DL (ref 7–18)
CALCIUM BLD-MCNC: 9.6 MG/DL (ref 8.5–10.1)
CHLORIDE SERPL-SCNC: 109 MMOL/L (ref 98–112)
CO2 SERPL-SCNC: 28 MMOL/L (ref 21–32)
CREAT BLD-MCNC: 0.88 MG/DL
EOSINOPHIL # BLD AUTO: 0 X10(3) UL (ref 0–0.7)
EOSINOPHIL NFR BLD AUTO: 0 %
ERYTHROCYTE [DISTWIDTH] IN BLOOD BY AUTOMATED COUNT: 16.6 %
FASTING STATUS PATIENT QL REPORTED: YES
GFR SERPLBLD BASED ON 1.73 SQ M-ARVRAT: 85 ML/MIN/1.73M2 (ref 60–?)
GLOBULIN PLAS-MCNC: 3.8 G/DL (ref 2.8–4.4)
GLUCOSE BLD-MCNC: 116 MG/DL (ref 70–99)
HCT VFR BLD AUTO: 32.7 %
HGB BLD-MCNC: 10 G/DL
IMM GRANULOCYTES # BLD AUTO: 0.05 X10(3) UL (ref 0–1)
IMM GRANULOCYTES NFR BLD: 0.6 %
LYMPHOCYTES # BLD AUTO: 1.47 X10(3) UL (ref 1–4)
LYMPHOCYTES NFR BLD AUTO: 16.8 %
MCH RBC QN AUTO: 30.1 PG (ref 26–34)
MCHC RBC AUTO-ENTMCNC: 30.6 G/DL (ref 31–37)
MCV RBC AUTO: 98.5 FL
MONOCYTES # BLD AUTO: 0.51 X10(3) UL (ref 0.1–1)
MONOCYTES NFR BLD AUTO: 5.8 %
NEUTROPHILS # BLD AUTO: 6.69 X10 (3) UL (ref 1.5–7.7)
NEUTROPHILS # BLD AUTO: 6.69 X10(3) UL (ref 1.5–7.7)
NEUTROPHILS NFR BLD AUTO: 76.7 %
OSMOLALITY SERPL CALC.SUM OF ELEC: 289 MOSM/KG (ref 275–295)
PLATELET # BLD AUTO: 335 10(3)UL (ref 150–450)
POTASSIUM SERPL-SCNC: 5.2 MMOL/L (ref 3.5–5.1)
PROT SERPL-MCNC: 7 G/DL (ref 6.4–8.2)
RBC # BLD AUTO: 3.32 X10(6)UL
SODIUM SERPL-SCNC: 139 MMOL/L (ref 136–145)
WBC # BLD AUTO: 8.7 X10(3) UL (ref 4–11)

## 2023-05-18 PROCEDURE — 71048 X-RAY EXAM CHEST 4+ VIEWS: CPT | Performed by: THORACIC SURGERY (CARDIOTHORACIC VASCULAR SURGERY)

## 2023-05-18 PROCEDURE — 36415 COLL VENOUS BLD VENIPUNCTURE: CPT

## 2023-05-18 PROCEDURE — 80053 COMPREHEN METABOLIC PANEL: CPT

## 2023-05-18 PROCEDURE — 85025 COMPLETE CBC W/AUTO DIFF WBC: CPT

## 2023-05-25 ENCOUNTER — ORDER TRANSCRIPTION (OUTPATIENT)
Dept: CARDIAC REHAB | Facility: HOSPITAL | Age: 43
End: 2023-05-25

## 2023-05-25 DIAGNOSIS — Z95.1 S/P CABG (CORONARY ARTERY BYPASS GRAFT): Primary | ICD-10-CM

## 2023-06-01 ENCOUNTER — CARDPULM VISIT (OUTPATIENT)
Dept: CARDIAC REHAB | Facility: HOSPITAL | Age: 43
End: 2023-06-01
Attending: INTERNAL MEDICINE
Payer: COMMERCIAL

## 2023-06-06 ENCOUNTER — CARDPULM VISIT (OUTPATIENT)
Dept: CARDIAC REHAB | Facility: HOSPITAL | Age: 43
End: 2023-06-06
Attending: INTERNAL MEDICINE
Payer: COMMERCIAL

## 2023-06-06 PROCEDURE — 93798 PHYS/QHP OP CAR RHAB W/ECG: CPT

## 2023-06-08 ENCOUNTER — CARDPULM VISIT (OUTPATIENT)
Dept: CARDIAC REHAB | Facility: HOSPITAL | Age: 43
End: 2023-06-08
Attending: INTERNAL MEDICINE
Payer: COMMERCIAL

## 2023-06-08 PROCEDURE — 93798 PHYS/QHP OP CAR RHAB W/ECG: CPT

## 2023-06-12 ENCOUNTER — CARDPULM VISIT (OUTPATIENT)
Dept: CARDIAC REHAB | Facility: HOSPITAL | Age: 43
End: 2023-06-12
Attending: INTERNAL MEDICINE
Payer: COMMERCIAL

## 2023-06-12 PROCEDURE — 93798 PHYS/QHP OP CAR RHAB W/ECG: CPT

## 2023-06-13 ENCOUNTER — APPOINTMENT (OUTPATIENT)
Dept: CARDIAC REHAB | Facility: HOSPITAL | Age: 43
End: 2023-06-13
Attending: INTERNAL MEDICINE
Payer: COMMERCIAL

## 2023-06-14 ENCOUNTER — CARDPULM VISIT (OUTPATIENT)
Dept: CARDIAC REHAB | Facility: HOSPITAL | Age: 43
End: 2023-06-14
Attending: INTERNAL MEDICINE
Payer: COMMERCIAL

## 2023-06-14 PROCEDURE — 93798 PHYS/QHP OP CAR RHAB W/ECG: CPT

## 2023-06-15 ENCOUNTER — APPOINTMENT (OUTPATIENT)
Dept: CARDIAC REHAB | Facility: HOSPITAL | Age: 43
End: 2023-06-15
Attending: INTERNAL MEDICINE
Payer: COMMERCIAL

## 2023-06-16 ENCOUNTER — CARDPULM VISIT (OUTPATIENT)
Dept: CARDIAC REHAB | Facility: HOSPITAL | Age: 43
End: 2023-06-16
Attending: INTERNAL MEDICINE
Payer: COMMERCIAL

## 2023-06-16 PROCEDURE — 93798 PHYS/QHP OP CAR RHAB W/ECG: CPT

## 2023-06-19 ENCOUNTER — CARDPULM VISIT (OUTPATIENT)
Dept: CARDIAC REHAB | Facility: HOSPITAL | Age: 43
End: 2023-06-19
Attending: INTERNAL MEDICINE
Payer: COMMERCIAL

## 2023-06-19 PROCEDURE — 93798 PHYS/QHP OP CAR RHAB W/ECG: CPT

## 2023-06-20 ENCOUNTER — APPOINTMENT (OUTPATIENT)
Dept: CARDIAC REHAB | Facility: HOSPITAL | Age: 43
End: 2023-06-20
Attending: INTERNAL MEDICINE
Payer: COMMERCIAL

## 2023-06-21 ENCOUNTER — CARDPULM VISIT (OUTPATIENT)
Dept: CARDIAC REHAB | Facility: HOSPITAL | Age: 43
End: 2023-06-21
Attending: INTERNAL MEDICINE
Payer: COMMERCIAL

## 2023-06-21 PROCEDURE — 93798 PHYS/QHP OP CAR RHAB W/ECG: CPT

## 2023-06-22 ENCOUNTER — APPOINTMENT (OUTPATIENT)
Dept: CARDIAC REHAB | Facility: HOSPITAL | Age: 43
End: 2023-06-22
Attending: INTERNAL MEDICINE
Payer: COMMERCIAL

## 2023-06-22 ENCOUNTER — LAB ENCOUNTER (OUTPATIENT)
Dept: LAB | Age: 43
End: 2023-06-22
Attending: NURSE PRACTITIONER
Payer: COMMERCIAL

## 2023-06-22 DIAGNOSIS — E78.2 MIXED HYPERLIPIDEMIA: Primary | ICD-10-CM

## 2023-06-22 LAB
CHOLEST SERPL-MCNC: 137 MG/DL (ref ?–200)
FASTING PATIENT LIPID ANSWER: YES
HDLC SERPL-MCNC: 44 MG/DL (ref 40–59)
LDLC SERPL CALC-MCNC: 64 MG/DL (ref ?–100)
NONHDLC SERPL-MCNC: 93 MG/DL (ref ?–130)
TRIGL SERPL-MCNC: 169 MG/DL (ref 30–149)
VLDLC SERPL CALC-MCNC: 25 MG/DL (ref 0–30)

## 2023-06-22 PROCEDURE — 80061 LIPID PANEL: CPT

## 2023-06-22 PROCEDURE — 36415 COLL VENOUS BLD VENIPUNCTURE: CPT

## 2023-06-23 ENCOUNTER — CARDPULM VISIT (OUTPATIENT)
Dept: CARDIAC REHAB | Facility: HOSPITAL | Age: 43
End: 2023-06-23
Attending: INTERNAL MEDICINE
Payer: COMMERCIAL

## 2023-06-23 PROCEDURE — 93798 PHYS/QHP OP CAR RHAB W/ECG: CPT

## 2023-06-26 ENCOUNTER — CARDPULM VISIT (OUTPATIENT)
Dept: CARDIAC REHAB | Facility: HOSPITAL | Age: 43
End: 2023-06-26
Attending: INTERNAL MEDICINE
Payer: COMMERCIAL

## 2023-06-26 VITALS — HEIGHT: 67 IN | WEIGHT: 252 LBS | BODY MASS INDEX: 39.55 KG/M2

## 2023-06-26 PROCEDURE — 93798 PHYS/QHP OP CAR RHAB W/ECG: CPT

## 2023-06-27 ENCOUNTER — HOSPITAL ENCOUNTER (OUTPATIENT)
Dept: CT IMAGING | Age: 43
Discharge: HOME | End: 2023-06-27
Attending: INTERNAL MEDICINE
Payer: COMMERCIAL

## 2023-06-27 ENCOUNTER — APPOINTMENT (OUTPATIENT)
Dept: CARDIAC REHAB | Facility: HOSPITAL | Age: 43
End: 2023-06-27
Attending: INTERNAL MEDICINE
Payer: COMMERCIAL

## 2023-06-27 DIAGNOSIS — R07.89 CHEST PRESSURE: ICD-10-CM

## 2023-06-27 DIAGNOSIS — R07.89 STERNUM PAIN: ICD-10-CM

## 2023-06-27 PROCEDURE — 71260 CT THORAX DX C+: CPT | Performed by: INTERNAL MEDICINE

## 2023-06-28 ENCOUNTER — CARDPULM VISIT (OUTPATIENT)
Dept: CARDIAC REHAB | Facility: HOSPITAL | Age: 43
End: 2023-06-28
Attending: INTERNAL MEDICINE
Payer: COMMERCIAL

## 2023-06-28 PROCEDURE — 93798 PHYS/QHP OP CAR RHAB W/ECG: CPT

## 2023-06-29 ENCOUNTER — APPOINTMENT (OUTPATIENT)
Dept: CARDIAC REHAB | Facility: HOSPITAL | Age: 43
End: 2023-06-29
Attending: INTERNAL MEDICINE
Payer: COMMERCIAL

## 2023-06-30 ENCOUNTER — APPOINTMENT (OUTPATIENT)
Dept: CARDIAC REHAB | Facility: HOSPITAL | Age: 43
End: 2023-06-30
Attending: INTERNAL MEDICINE
Payer: COMMERCIAL

## 2023-06-30 PROCEDURE — 93798 PHYS/QHP OP CAR RHAB W/ECG: CPT

## 2023-07-03 ENCOUNTER — CARDPULM VISIT (OUTPATIENT)
Dept: CARDIAC REHAB | Facility: HOSPITAL | Age: 43
End: 2023-07-03
Attending: INTERNAL MEDICINE
Payer: COMMERCIAL

## 2023-07-03 PROCEDURE — 93798 PHYS/QHP OP CAR RHAB W/ECG: CPT

## 2023-07-05 ENCOUNTER — CARDPULM VISIT (OUTPATIENT)
Dept: CARDIAC REHAB | Facility: HOSPITAL | Age: 43
End: 2023-07-05
Attending: INTERNAL MEDICINE
Payer: COMMERCIAL

## 2023-07-05 ENCOUNTER — HOSPITAL ENCOUNTER (OUTPATIENT)
Dept: GENERAL RADIOLOGY | Age: 43
Discharge: HOME | End: 2023-07-05
Attending: PHYSICIAN ASSISTANT
Payer: COMMERCIAL

## 2023-07-05 DIAGNOSIS — F41.1 GAD (GENERALIZED ANXIETY DISORDER): ICD-10-CM

## 2023-07-05 DIAGNOSIS — R07.89 STERNAL PAIN: ICD-10-CM

## 2023-07-05 DIAGNOSIS — F33.2 SEVERE RECURRENT MAJOR DEPRESSION WITHOUT PSYCHOTIC FEATURES (HCC): ICD-10-CM

## 2023-07-05 PROCEDURE — 93798 PHYS/QHP OP CAR RHAB W/ECG: CPT

## 2023-07-05 PROCEDURE — 71046 X-RAY EXAM CHEST 2 VIEWS: CPT | Performed by: PHYSICIAN ASSISTANT

## 2023-07-05 NOTE — TELEPHONE ENCOUNTER
.A refill request was received for:  Requested Prescriptions     Pending Prescriptions Disp Refills    venlafaxine  MG Oral Capsule SR 24 Hr 180 capsule 1     Sig: Take 1 capsule (150 mg total) by mouth 2 (two) times daily.        Last refill date:   3/18/2023    Last office visit: 5/15/2023    Follow up due:  Future Appointments   Date Time Provider Sidney Padron   7/17/2023  9:45 AM [facility] CARDIAC REHAB ROOM [address]   7/19/2023  9:45 AM [facility] CARDIAC REHAB ROOM 1 [address]   7/21/2023  9:45 AM [address] [place] [address] CARDIAC REHAB ROOM 1 [address]   7/24/2023  9:45 AM [address] [place] [address] CARDIAC REHAB ROOM 1 [address]   7/26/2023  9:45 AM [facility] CARDIAC REHAB ROOM 1 [address]   7/28/2023  9:45 AM [address] [place] [address] CARDIAC REHAB ROOM 1 [address]   7/31/2023  9:45 AM [address] [place] [address] CARDIAC REHAB ROOM 1 [address]   8/2/2023  9:45 AM [address] [place] [address] CARDIAC REHAB ROOM 1 [address]   8/4/2023  9:45 AM [address] [place] [address] CARDIAC REHAB ROOM 1 [address]   8/7/2023  9:45 AM [address] [facility] CARDIAC REHAB ROOM 1 [address]   8/9/2023  9:45 AM [address] [facility] CARDIAC REH[facility] ROOM 1 [address]   8/11/2023  9:45 AM [address] [facility] CARDIAC REH[facility] ROOM 1 [address]   8/14/2023  9:45 AM [facility] [facility] CARDIAC REHAB ROOM 1 [address]   8/16/2023  9:45 AM [address] [facility] CARDIAC REH[facility] ROOM 1 [address]   8/18/2023  9:45 AM [address] [facility] CARDIAC REH[facility] ROOM 1 [address]   8/21/2023  9:45 AM [address] [facility] CARDIAC REH[facility] ROOM 1 [address]   8/23/2023  9:45 AM [address] [facility] CARDIAC REH[facility] ROOM 1 [address]   8/25/2023  9:45 AM [facility] [facility] CARDIAC REH[facility] ROOM 1 [address]   8/28/2023  9:45 AM [address] [facility] CARDIAC REH[facility] ROOM 1 [address]   8/30/2023  9:45 AM [address] [facility] CARDIAC REH[facility] ROOM 1 [address]   9/1/2023  9:45 AM [address] [facility] CARDIAC REHAB ROOM 1 [address]

## 2023-07-07 ENCOUNTER — APPOINTMENT (OUTPATIENT)
Dept: CARDIAC REHAB | Facility: HOSPITAL | Age: 43
End: 2023-07-07
Attending: INTERNAL MEDICINE
Payer: COMMERCIAL

## 2023-07-07 RX ORDER — VENLAFAXINE HYDROCHLORIDE 150 MG/1
150 CAPSULE, EXTENDED RELEASE ORAL 2 TIMES DAILY
Qty: 180 CAPSULE | Refills: 1 | Status: SHIPPED | OUTPATIENT
Start: 2023-07-07

## 2023-07-10 ENCOUNTER — APPOINTMENT (OUTPATIENT)
Dept: CARDIAC REHAB | Facility: HOSPITAL | Age: 43
End: 2023-07-10
Attending: INTERNAL MEDICINE
Payer: COMMERCIAL

## 2023-07-12 ENCOUNTER — APPOINTMENT (OUTPATIENT)
Dept: CARDIAC REHAB | Facility: HOSPITAL | Age: 43
End: 2023-07-12
Attending: INTERNAL MEDICINE
Payer: COMMERCIAL

## 2023-07-14 ENCOUNTER — APPOINTMENT (OUTPATIENT)
Dept: CARDIAC REHAB | Facility: HOSPITAL | Age: 43
End: 2023-07-14
Attending: INTERNAL MEDICINE
Payer: COMMERCIAL

## 2023-07-17 ENCOUNTER — LAB ENCOUNTER (OUTPATIENT)
Dept: LAB | Age: 43
End: 2023-07-17
Attending: THORACIC SURGERY (CARDIOTHORACIC VASCULAR SURGERY)
Payer: COMMERCIAL

## 2023-07-17 ENCOUNTER — CARDPULM VISIT (OUTPATIENT)
Dept: CARDIAC REHAB | Facility: HOSPITAL | Age: 43
End: 2023-07-17
Attending: INTERNAL MEDICINE
Payer: COMMERCIAL

## 2023-07-17 DIAGNOSIS — Z01.818 PRE-OP TESTING: ICD-10-CM

## 2023-07-17 LAB
ALBUMIN SERPL-MCNC: 3.8 G/DL (ref 3.4–5)
ALBUMIN/GLOB SERPL: 1.1 {RATIO} (ref 1–2)
ALP LIVER SERPL-CCNC: 104 U/L
ALT SERPL-CCNC: 32 U/L
ANION GAP SERPL CALC-SCNC: 5 MMOL/L (ref 0–18)
APTT PPP: 29.1 SECONDS (ref 23.3–35.6)
AST SERPL-CCNC: 25 U/L (ref 15–37)
ATRIAL RATE: 83 BPM
BASOPHILS # BLD AUTO: 0.01 X10(3) UL (ref 0–0.2)
BASOPHILS NFR BLD AUTO: 0.2 %
BILIRUB SERPL-MCNC: 0.3 MG/DL (ref 0.1–2)
BUN BLD-MCNC: 11 MG/DL (ref 7–18)
CALCIUM BLD-MCNC: 9.3 MG/DL (ref 8.5–10.1)
CHLORIDE SERPL-SCNC: 108 MMOL/L (ref 98–112)
CO2 SERPL-SCNC: 27 MMOL/L (ref 21–32)
CREAT BLD-MCNC: 0.92 MG/DL
EOSINOPHIL # BLD AUTO: 0.01 X10(3) UL (ref 0–0.7)
EOSINOPHIL NFR BLD AUTO: 0.2 %
ERYTHROCYTE [DISTWIDTH] IN BLOOD BY AUTOMATED COUNT: 13.9 %
FASTING STATUS PATIENT QL REPORTED: YES
GFR SERPLBLD BASED ON 1.73 SQ M-ARVRAT: 81 ML/MIN/1.73M2 (ref 60–?)
GLOBULIN PLAS-MCNC: 3.4 G/DL (ref 2.8–4.4)
GLUCOSE BLD-MCNC: 79 MG/DL (ref 70–99)
HCT VFR BLD AUTO: 40.5 %
HGB BLD-MCNC: 12.7 G/DL
IMM GRANULOCYTES # BLD AUTO: 0.02 X10(3) UL (ref 0–1)
IMM GRANULOCYTES NFR BLD: 0.3 %
INR BLD: 0.99 (ref 0.85–1.16)
LYMPHOCYTES # BLD AUTO: 1.29 X10(3) UL (ref 1–4)
LYMPHOCYTES NFR BLD AUTO: 21.4 %
MCH RBC QN AUTO: 26.8 PG (ref 26–34)
MCHC RBC AUTO-ENTMCNC: 31.4 G/DL (ref 31–37)
MCV RBC AUTO: 85.6 FL
MONOCYTES # BLD AUTO: 0.58 X10(3) UL (ref 0.1–1)
MONOCYTES NFR BLD AUTO: 9.6 %
NEUTROPHILS # BLD AUTO: 4.12 X10 (3) UL (ref 1.5–7.7)
NEUTROPHILS # BLD AUTO: 4.12 X10(3) UL (ref 1.5–7.7)
NEUTROPHILS NFR BLD AUTO: 68.3 %
OSMOLALITY SERPL CALC.SUM OF ELEC: 288 MOSM/KG (ref 275–295)
P AXIS: 59 DEGREES
P-R INTERVAL: 174 MS
PLATELET # BLD AUTO: 228 10(3)UL (ref 150–450)
POTASSIUM SERPL-SCNC: 4.3 MMOL/L (ref 3.5–5.1)
PROT SERPL-MCNC: 7.2 G/DL (ref 6.4–8.2)
PROTHROMBIN TIME: 13.1 SECONDS (ref 11.6–14.8)
Q-T INTERVAL: 390 MS
QRS DURATION: 94 MS
QTC CALCULATION (BEZET): 458 MS
R AXIS: 55 DEGREES
RBC # BLD AUTO: 4.73 X10(6)UL
SODIUM SERPL-SCNC: 140 MMOL/L (ref 136–145)
T AXIS: 88 DEGREES
VENTRICULAR RATE: 83 BPM
WBC # BLD AUTO: 6 X10(3) UL (ref 4–11)

## 2023-07-17 PROCEDURE — 85610 PROTHROMBIN TIME: CPT

## 2023-07-17 PROCEDURE — 80053 COMPREHEN METABOLIC PANEL: CPT

## 2023-07-17 PROCEDURE — 93005 ELECTROCARDIOGRAM TRACING: CPT

## 2023-07-17 PROCEDURE — 93798 PHYS/QHP OP CAR RHAB W/ECG: CPT

## 2023-07-17 PROCEDURE — 36415 COLL VENOUS BLD VENIPUNCTURE: CPT

## 2023-07-17 PROCEDURE — 85730 THROMBOPLASTIN TIME PARTIAL: CPT

## 2023-07-17 PROCEDURE — 85025 COMPLETE CBC W/AUTO DIFF WBC: CPT

## 2023-07-17 PROCEDURE — 93010 ELECTROCARDIOGRAM REPORT: CPT | Performed by: INTERNAL MEDICINE

## 2023-07-19 ENCOUNTER — CARDPULM VISIT (OUTPATIENT)
Dept: CARDIAC REHAB | Facility: HOSPITAL | Age: 43
End: 2023-07-19
Attending: INTERNAL MEDICINE
Payer: COMMERCIAL

## 2023-07-19 PROCEDURE — 93798 PHYS/QHP OP CAR RHAB W/ECG: CPT

## 2023-07-21 ENCOUNTER — CARDPULM VISIT (OUTPATIENT)
Dept: CARDIAC REHAB | Facility: HOSPITAL | Age: 43
End: 2023-07-21
Attending: INTERNAL MEDICINE
Payer: COMMERCIAL

## 2023-07-21 PROCEDURE — 93798 PHYS/QHP OP CAR RHAB W/ECG: CPT

## 2023-07-24 ENCOUNTER — CARDPULM VISIT (OUTPATIENT)
Dept: CARDIAC REHAB | Facility: HOSPITAL | Age: 43
End: 2023-07-24
Attending: INTERNAL MEDICINE
Payer: COMMERCIAL

## 2023-07-24 PROCEDURE — 93798 PHYS/QHP OP CAR RHAB W/ECG: CPT

## 2023-07-26 ENCOUNTER — APPOINTMENT (OUTPATIENT)
Dept: CARDIAC REHAB | Facility: HOSPITAL | Age: 43
End: 2023-07-26
Attending: INTERNAL MEDICINE
Payer: COMMERCIAL

## 2023-07-26 PROCEDURE — 93798 PHYS/QHP OP CAR RHAB W/ECG: CPT

## 2023-07-27 ENCOUNTER — CARDPULM VISIT (OUTPATIENT)
Dept: CARDIAC REHAB | Facility: HOSPITAL | Age: 43
End: 2023-07-27
Attending: INTERNAL MEDICINE
Payer: COMMERCIAL

## 2023-07-27 PROCEDURE — 93798 PHYS/QHP OP CAR RHAB W/ECG: CPT

## 2023-07-27 NOTE — PAT NURSING NOTE
Pre-Surgical Instructions for 8/7/23    Patient confirmed understanding of pre-surgical instructions. Visitor Instructions     -Visitors can accompany you the day of surgery.  -If you are admitted, visiting hours are 7:00 am to 8:00 pm.         Pre-Op Instructions     Scheduled Surgery: You are scheduled for Cardiac Surgery with Dr. Green Hence        Date of Procedure: Monday, 08/07/23        Time of Arrival:  0600      -This is going to be a tentative time of arrival for surgery.   -We will call you the buisness day prior to your surgery in the late afternoon to reconfirm your arrival.   -Please check your voicemail for a message. Diet Instructions:      -Do not eat or drink after 10:00 pm. This includes water, other fluids, gum, candy and food. Medication Instructions:     -The morning of surgery only take your Metoprolol and Aspirin with a sip of water.  -Unless otherwise directed to stop, you will continue your prescription medications through Sunday, 8/6. Medications to Stop:      -The last dose of Clopidogrel (Plavix) will be 7/30.  -The last dose of vitamins, supplements, and NSAID's (ex. Ibuprofen, Excederin, Aleve, Diclofenac, and any gels having steroids) will be 7/30.  -The last dose of Losartan will be 8/3. Skin Prep:      -Shower with Hibiclens x3, take 2 showers the day prior to your surgery in the morning and at bedtime. Take your third shower the morning of your surgery date.  -You can buy the Hibiclens at any pharmacy. Admit/Discharge Status:      -If you are an outpatient, you will recover approximately 2 hours after the completion of your surgery prior to your discharge to go home. -If sedation is given, you WILL NOT be able to drive home. You will need a responsible adult  to drive you home. -Bring an overnight bag with you in the event the MD wants you to spend the night.           Discharge Teaching:      -Your nurse will give you specific instructions before discharge.  -Any questions, please call the surgeon's office. Additional Instructions:      -Bring insurance card(s) and picture ID.  -Leave all valuables at home, including jewelry.  -Wear appropriate clothing.  -No contacts, wear glasses.  -You'll receive arrival time 1 business day prior to scheduled surgery     -Park in the [place] parking garage.  -Check in at the [employer] reception desk in the [place]. -Our  will be there to check you in for your procedure.   -Complimentary [place] parking is available starting at 6:00 am        If you are scheduled to arrive early for 5:30 am, the [employer] reception desk does not open till 5:30 am. It may be dark, but you are in the correct location. We are open M-F from 8am- 5pm. We are closed on holidays and weekends. We can be reached at [phone].             Thank you

## 2023-07-28 ENCOUNTER — APPOINTMENT (OUTPATIENT)
Dept: CARDIAC REHAB | Facility: HOSPITAL | Age: 43
End: 2023-07-28
Attending: INTERNAL MEDICINE
Payer: COMMERCIAL

## 2023-07-28 PROCEDURE — 93798 PHYS/QHP OP CAR RHAB W/ECG: CPT

## 2023-07-31 ENCOUNTER — APPOINTMENT (OUTPATIENT)
Dept: CARDIAC REHAB | Facility: HOSPITAL | Age: 43
End: 2023-07-31
Attending: INTERNAL MEDICINE
Payer: COMMERCIAL

## 2023-07-31 PROCEDURE — 93798 PHYS/QHP OP CAR RHAB W/ECG: CPT

## 2023-08-02 ENCOUNTER — APPOINTMENT (OUTPATIENT)
Dept: CARDIAC REHAB | Facility: HOSPITAL | Age: 43
End: 2023-08-02
Attending: INTERNAL MEDICINE
Payer: COMMERCIAL

## 2023-08-03 ENCOUNTER — CARDPULM VISIT (OUTPATIENT)
Dept: CARDIAC REHAB | Facility: HOSPITAL | Age: 43
End: 2023-08-03
Attending: INTERNAL MEDICINE
Payer: COMMERCIAL

## 2023-08-03 PROCEDURE — 93798 PHYS/QHP OP CAR RHAB W/ECG: CPT

## 2023-08-04 ENCOUNTER — APPOINTMENT (OUTPATIENT)
Dept: CARDIAC REHAB | Facility: HOSPITAL | Age: 43
End: 2023-08-04
Attending: INTERNAL MEDICINE
Payer: COMMERCIAL

## 2023-08-07 ENCOUNTER — ANESTHESIA (OUTPATIENT)
Dept: CARDIAC SURGERY | Facility: HOSPITAL | Age: 43
End: 2023-08-07
Payer: COMMERCIAL

## 2023-08-07 ENCOUNTER — ANESTHESIA EVENT (OUTPATIENT)
Dept: CARDIAC SURGERY | Facility: HOSPITAL | Age: 43
End: 2023-08-07
Payer: COMMERCIAL

## 2023-08-07 ENCOUNTER — APPOINTMENT (OUTPATIENT)
Dept: CARDIAC REHAB | Facility: HOSPITAL | Age: 43
End: 2023-08-07
Attending: INTERNAL MEDICINE
Payer: COMMERCIAL

## 2023-08-07 ENCOUNTER — HOSPITAL ENCOUNTER (OUTPATIENT)
Facility: HOSPITAL | Age: 43
Discharge: HOME | End: 2023-08-07
Attending: THORACIC SURGERY (CARDIOTHORACIC VASCULAR SURGERY) | Admitting: THORACIC SURGERY (CARDIOTHORACIC VASCULAR SURGERY)
Payer: COMMERCIAL

## 2023-08-07 VITALS
OXYGEN SATURATION: 99 % | RESPIRATION RATE: 15 BRPM | TEMPERATURE: 97 F | WEIGHT: 250 LBS | SYSTOLIC BLOOD PRESSURE: 117 MMHG | HEART RATE: 77 BPM | DIASTOLIC BLOOD PRESSURE: 95 MMHG | HEIGHT: 67 IN | BODY MASS INDEX: 39.24 KG/M2

## 2023-08-07 DIAGNOSIS — Z01.818 PRE-OP TESTING: Primary | ICD-10-CM

## 2023-08-07 LAB — SARS-COV-2 RNA RESP QL NAA+PROBE: NOT DETECTED

## 2023-08-07 PROCEDURE — 87205 SMEAR GRAM STAIN: CPT | Performed by: THORACIC SURGERY (CARDIOTHORACIC VASCULAR SURGERY)

## 2023-08-07 PROCEDURE — 0PP004Z REMOVAL OF INTERNAL FIXATION DEVICE FROM STERNUM, OPEN APPROACH: ICD-10-PCS | Performed by: THORACIC SURGERY (CARDIOTHORACIC VASCULAR SURGERY)

## 2023-08-07 PROCEDURE — 87075 CULTR BACTERIA EXCEPT BLOOD: CPT | Performed by: THORACIC SURGERY (CARDIOTHORACIC VASCULAR SURGERY)

## 2023-08-07 PROCEDURE — 87070 CULTURE OTHR SPECIMN AEROBIC: CPT | Performed by: THORACIC SURGERY (CARDIOTHORACIC VASCULAR SURGERY)

## 2023-08-07 RX ORDER — MEPERIDINE HYDROCHLORIDE 25 MG/ML
12.5 INJECTION INTRAMUSCULAR; INTRAVENOUS; SUBCUTANEOUS AS NEEDED
Status: DISCONTINUED | OUTPATIENT
Start: 2023-08-07 | End: 2023-08-07

## 2023-08-07 RX ORDER — DEXAMETHASONE SODIUM PHOSPHATE 4 MG/ML
VIAL (ML) INJECTION AS NEEDED
Status: DISCONTINUED | OUTPATIENT
Start: 2023-08-07 | End: 2023-08-07 | Stop reason: SURG

## 2023-08-07 RX ORDER — NALOXONE HYDROCHLORIDE 0.4 MG/ML
80 INJECTION, SOLUTION INTRAMUSCULAR; INTRAVENOUS; SUBCUTANEOUS AS NEEDED
Status: DISCONTINUED | OUTPATIENT
Start: 2023-08-07 | End: 2023-08-07

## 2023-08-07 RX ORDER — BUPIVACAINE HYDROCHLORIDE 5 MG/ML
INJECTION, SOLUTION EPIDURAL; INTRACAUDAL AS NEEDED
Status: DISCONTINUED | OUTPATIENT
Start: 2023-08-07 | End: 2023-08-07 | Stop reason: HOSPADM

## 2023-08-07 RX ORDER — HYDROMORPHONE HYDROCHLORIDE 1 MG/ML
0.6 INJECTION, SOLUTION INTRAMUSCULAR; INTRAVENOUS; SUBCUTANEOUS EVERY 5 MIN PRN
Status: DISCONTINUED | OUTPATIENT
Start: 2023-08-07 | End: 2023-08-07

## 2023-08-07 RX ORDER — HYDROMORPHONE HYDROCHLORIDE 1 MG/ML
0.2 INJECTION, SOLUTION INTRAMUSCULAR; INTRAVENOUS; SUBCUTANEOUS EVERY 5 MIN PRN
Status: DISCONTINUED | OUTPATIENT
Start: 2023-08-07 | End: 2023-08-07

## 2023-08-07 RX ORDER — CEPHALEXIN 500 MG/1
500 CAPSULE ORAL 3 TIMES DAILY
Qty: 21 CAPSULE | Refills: 0 | Status: SHIPPED | OUTPATIENT
Start: 2023-08-07 | End: 2023-08-14

## 2023-08-07 RX ORDER — LIDOCAINE HYDROCHLORIDE 10 MG/ML
INJECTION, SOLUTION EPIDURAL; INFILTRATION; INTRACAUDAL; PERINEURAL AS NEEDED
Status: DISCONTINUED | OUTPATIENT
Start: 2023-08-07 | End: 2023-08-07 | Stop reason: SURG

## 2023-08-07 RX ORDER — ACETAMINOPHEN 500 MG
1000 TABLET ORAL ONCE AS NEEDED
Status: COMPLETED | OUTPATIENT
Start: 2023-08-07 | End: 2023-08-07

## 2023-08-07 RX ORDER — MIDAZOLAM HYDROCHLORIDE 1 MG/ML
1 INJECTION INTRAMUSCULAR; INTRAVENOUS EVERY 5 MIN PRN
Status: DISCONTINUED | OUTPATIENT
Start: 2023-08-07 | End: 2023-08-07

## 2023-08-07 RX ORDER — SODIUM CHLORIDE 9 MG/ML
INJECTION, SOLUTION INTRAVENOUS CONTINUOUS PRN
Status: DISCONTINUED | OUTPATIENT
Start: 2023-08-07 | End: 2023-08-07 | Stop reason: SURG

## 2023-08-07 RX ORDER — HYDROCODONE BITARTRATE AND ACETAMINOPHEN 5; 325 MG/1; MG/1
1 TABLET ORAL ONCE AS NEEDED
Status: COMPLETED | OUTPATIENT
Start: 2023-08-07 | End: 2023-08-07

## 2023-08-07 RX ORDER — ONDANSETRON 2 MG/ML
4 INJECTION INTRAMUSCULAR; INTRAVENOUS EVERY 6 HOURS PRN
Status: DISCONTINUED | OUTPATIENT
Start: 2023-08-07 | End: 2023-08-07

## 2023-08-07 RX ORDER — ROCURONIUM BROMIDE 10 MG/ML
INJECTION, SOLUTION INTRAVENOUS AS NEEDED
Status: DISCONTINUED | OUTPATIENT
Start: 2023-08-07 | End: 2023-08-07 | Stop reason: SURG

## 2023-08-07 RX ORDER — HYDROCODONE BITARTRATE AND ACETAMINOPHEN 5; 325 MG/1; MG/1
2 TABLET ORAL ONCE AS NEEDED
Status: COMPLETED | OUTPATIENT
Start: 2023-08-07 | End: 2023-08-07

## 2023-08-07 RX ORDER — CEFAZOLIN SODIUM/WATER 2 G/20 ML
2 SYRINGE (ML) INTRAVENOUS
Status: COMPLETED | OUTPATIENT
Start: 2023-08-08 | End: 2023-08-07

## 2023-08-07 RX ORDER — SODIUM CHLORIDE, SODIUM LACTATE, POTASSIUM CHLORIDE, CALCIUM CHLORIDE 600; 310; 30; 20 MG/100ML; MG/100ML; MG/100ML; MG/100ML
INJECTION, SOLUTION INTRAVENOUS CONTINUOUS
Status: DISCONTINUED | OUTPATIENT
Start: 2023-08-07 | End: 2023-08-07

## 2023-08-07 RX ORDER — ONDANSETRON 2 MG/ML
INJECTION INTRAMUSCULAR; INTRAVENOUS AS NEEDED
Status: DISCONTINUED | OUTPATIENT
Start: 2023-08-07 | End: 2023-08-07 | Stop reason: SURG

## 2023-08-07 RX ORDER — HYDROMORPHONE HYDROCHLORIDE 1 MG/ML
0.4 INJECTION, SOLUTION INTRAMUSCULAR; INTRAVENOUS; SUBCUTANEOUS EVERY 5 MIN PRN
Status: DISCONTINUED | OUTPATIENT
Start: 2023-08-07 | End: 2023-08-07

## 2023-08-07 RX ADMIN — ROCURONIUM BROMIDE 70 MG: 10 INJECTION, SOLUTION INTRAVENOUS at 10:36:00

## 2023-08-07 RX ADMIN — SODIUM CHLORIDE: 9 INJECTION, SOLUTION INTRAVENOUS at 10:28:00

## 2023-08-07 RX ADMIN — CEFAZOLIN SODIUM/WATER 2 G: 2 G/20 ML SYRINGE (ML) INTRAVENOUS at 10:45:00

## 2023-08-07 RX ADMIN — DEXAMETHASONE SODIUM PHOSPHATE 4 MG: 4 MG/ML VIAL (ML) INJECTION at 10:34:00

## 2023-08-07 RX ADMIN — LIDOCAINE HYDROCHLORIDE 50 MG: 10 INJECTION, SOLUTION EPIDURAL; INFILTRATION; INTRACAUDAL; PERINEURAL at 10:34:00

## 2023-08-07 RX ADMIN — ONDANSETRON 4 MG: 2 INJECTION INTRAMUSCULAR; INTRAVENOUS at 11:08:00

## 2023-08-07 NOTE — ANESTHESIA PROCEDURE NOTES
Airway  Date/Time: 8/7/2023 10:39 AM  Urgency: elective      General Information and Staff    Patient location during procedure: OR  Anesthesiologist: Vidhya Gudino MD  Performed: anesthesiologist   Performed by: Vidhya Gudino MD  Authorized by: Vidhya Gudino MD      Indications and Patient Condition  Indications for airway management: anesthesia  Sedation level: deep  Preoxygenated: yes  Patient position: sniffing  Mask difficulty assessment: 1 - vent by mask    Final Airway Details  Final airway type: endotracheal airway      Successful airway: ETT  Cuffed: yes   Successful intubation technique: Video laryngoscopy  Endotracheal tube insertion site: oral  Blade: GlideScope  Blade size: #3  ETT size (mm): 7.0    Placement verified by: capnometry   Cuff volume (mL): 7  Measured from: lips  ETT to lips (cm): 21  Number of attempts at approach: 1    Additional Comments  atraumatic

## 2023-08-07 NOTE — ANESTHESIA POSTPROCEDURE EVALUATION
[address] [address] Patient Status:  Outpatient in a Bed   Age/Gender 38year old female MRN [id number]   Location [facility] Attending Anila Dunn MD   Hosp Day # 0 PCP Estuardo Fay DO       Anesthesia Post-op Note    REMOVAL OF STERNAL HARDWARE    Procedure Summary       Date: 08/07/23 Room / Location: [facility] 02 / 1404 [facility]    Anesthesia Start: 7043 Anesthesia Stop: 6705    Procedure: REMOVAL OF STERNAL HARDWARE Diagnosis: (Protruding Sternal Hardware)    Surgeons: Anila Dunn MD Anesthesiologist: Liberty Sims MD    Anesthesia Type: general ASA Status: 3            Anesthesia Type: general    Vitals Value Taken Time   /80 08/07/23 1130   Temp 97 08/07/23 1134   Pulse 85 08/07/23 1132   Resp 26 08/07/23 1132   SpO2 100 % 08/07/23 1132   Vitals shown include unvalidated device data. Patient Location: PACU    Anesthesia Type: general    Airway Patency: patent    Postop Pain Control: adequate    Mental Status: mildly sedated but able to meaningfully participate in the post-anesthesia evaluation    Nausea/Vomiting: none    Cardiopulmonary/Hydration status: stable euvolemic    Complications: no apparent anesthesia related complications    Postop vital signs: stable    Dental Exam: Unchanged from Preop    Patient to be discharged from PACU when criteria met.

## 2023-08-08 NOTE — OPERATIVE REPORT
[address] [place]    PATIENT'S NAME: Sherley Leary   ATTENDING PHYSICIAN: Walter Cameron MD   OPERATING PHYSICIAN: Walter Cameron MD   PATIENT ACCOUNT#:   [de-identified]    LOCATION:  [address]  MEDICAL RECORD #:   [id number]       YOB: 1980  ADMISSION DATE:       08/07/2023      OPERATION DATE:  08/07/2023    OPERATIVE REPORT    PREOPERATIVE DIAGNOSIS:  Painful sternal hardware. POSTOPERATIVE DIAGNOSIS:  Painful sternal hardware. PROCEDURE:  Removal of painful sternal hardware. ASSISTANT:  Brennan Montes PA-C.     INDICATIONS:  The patient is a very pleasant young female who had heart surgery with us approximately 4 months ago. The sternal hardware in the upper half of the sternum is significantly painful, and removal has been recommended. There is no evidence of infection or dehiscence. OPERATIVE TECHNIQUE:  Light general endotracheal anesthesia was induced. The upper chest was prepped and draped. An approximately 3-inch long incision was made over the previous sternotomy incision. This was carried down. There was a little bit of turbinate fluid which I cultured and sent for stat Gram stain. Gram stain showed no bacteria, no white cells. I removed 2 double-stranded sternal wires. Additionally, I removed a boomerang-shaped sternal plate as well as 8-hole plate, noting that the 8-hole plate had only 6 screws in it. So we removed 2 wires, 2 plates, and 10 screws. The area was irrigated with antibiotic solution and closed in layers after infiltrating with Marcaine. The patient tolerated the procedure well. I spoke on my cell phone with the patient's . Instructions, including prescriptions for pain medication and antibiotics, were given.     Dictated By Walter Cameron MD  d: 08/07/2023 12:44:43  t: 08/07/2023 17:10:17  Job [id number]  [place]/

## 2023-08-09 ENCOUNTER — APPOINTMENT (OUTPATIENT)
Dept: CARDIAC REHAB | Facility: HOSPITAL | Age: 43
End: 2023-08-09
Payer: COMMERCIAL

## 2023-08-09 ENCOUNTER — APPOINTMENT (OUTPATIENT)
Dept: CARDIAC REHAB | Facility: HOSPITAL | Age: 43
End: 2023-08-09
Attending: INTERNAL MEDICINE
Payer: COMMERCIAL

## 2023-08-10 ENCOUNTER — APPOINTMENT (OUTPATIENT)
Dept: CARDIAC REHAB | Facility: HOSPITAL | Age: 43
End: 2023-08-10
Payer: COMMERCIAL

## 2023-08-11 ENCOUNTER — APPOINTMENT (OUTPATIENT)
Dept: CARDIAC REHAB | Facility: HOSPITAL | Age: 43
End: 2023-08-11
Attending: INTERNAL MEDICINE
Payer: COMMERCIAL

## 2023-08-14 ENCOUNTER — APPOINTMENT (OUTPATIENT)
Dept: CARDIAC REHAB | Facility: HOSPITAL | Age: 43
End: 2023-08-14
Payer: COMMERCIAL

## 2023-08-14 ENCOUNTER — APPOINTMENT (OUTPATIENT)
Dept: CARDIAC REHAB | Facility: HOSPITAL | Age: 43
End: 2023-08-14
Attending: INTERNAL MEDICINE
Payer: COMMERCIAL

## 2023-08-16 ENCOUNTER — APPOINTMENT (OUTPATIENT)
Dept: CARDIAC REHAB | Facility: HOSPITAL | Age: 43
End: 2023-08-16
Attending: INTERNAL MEDICINE
Payer: COMMERCIAL

## 2023-08-16 ENCOUNTER — APPOINTMENT (OUTPATIENT)
Dept: CARDIAC REHAB | Facility: HOSPITAL | Age: 43
End: 2023-08-16
Payer: COMMERCIAL

## 2023-08-17 ENCOUNTER — APPOINTMENT (OUTPATIENT)
Dept: CARDIAC REHAB | Facility: HOSPITAL | Age: 43
End: 2023-08-17
Payer: COMMERCIAL

## 2023-08-18 ENCOUNTER — APPOINTMENT (OUTPATIENT)
Dept: CARDIAC REHAB | Facility: HOSPITAL | Age: 43
End: 2023-08-18
Attending: INTERNAL MEDICINE
Payer: COMMERCIAL

## 2023-08-21 ENCOUNTER — APPOINTMENT (OUTPATIENT)
Dept: CARDIAC REHAB | Facility: HOSPITAL | Age: 43
End: 2023-08-21
Payer: COMMERCIAL

## 2023-08-21 ENCOUNTER — APPOINTMENT (OUTPATIENT)
Dept: CARDIAC REHAB | Facility: HOSPITAL | Age: 43
End: 2023-08-21
Attending: INTERNAL MEDICINE
Payer: COMMERCIAL

## 2023-08-21 DIAGNOSIS — E06.3 HYPOTHYROIDISM DUE TO HASHIMOTO'S THYROIDITIS: ICD-10-CM

## 2023-08-21 DIAGNOSIS — E03.8 HYPOTHYROIDISM DUE TO HASHIMOTO'S THYROIDITIS: ICD-10-CM

## 2023-08-22 NOTE — TELEPHONE ENCOUNTER
A refill request was received for:  Requested Prescriptions     Pending Prescriptions Disp Refills    LEVOTHYROXINE 200 MCG Oral Tab [Pharmacy Med Name: LEVOTHYROXINE 0.2MG (200MCG) TAB] 90 tablet 0     Sig: TAKE 1 TABLET(200 MCG) BY MOUTH BEFORE BREAKFAST         Component  Ref Range & Units 11/8/22 11:22 AM   T3 Free  2.40 - 4.20 pg/mL 3.21         Component  Ref Range & Units 11/8/22 11:22 AM   Free T4  0.8 - 1.7 ng/dL 1.3        Last refill date:5/17/2023       Last office visit:5/15/2023     Follow up due:  Future Appointments   Date Time Provider Sidney Padron   8/23/2023  7:00 AM BRYAN Vargas [facility]   8/23/2023  4:30 PM [facility] CARDIAC REHAB ROOM 1 [address]   8/24/2023  4:30 PM [facility] CARDIAC REHAB ROOM 1 [address]   8/28/2023  4:30 PM [facility] CARDIAC REHAB ROOM 1 [address]   8/30/2023  4:30 PM [facility] CARDIAC REHAB ROOM 1 [address]   8/31/2023  4:30 PM [facility] CARDIAC REHAB ROOM 1 [address]   9/6/2023  4:30 PM [facility] CARDIAC REHAB ROOM 1 [address]   9/7/2023  4:30 PM [facility] CARDIAC REHAB ROOM 1 [address]   9/11/2023  4:30 PM [facility] CARDIAC REHAB ROOM 1 [address]

## 2023-08-23 ENCOUNTER — APPOINTMENT (OUTPATIENT)
Dept: CARDIAC REHAB | Facility: HOSPITAL | Age: 43
End: 2023-08-23
Attending: INTERNAL MEDICINE
Payer: COMMERCIAL

## 2023-08-23 RX ORDER — LEVOTHYROXINE SODIUM 0.2 MG/1
200 TABLET ORAL
Qty: 90 TABLET | Refills: 0 | Status: SHIPPED | OUTPATIENT
Start: 2023-08-23

## 2023-08-24 ENCOUNTER — APPOINTMENT (OUTPATIENT)
Dept: CARDIAC REHAB | Facility: HOSPITAL | Age: 43
End: 2023-08-24
Payer: COMMERCIAL

## 2023-08-25 ENCOUNTER — APPOINTMENT (OUTPATIENT)
Dept: CARDIAC REHAB | Facility: HOSPITAL | Age: 43
End: 2023-08-25
Attending: INTERNAL MEDICINE
Payer: COMMERCIAL

## 2023-08-28 ENCOUNTER — APPOINTMENT (OUTPATIENT)
Dept: CARDIAC REHAB | Facility: HOSPITAL | Age: 43
End: 2023-08-28
Attending: INTERNAL MEDICINE
Payer: COMMERCIAL

## 2023-08-30 ENCOUNTER — APPOINTMENT (OUTPATIENT)
Dept: CARDIAC REHAB | Facility: HOSPITAL | Age: 43
End: 2023-08-30
Attending: INTERNAL MEDICINE
Payer: COMMERCIAL

## 2023-08-30 ENCOUNTER — APPOINTMENT (OUTPATIENT)
Dept: CARDIAC REHAB | Facility: HOSPITAL | Age: 43
End: 2023-08-30
Payer: COMMERCIAL

## 2023-08-31 ENCOUNTER — APPOINTMENT (OUTPATIENT)
Dept: CARDIAC REHAB | Facility: HOSPITAL | Age: 43
End: 2023-08-31
Payer: COMMERCIAL

## 2023-09-01 ENCOUNTER — APPOINTMENT (OUTPATIENT)
Dept: CARDIAC REHAB | Facility: HOSPITAL | Age: 43
End: 2023-09-01
Attending: INTERNAL MEDICINE
Payer: COMMERCIAL

## 2023-09-06 ENCOUNTER — CARDPULM VISIT (OUTPATIENT)
Dept: CARDIAC REHAB | Facility: HOSPITAL | Age: 43
End: 2023-09-06
Attending: INTERNAL MEDICINE
Payer: COMMERCIAL

## 2023-09-06 PROCEDURE — 93798 PHYS/QHP OP CAR RHAB W/ECG: CPT

## 2023-09-07 ENCOUNTER — CARDPULM VISIT (OUTPATIENT)
Dept: CARDIAC REHAB | Facility: HOSPITAL | Age: 43
End: 2023-09-07
Attending: INTERNAL MEDICINE
Payer: COMMERCIAL

## 2023-09-07 PROCEDURE — 93798 PHYS/QHP OP CAR RHAB W/ECG: CPT

## 2023-09-09 ENCOUNTER — HOSPITAL ENCOUNTER (OUTPATIENT)
Facility: HOSPITAL | Age: 43
Setting detail: OBSERVATION
Discharge: HOME | End: 2023-09-10
Attending: EMERGENCY MEDICINE | Admitting: INTERNAL MEDICINE
Payer: COMMERCIAL

## 2023-09-09 ENCOUNTER — APPOINTMENT (OUTPATIENT)
Dept: GENERAL RADIOLOGY | Facility: HOSPITAL | Age: 43
End: 2023-09-09
Attending: EMERGENCY MEDICINE
Payer: COMMERCIAL

## 2023-09-09 DIAGNOSIS — R07.9 CHEST PAIN OF UNCERTAIN ETIOLOGY: Primary | ICD-10-CM

## 2023-09-09 LAB
ALBUMIN SERPL-MCNC: 3.7 G/DL (ref 3.4–5)
ALBUMIN/GLOB SERPL: 1 {RATIO} (ref 1–2)
ALP LIVER SERPL-CCNC: 96 U/L
ALT SERPL-CCNC: 27 U/L
ANION GAP SERPL CALC-SCNC: 4 MMOL/L (ref 0–18)
AST SERPL-CCNC: 18 U/L (ref 15–37)
BASOPHILS # BLD AUTO: 0.01 X10(3) UL (ref 0–0.2)
BASOPHILS NFR BLD AUTO: 0.1 %
BILIRUB SERPL-MCNC: 0.3 MG/DL (ref 0.1–2)
BUN BLD-MCNC: 17 MG/DL (ref 7–18)
CALCIUM BLD-MCNC: 9 MG/DL (ref 8.5–10.1)
CHLORIDE SERPL-SCNC: 108 MMOL/L (ref 98–112)
CO2 SERPL-SCNC: 28 MMOL/L (ref 21–32)
CREAT BLD-MCNC: 0.86 MG/DL
CRP SERPL-MCNC: 1.14 MG/DL (ref ?–0.3)
D DIMER PPP FEU-MCNC: 0.42 UG/ML FEU (ref ?–0.5)
EGFRCR SERPLBLD CKD-EPI 2021: 88 ML/MIN/1.73M2 (ref 60–?)
EOSINOPHIL # BLD AUTO: 0.01 X10(3) UL (ref 0–0.7)
EOSINOPHIL NFR BLD AUTO: 0.1 %
ERYTHROCYTE [DISTWIDTH] IN BLOOD BY AUTOMATED COUNT: 15.6 %
GLOBULIN PLAS-MCNC: 3.6 G/DL (ref 2.8–4.4)
GLUCOSE BLD-MCNC: 80 MG/DL (ref 70–99)
HCT VFR BLD AUTO: 40.1 %
HGB BLD-MCNC: 13.2 G/DL
IMM GRANULOCYTES # BLD AUTO: 0.02 X10(3) UL (ref 0–1)
IMM GRANULOCYTES NFR BLD: 0.3 %
INR BLD: 0.94 (ref 0.85–1.16)
LYMPHOCYTES # BLD AUTO: 1.2 X10(3) UL (ref 1–4)
LYMPHOCYTES NFR BLD AUTO: 16.8 %
MAGNESIUM SERPL-MCNC: 2.2 MG/DL (ref 1.6–2.6)
MCH RBC QN AUTO: 27.4 PG (ref 26–34)
MCHC RBC AUTO-ENTMCNC: 32.9 G/DL (ref 31–37)
MCV RBC AUTO: 83.4 FL
MONOCYTES # BLD AUTO: 0.61 X10(3) UL (ref 0.1–1)
MONOCYTES NFR BLD AUTO: 8.5 %
NEUTROPHILS # BLD AUTO: 5.29 X10 (3) UL (ref 1.5–7.7)
NEUTROPHILS # BLD AUTO: 5.29 X10(3) UL (ref 1.5–7.7)
NEUTROPHILS NFR BLD AUTO: 74.2 %
NT-PROBNP SERPL-MCNC: 248 PG/ML (ref ?–125)
OSMOLALITY SERPL CALC.SUM OF ELEC: 291 MOSM/KG (ref 275–295)
PLATELET # BLD AUTO: 204 10(3)UL (ref 150–450)
POTASSIUM SERPL-SCNC: 4.2 MMOL/L (ref 3.5–5.1)
PROT SERPL-MCNC: 7.3 G/DL (ref 6.4–8.2)
PROTHROMBIN TIME: 12.6 SECONDS (ref 11.6–14.8)
RBC # BLD AUTO: 4.81 X10(6)UL
SODIUM SERPL-SCNC: 140 MMOL/L (ref 136–145)
TROPONIN I HIGH SENSITIVITY: 19 NG/L
TROPONIN I HIGH SENSITIVITY: 21 NG/L
WBC # BLD AUTO: 7.1 X10(3) UL (ref 4–11)

## 2023-09-09 PROCEDURE — 99223 1ST HOSP IP/OBS HIGH 75: CPT | Performed by: HOSPITALIST

## 2023-09-09 PROCEDURE — 71045 X-RAY EXAM CHEST 1 VIEW: CPT | Performed by: EMERGENCY MEDICINE

## 2023-09-09 RX ORDER — MELATONIN
3 NIGHTLY PRN
Status: DISCONTINUED | OUTPATIENT
Start: 2023-09-09 | End: 2023-09-10

## 2023-09-09 RX ORDER — POLYETHYLENE GLYCOL 3350 17 G/17G
17 POWDER, FOR SOLUTION ORAL DAILY PRN
Status: DISCONTINUED | OUTPATIENT
Start: 2023-09-09 | End: 2023-09-10

## 2023-09-09 RX ORDER — LEVOTHYROXINE SODIUM 0.2 MG/1
200 TABLET ORAL
Status: DISCONTINUED | OUTPATIENT
Start: 2023-09-10 | End: 2023-09-10

## 2023-09-09 RX ORDER — BUSPIRONE HYDROCHLORIDE 5 MG/1
15 TABLET ORAL 2 TIMES DAILY
Status: DISCONTINUED | OUTPATIENT
Start: 2023-09-09 | End: 2023-09-10

## 2023-09-09 RX ORDER — MORPHINE SULFATE 2 MG/ML
1 INJECTION, SOLUTION INTRAMUSCULAR; INTRAVENOUS EVERY 2 HOUR PRN
Status: DISCONTINUED | OUTPATIENT
Start: 2023-09-09 | End: 2023-09-10

## 2023-09-09 RX ORDER — ALPRAZOLAM 0.25 MG/1
0.25 TABLET ORAL 3 TIMES DAILY PRN
Status: DISCONTINUED | OUTPATIENT
Start: 2023-09-09 | End: 2023-09-10

## 2023-09-09 RX ORDER — ROSUVASTATIN CALCIUM 20 MG/1
20 TABLET, COATED ORAL NIGHTLY
COMMUNITY

## 2023-09-09 RX ORDER — MORPHINE SULFATE 4 MG/ML
4 INJECTION, SOLUTION INTRAMUSCULAR; INTRAVENOUS EVERY 2 HOUR PRN
Status: DISCONTINUED | OUTPATIENT
Start: 2023-09-09 | End: 2023-09-10

## 2023-09-09 RX ORDER — CLOPIDOGREL BISULFATE 75 MG/1
75 TABLET ORAL DAILY
Status: DISCONTINUED | OUTPATIENT
Start: 2023-09-10 | End: 2023-09-10

## 2023-09-09 RX ORDER — ASPIRIN 81 MG/1
81 TABLET ORAL DAILY
Status: DISCONTINUED | OUTPATIENT
Start: 2023-09-10 | End: 2023-09-10

## 2023-09-09 RX ORDER — ONDANSETRON 2 MG/ML
4 INJECTION INTRAMUSCULAR; INTRAVENOUS EVERY 6 HOURS PRN
Status: DISCONTINUED | OUTPATIENT
Start: 2023-09-09 | End: 2023-09-10

## 2023-09-09 RX ORDER — METOPROLOL TARTRATE 50 MG/1
50 TABLET, FILM COATED ORAL
Status: DISCONTINUED | OUTPATIENT
Start: 2023-09-10 | End: 2023-09-10

## 2023-09-09 RX ORDER — HYDROCODONE BITARTRATE AND ACETAMINOPHEN 5; 325 MG/1; MG/1
1-2 TABLET ORAL EVERY 6 HOURS PRN
Status: DISCONTINUED | OUTPATIENT
Start: 2023-09-09 | End: 2023-09-10

## 2023-09-09 RX ORDER — MORPHINE SULFATE 2 MG/ML
2 INJECTION, SOLUTION INTRAMUSCULAR; INTRAVENOUS EVERY 2 HOUR PRN
Status: DISCONTINUED | OUTPATIENT
Start: 2023-09-09 | End: 2023-09-10

## 2023-09-09 RX ORDER — ENOXAPARIN SODIUM 100 MG/ML
0.5 INJECTION SUBCUTANEOUS DAILY
Status: DISCONTINUED | OUTPATIENT
Start: 2023-09-10 | End: 2023-09-10

## 2023-09-09 RX ORDER — LOSARTAN POTASSIUM 25 MG/1
25 TABLET ORAL DAILY
Status: DISCONTINUED | OUTPATIENT
Start: 2023-09-10 | End: 2023-09-10

## 2023-09-09 RX ORDER — PROCHLORPERAZINE EDISYLATE 5 MG/ML
5 INJECTION INTRAMUSCULAR; INTRAVENOUS EVERY 8 HOURS PRN
Status: DISCONTINUED | OUTPATIENT
Start: 2023-09-09 | End: 2023-09-10

## 2023-09-09 RX ORDER — SENNOSIDES 8.6 MG
17.2 TABLET ORAL NIGHTLY PRN
Status: DISCONTINUED | OUTPATIENT
Start: 2023-09-09 | End: 2023-09-10

## 2023-09-09 RX ORDER — ENEMA 19; 7 G/133ML; G/133ML
1 ENEMA RECTAL ONCE AS NEEDED
Status: DISCONTINUED | OUTPATIENT
Start: 2023-09-09 | End: 2023-09-10

## 2023-09-09 RX ORDER — BISACODYL 10 MG
10 SUPPOSITORY, RECTAL RECTAL
Status: DISCONTINUED | OUTPATIENT
Start: 2023-09-09 | End: 2023-09-10

## 2023-09-09 RX ORDER — VENLAFAXINE HYDROCHLORIDE 75 MG/1
150 CAPSULE, EXTENDED RELEASE ORAL 2 TIMES DAILY
Status: DISCONTINUED | OUTPATIENT
Start: 2023-09-09 | End: 2023-09-10

## 2023-09-09 RX ORDER — NITROGLYCERIN 0.4 MG/1
0.4 TABLET SUBLINGUAL EVERY 5 MIN PRN
Status: DISCONTINUED | OUTPATIENT
Start: 2023-09-09 | End: 2023-09-10

## 2023-09-09 RX ORDER — ACETAMINOPHEN 500 MG
500 TABLET ORAL EVERY 4 HOURS PRN
Status: DISCONTINUED | OUTPATIENT
Start: 2023-09-09 | End: 2023-09-10

## 2023-09-10 VITALS
BODY MASS INDEX: 41.11 KG/M2 | WEIGHT: 261.94 LBS | RESPIRATION RATE: 18 BRPM | OXYGEN SATURATION: 97 % | HEART RATE: 73 BPM | SYSTOLIC BLOOD PRESSURE: 139 MMHG | TEMPERATURE: 98 F | HEIGHT: 67 IN | DIASTOLIC BLOOD PRESSURE: 96 MMHG

## 2023-09-10 PROBLEM — E66.01 MORBID OBESITY WITH BMI OF 40.0-44.9, ADULT (HCC): Status: ACTIVE | Noted: 2023-05-15

## 2023-09-10 LAB
ATRIAL RATE: 74 BPM
ATRIAL RATE: 88 BPM
P AXIS: 20 DEGREES
P AXIS: 52 DEGREES
P-R INTERVAL: 152 MS
P-R INTERVAL: 164 MS
Q-T INTERVAL: 388 MS
Q-T INTERVAL: 416 MS
QRS DURATION: 94 MS
QRS DURATION: 96 MS
QTC CALCULATION (BEZET): 461 MS
QTC CALCULATION (BEZET): 469 MS
R AXIS: 43 DEGREES
R AXIS: 44 DEGREES
T AXIS: 86 DEGREES
T AXIS: 89 DEGREES
TROPONIN I HIGH SENSITIVITY: 20 NG/L
VENTRICULAR RATE: 74 BPM
VENTRICULAR RATE: 88 BPM

## 2023-09-10 PROCEDURE — 99239 HOSP IP/OBS DSCHRG MGMT >30: CPT | Performed by: HOSPITALIST

## 2023-09-10 RX ORDER — LOSARTAN POTASSIUM 25 MG/1
50 TABLET ORAL DAILY
Status: DISCONTINUED | OUTPATIENT
Start: 2023-09-10 | End: 2023-09-10

## 2023-09-10 RX ORDER — AMLODIPINE BESYLATE 5 MG/1
5 TABLET ORAL DAILY
Status: DISCONTINUED | OUTPATIENT
Start: 2023-09-10 | End: 2023-09-10

## 2023-09-10 RX ORDER — ROSUVASTATIN CALCIUM 20 MG/1
20 TABLET, COATED ORAL NIGHTLY
Status: DISCONTINUED | OUTPATIENT
Start: 2023-09-10 | End: 2023-09-10

## 2023-09-10 RX ORDER — LOSARTAN POTASSIUM 50 MG/1
50 TABLET ORAL DAILY
Qty: 30 TABLET | Refills: 0 | Status: SHIPPED | OUTPATIENT
Start: 2023-09-11 | End: 2023-10-11

## 2023-09-10 RX ORDER — AMLODIPINE BESYLATE 5 MG/1
5 TABLET ORAL DAILY
Qty: 30 TABLET | Refills: 0 | Status: SHIPPED | OUTPATIENT
Start: 2023-09-11 | End: 2023-10-11

## 2023-09-10 RX ORDER — METOPROLOL TARTRATE 50 MG/1
50 TABLET, FILM COATED ORAL
Status: DISCONTINUED | OUTPATIENT
Start: 2023-09-10 | End: 2023-09-10

## 2023-09-10 NOTE — PLAN OF CARE
Jey Parada Patient Status:  Emergency    1980 MRN [id number]   Location [facility] Attending Kathy Costa MD   Hosp Day # 0 PCP Rusty Rodriguez DO     Cardiology Nocturnal APN Note    Page Received: ED MD     Briefly: (Documentation from chart review)     Lit Lu is a 39 yo F with PMH/PSH of NSTEMI s/p PCI then CABGx3 2023 and s/p sternal hardware removal as superior aspect of incision d/t pain who resumed cardiac rehab this week presents to the ED c/o chest pain starting this morning associated with diaphoresis and intermittent throughout the day. The patient states that she did play volleyball with her daughter today, but that she is R handed and pain is in a semi [place] above her L breast.  In the ED, EKG NSR, troponin 19 -> 21, pBNP 248. Cardiology consulted for chest pain. In the ED, patient received SL ntg without relief. Primary Cardiologist Dr Cade Woods, last OV 23     Vital Signs:       2023     9:00 PM 2023     9:15 PM   Vitals History   /109 153/103   Pulse 82 91   Resp 24 20   SpO2 99 % 96 %        Labs:   Lab Results   Component Value Date    WBC 7.1 2023    HGB 13.2 2023    HCT 40.1 2023    .0 2023    CREATSERUM 0.86 2023    BUN 17 2023     2023    K 4.2 2023     2023    CO2 28.0 2023    GLU 80 2023    CA 9.0 2023    ALB 3.7 2023    ALKPHO 96 2023    BILT 0.3 2023    TP 7.3 2023    AST 18 2023    ALT 27 2023    INR 0.94 2023    DDIMER 0.42 2023    MG 2.2 2023    TROPHS 19 2023       Diagnostics:   XR CHEST AP PORTABLE  (CPT=71045)    Result Date: 2023  CONCLUSION: No acute cardiopulmonary abnormality.    LOCATION:  [facility]      Dictated by (CST): Consuelo Zelaya MD on 2023 at 7:46 PM     Finalized by (CST): Consuelo Zelaya MD on 2023 at 7:46 PM Allergies:    Levaquin [Levofloxa*    DIARRHEA    Medications:    nitroglycerin    sodium chloride    Assessment:   #Chest pain s/p PCI then CABGx3 4/2023, sternal hardware removal 8/2023 and resumption of cardiac rehab; troponin negative; d-dimer negative   #CAD s/p PCI then CABGx3 on ASA / plavix  #HTN on losratan, metoprolol  #HLD on statin  #Hypothyroid on synthroid     Plan:  - Third troponin / EKG in AM  - Add on CRP   - Continue home medications  - Continue to monitor overnight  - Formal Cardiology consult to follow in [place], [address]  9/9/2023  9:32 PM

## 2023-09-10 NOTE — ED QUICK NOTES
Orders for admission, patient is aware of plan and ready to go upstairs. Any questions, please call ED RN Hannah at extension [phone].      Patient Covid vaccination status: Fully vaccinated     COVID Test Ordered in ED: None    COVID Suspicion at Admission: N/A    Running Infusions:  0.9 bolus per [facility]    Mental Status/LOC at time of transport: A&Ox3    Other pertinent information:   CIWA score: N/A   NIH score:  N/A

## 2023-09-10 NOTE — PLAN OF CARE
Assumed care of patient 0730 resting in bed. AO x4. NSR on tele monitor. O2 sat adequate on RA. Denies chest pain and shortness of breath. Plan of care updated. Awaiting hospitalist to clear for discharge. Bed locked, in lowest position. Call light and personal items in reach. All needs met. 1145: discharge instructions reviewed with patient, who acknowledged understanding. Tele box and IV removed. Patient transported to [facility], via wheelchair, with all paperwork and belongings.

## 2023-09-10 NOTE — ED INITIAL ASSESSMENT (HPI)
A&Ox3 patient p/w CP    Patient had recent cardiac bypass surgery on 5/5  Had sternal hardware removed on 8/7    Reports sudden onset of L sided chest soreness tonight after eating dinner    Denies any sob/n/v/d/c/f/LH/vision changes/urinary sx at this time    RR even/NL, speaking in full clear sentences, ambulatory w/ steady gait

## 2023-09-10 NOTE — PROGRESS NOTES
NURSING ADMISSION NOTE      Patient admitted via Cart  Oriented to room. Safety precautions initiated. Bed in low position. Call light in reach. Assumed care for patient at 21 . Admission navigator/PTA med list complete, Patient alert and oriented x4. Up independently. NSR on tele. Maintaining o2 sats on RA. C/o 3/10 achy, dull chest pain, declined pain meds at this time. Updated patient on POC, verbalized understanding.  Cards consulted in ED, to see in am.

## 2023-09-11 ENCOUNTER — APPOINTMENT (OUTPATIENT)
Dept: CARDIAC REHAB | Facility: HOSPITAL | Age: 43
End: 2023-09-11
Attending: INTERNAL MEDICINE
Payer: COMMERCIAL

## 2023-09-11 ENCOUNTER — TELEPHONE (OUTPATIENT)
Dept: FAMILY MEDICINE CLINIC | Facility: CLINIC | Age: 43
End: 2023-09-11

## 2023-09-11 ENCOUNTER — PATIENT OUTREACH (OUTPATIENT)
Dept: CASE MANAGEMENT | Age: 43
End: 2023-09-11

## 2023-09-11 NOTE — TELEPHONE ENCOUNTER
Spoke to pt for TCM today. The patient was recently hospitalized for chest pain. The patient does not have a TCM appointment scheduled at this time. The patient is requesting an appointment with only Dr. Petr Robles and there were no openings available in the TCM time frame. Per guidelines patient should  be seen within seven days by 9/17/2023. Otherwise, last date for TCM: 9/24/2023     Acute/FYI: The patient reported chest pain has returned (1/10 at rest and 5-6/10 with movement). The patient reported symptoms worsened with increased activity at home and at work. The patient denies any SOB but did admit to feeling \"aware\" when taking deep breaths. The patient also c/o having a headache and joint (shoulder/back) pain/full body ache. The patient reported muscular pain has been present since increasing resistance during exercise at cardiac rehab on 9/7/2023 and described all ache/pain a feeling muscular in nature. The patient denies any dizziness/syncope, n/v, palpitations/irregular heart beats, weakness, edema, jaw/arm pain, SOB, coughing, wheezing or fever. [facility] did instruct the patient to return to the ED/call 911 if chest pain has worsened/returned after subsiding prior to the hospital discharge. The patient declined returning to the ED at this time since symptoms feel muscular in nature. [facility] contacted cardiology to report a condition update. Please f/u with the pt and assist with scheduling a TCM/HFU appt. Thank you!

## 2023-09-13 ENCOUNTER — APPOINTMENT (OUTPATIENT)
Dept: CARDIAC REHAB | Facility: HOSPITAL | Age: 43
End: 2023-09-13
Attending: INTERNAL MEDICINE
Payer: COMMERCIAL

## 2023-09-13 NOTE — TELEPHONE ENCOUNTER
Spoke with patient, she states she is seeing her cardiologist at 10:20am tomorrow. Advised pt to call us with an update. LR, please see update, I told patient we would let her know if further follow up rec by you.

## 2023-09-14 ENCOUNTER — APPOINTMENT (OUTPATIENT)
Dept: CARDIAC REHAB | Facility: HOSPITAL | Age: 43
End: 2023-09-14
Payer: COMMERCIAL

## 2023-09-18 ENCOUNTER — APPOINTMENT (OUTPATIENT)
Dept: CARDIAC REHAB | Facility: HOSPITAL | Age: 43
End: 2023-09-18
Payer: COMMERCIAL

## 2023-09-20 ENCOUNTER — APPOINTMENT (OUTPATIENT)
Dept: CARDIAC REHAB | Facility: HOSPITAL | Age: 43
End: 2023-09-20
Payer: COMMERCIAL

## 2023-09-21 ENCOUNTER — APPOINTMENT (OUTPATIENT)
Dept: CARDIAC REHAB | Facility: HOSPITAL | Age: 43
End: 2023-09-21
Payer: COMMERCIAL

## 2023-09-25 ENCOUNTER — APPOINTMENT (OUTPATIENT)
Dept: CARDIAC REHAB | Facility: HOSPITAL | Age: 43
End: 2023-09-25
Payer: COMMERCIAL

## 2023-09-26 DIAGNOSIS — F41.1 GAD (GENERALIZED ANXIETY DISORDER): ICD-10-CM

## 2023-09-26 DIAGNOSIS — F33.2 SEVERE RECURRENT MAJOR DEPRESSION WITHOUT PSYCHOTIC FEATURES (HCC): ICD-10-CM

## 2023-09-27 ENCOUNTER — APPOINTMENT (OUTPATIENT)
Dept: CARDIAC REHAB | Facility: HOSPITAL | Age: 43
End: 2023-09-27
Payer: COMMERCIAL

## 2023-09-27 ENCOUNTER — CARDPULM VISIT (OUTPATIENT)
Dept: CARDIAC REHAB | Facility: HOSPITAL | Age: 43
End: 2023-09-27
Attending: INTERNAL MEDICINE
Payer: COMMERCIAL

## 2023-09-27 PROCEDURE — 93798 PHYS/QHP OP CAR RHAB W/ECG: CPT

## 2023-09-27 RX ORDER — BUSPIRONE HYDROCHLORIDE 15 MG/1
15 TABLET ORAL 2 TIMES DAILY
Qty: 180 TABLET | Refills: 1 | Status: SHIPPED | OUTPATIENT
Start: 2023-09-27

## 2023-09-27 NOTE — TELEPHONE ENCOUNTER
.A refill request was received for:  Requested Prescriptions     Pending Prescriptions Disp Refills    BUSPIRONE 15 MG Oral Tab [Pharmacy Med Name: BUSPIRONE 15MG TABLETS] 180 tablet 1     Sig: TAKE 1 TABLET(15 MG) BY MOUTH TWICE DAILY       Last refill date:   3/18/2023    Last office visit: 5/15/2023    Follow up due:  No future appointments.

## 2023-09-28 ENCOUNTER — APPOINTMENT (OUTPATIENT)
Dept: CARDIAC REHAB | Facility: HOSPITAL | Age: 43
End: 2023-09-28
Payer: COMMERCIAL

## 2023-09-28 ENCOUNTER — APPOINTMENT (OUTPATIENT)
Dept: CARDIAC REHAB | Facility: HOSPITAL | Age: 43
End: 2023-09-28
Attending: INTERNAL MEDICINE
Payer: COMMERCIAL

## 2023-10-02 ENCOUNTER — CARDPULM VISIT (OUTPATIENT)
Dept: CARDIAC REHAB | Facility: HOSPITAL | Age: 43
End: 2023-10-02
Attending: INTERNAL MEDICINE
Payer: COMMERCIAL

## 2023-10-02 ENCOUNTER — APPOINTMENT (OUTPATIENT)
Dept: CARDIAC REHAB | Facility: HOSPITAL | Age: 43
End: 2023-10-02
Payer: COMMERCIAL

## 2023-10-02 PROCEDURE — 93798 PHYS/QHP OP CAR RHAB W/ECG: CPT

## 2023-10-04 ENCOUNTER — APPOINTMENT (OUTPATIENT)
Dept: CARDIAC REHAB | Facility: HOSPITAL | Age: 43
End: 2023-10-04
Payer: COMMERCIAL

## 2023-10-04 ENCOUNTER — CARDPULM VISIT (OUTPATIENT)
Dept: CARDIAC REHAB | Facility: HOSPITAL | Age: 43
End: 2023-10-04
Attending: INTERNAL MEDICINE
Payer: COMMERCIAL

## 2023-10-04 PROCEDURE — 93798 PHYS/QHP OP CAR RHAB W/ECG: CPT

## 2023-10-05 ENCOUNTER — CARDPULM VISIT (OUTPATIENT)
Dept: CARDIAC REHAB | Facility: HOSPITAL | Age: 43
End: 2023-10-05
Attending: INTERNAL MEDICINE
Payer: COMMERCIAL

## 2023-10-05 ENCOUNTER — APPOINTMENT (OUTPATIENT)
Dept: CARDIAC REHAB | Facility: HOSPITAL | Age: 43
End: 2023-10-05
Payer: COMMERCIAL

## 2023-10-05 PROCEDURE — 93798 PHYS/QHP OP CAR RHAB W/ECG: CPT

## 2023-10-08 ENCOUNTER — OFFICE VISIT (OUTPATIENT)
Dept: FAMILY MEDICINE CLINIC | Facility: CLINIC | Age: 43
End: 2023-10-08
Payer: COMMERCIAL

## 2023-10-08 VITALS
HEART RATE: 87 BPM | BODY MASS INDEX: 40.55 KG/M2 | RESPIRATION RATE: 20 BRPM | HEIGHT: 67 IN | DIASTOLIC BLOOD PRESSURE: 88 MMHG | SYSTOLIC BLOOD PRESSURE: 128 MMHG | OXYGEN SATURATION: 96 % | WEIGHT: 258.38 LBS | TEMPERATURE: 97 F

## 2023-10-08 DIAGNOSIS — J01.00 ACUTE NON-RECURRENT MAXILLARY SINUSITIS: Primary | ICD-10-CM

## 2023-10-08 PROCEDURE — 3079F DIAST BP 80-89 MM HG: CPT | Performed by: NURSE PRACTITIONER

## 2023-10-08 PROCEDURE — 3008F BODY MASS INDEX DOCD: CPT | Performed by: NURSE PRACTITIONER

## 2023-10-08 PROCEDURE — 3074F SYST BP LT 130 MM HG: CPT | Performed by: NURSE PRACTITIONER

## 2023-10-08 PROCEDURE — 99213 OFFICE O/P EST LOW 20 MIN: CPT | Performed by: NURSE PRACTITIONER

## 2023-10-08 RX ORDER — AMOXICILLIN 875 MG/1
875 TABLET, COATED ORAL 2 TIMES DAILY
Qty: 14 TABLET | Refills: 0 | Status: SHIPPED | OUTPATIENT
Start: 2023-10-08 | End: 2023-10-15

## 2023-10-09 ENCOUNTER — APPOINTMENT (OUTPATIENT)
Dept: CARDIAC REHAB | Facility: HOSPITAL | Age: 43
End: 2023-10-09
Attending: INTERNAL MEDICINE
Payer: COMMERCIAL

## 2023-10-11 ENCOUNTER — CARDPULM VISIT (OUTPATIENT)
Dept: CARDIAC REHAB | Facility: HOSPITAL | Age: 43
End: 2023-10-11
Attending: INTERNAL MEDICINE
Payer: COMMERCIAL

## 2023-10-11 PROCEDURE — 93798 PHYS/QHP OP CAR RHAB W/ECG: CPT

## 2023-10-12 ENCOUNTER — CARDPULM VISIT (OUTPATIENT)
Dept: CARDIAC REHAB | Facility: HOSPITAL | Age: 43
End: 2023-10-12
Attending: INTERNAL MEDICINE
Payer: COMMERCIAL

## 2023-10-12 PROCEDURE — 93798 PHYS/QHP OP CAR RHAB W/ECG: CPT

## 2023-10-13 ENCOUNTER — PATIENT MESSAGE (OUTPATIENT)
Dept: FAMILY MEDICINE CLINIC | Facility: CLINIC | Age: 43
End: 2023-10-13

## 2023-10-13 ENCOUNTER — OFFICE VISIT (OUTPATIENT)
Dept: FAMILY MEDICINE CLINIC | Facility: CLINIC | Age: 43
End: 2023-10-13
Payer: COMMERCIAL

## 2023-10-13 VITALS
OXYGEN SATURATION: 99 % | SYSTOLIC BLOOD PRESSURE: 124 MMHG | HEIGHT: 67 IN | HEART RATE: 76 BPM | WEIGHT: 258 LBS | DIASTOLIC BLOOD PRESSURE: 84 MMHG | RESPIRATION RATE: 16 BRPM | BODY MASS INDEX: 40.49 KG/M2

## 2023-10-13 DIAGNOSIS — R06.89 DECREASED BREATH SOUNDS: ICD-10-CM

## 2023-10-13 DIAGNOSIS — J98.8 RESPIRATORY INFECTION: ICD-10-CM

## 2023-10-13 DIAGNOSIS — Z00.00 ROUTINE GENERAL MEDICAL EXAMINATION AT A HEALTH CARE FACILITY: Primary | ICD-10-CM

## 2023-10-13 DIAGNOSIS — F33.2 SEVERE RECURRENT MAJOR DEPRESSION WITHOUT PSYCHOTIC FEATURES (HCC): ICD-10-CM

## 2023-10-13 DIAGNOSIS — Z12.31 VISIT FOR SCREENING MAMMOGRAM: ICD-10-CM

## 2023-10-13 DIAGNOSIS — I10 BENIGN ESSENTIAL HTN: ICD-10-CM

## 2023-10-13 DIAGNOSIS — Z23 NEED FOR VACCINATION: ICD-10-CM

## 2023-10-13 DIAGNOSIS — I25.10 CAD, MULTIPLE VESSEL: ICD-10-CM

## 2023-10-13 DIAGNOSIS — Z12.83 SKIN CANCER SCREENING: ICD-10-CM

## 2023-10-13 DIAGNOSIS — F41.1 GAD (GENERALIZED ANXIETY DISORDER): ICD-10-CM

## 2023-10-13 DIAGNOSIS — Z95.1 S/P CABG X 3: ICD-10-CM

## 2023-10-13 DIAGNOSIS — E78.2 MIXED HYPERLIPIDEMIA: ICD-10-CM

## 2023-10-13 PROCEDURE — 99396 PREV VISIT EST AGE 40-64: CPT | Performed by: PHYSICIAN ASSISTANT

## 2023-10-13 PROCEDURE — 90471 IMMUNIZATION ADMIN: CPT | Performed by: PHYSICIAN ASSISTANT

## 2023-10-13 PROCEDURE — 3008F BODY MASS INDEX DOCD: CPT | Performed by: PHYSICIAN ASSISTANT

## 2023-10-13 PROCEDURE — 90686 IIV4 VACC NO PRSV 0.5 ML IM: CPT | Performed by: PHYSICIAN ASSISTANT

## 2023-10-13 PROCEDURE — 3079F DIAST BP 80-89 MM HG: CPT | Performed by: PHYSICIAN ASSISTANT

## 2023-10-13 PROCEDURE — 3074F SYST BP LT 130 MM HG: CPT | Performed by: PHYSICIAN ASSISTANT

## 2023-10-13 RX ORDER — VENLAFAXINE HYDROCHLORIDE 150 MG/1
150 CAPSULE, EXTENDED RELEASE ORAL 2 TIMES DAILY
Qty: 180 CAPSULE | Refills: 1 | Status: SHIPPED | OUTPATIENT
Start: 2023-10-13

## 2023-10-13 RX ORDER — PREDNISONE 20 MG/1
40 TABLET ORAL DAILY
Qty: 10 TABLET | Refills: 0 | Status: SHIPPED | OUTPATIENT
Start: 2023-10-13 | End: 2023-10-18

## 2023-10-13 RX ORDER — DOXYCYCLINE HYCLATE 100 MG/1
100 CAPSULE ORAL 2 TIMES DAILY
Qty: 14 CAPSULE | Refills: 0 | Status: SHIPPED | OUTPATIENT
Start: 2023-10-13

## 2023-10-13 RX ORDER — ALBUTEROL SULFATE 90 UG/1
2 AEROSOL, METERED RESPIRATORY (INHALATION) EVERY 6 HOURS PRN
Qty: 1 EACH | Refills: 0 | Status: SHIPPED | OUTPATIENT
Start: 2023-10-13 | End: 2023-10-16

## 2023-10-13 RX ORDER — BUSPIRONE HYDROCHLORIDE 15 MG/1
15 TABLET ORAL 2 TIMES DAILY
Qty: 180 TABLET | Refills: 1 | Status: SHIPPED | OUTPATIENT
Start: 2023-10-13

## 2023-10-13 NOTE — PROGRESS NOTES
Subjective:   Patient ID: Apryl Veliz is a 38year old female. HPI  Patient presents today requesting physical exam.     Diet: could be better, getting a good variety of foods, no red meat  Exercise: no regular regimen  Tobacco use: denies  Drug use: denies  Alcohol use: denies  Sexually active: with spouse  LMP: WILFREDOH 2021  Marital status:  2 children  Occupation: teacher     Health maintenance:  Pap/Hpv: 3/30/18 negative TAH 2021  Mammogram: never  Performs SBEs: yes  Dexa scan: never  Colonoscopy: never  Discussed age appropriate vaccines     H/o anxiety and depression  Has seen psych/therapy  She has been stable on buspar and effexor for the last 2-3 years  Takes xanax only as needed for panic which helps  She is comfortable with our group managing her medications    H/o CAD s/p stenting of LAD and circumflex c/b subsequent MI  Then underwent CABG 5/5/23  Compliant with ASA, Plavix, metoprolol, crestor  Still participating in cardiac rehab  Had sternum hardware removed in August and doing much better    Has URI/sinus infection  Still having headache, cough, wheezing, chest congestion  Amoxicillin helping very little  Claritin not helping  Mucinex not helping    History/Other:   Review of Systems   Constitutional:  Positive for fatigue. Negative for chills, diaphoresis and fever. HENT:  Positive for congestion, postnasal drip, rhinorrhea, sinus pressure and sinus pain. Negative for ear pain, hearing loss and sore throat. Eyes:  Negative for visual disturbance. Respiratory:  Positive for cough, chest tightness and wheezing. Negative for shortness of breath. Cardiovascular:  Negative for chest pain, palpitations and leg swelling. Gastrointestinal:  Negative for abdominal pain, diarrhea, nausea and vomiting. Genitourinary:  Negative for dysuria, frequency and hematuria. Musculoskeletal:  Negative for arthralgias, gait problem and myalgias. Skin:  Negative for rash.    Neurological: Negative for dizziness, weakness, light-headedness and headaches. Hematological:  Negative for adenopathy. Psychiatric/Behavioral:  Negative for dysphoric mood and sleep disturbance. The patient is not nervous/anxious. Current Outpatient Medications   Medication Sig Dispense Refill    venlafaxine  MG Oral Capsule SR 24 Hr Take 1 capsule (150 mg total) by mouth 2 (two) times daily. 180 capsule 1    busPIRone 15 MG Oral Tab Take 1 tablet (15 mg total) by mouth 2 (two) times daily. 180 tablet 1    amoxicillin 875 MG Oral Tab Take 1 tablet (875 mg total) by mouth 2 (two) times daily for 7 days. 14 tablet 0    rosuvastatin 20 MG Oral Tab Take 1 tablet (20 mg total) by mouth nightly. levothyroxine 200 MCG Oral Tab Take 1 tablet (200 mcg total) by mouth before breakfast. 90 tablet 0    metoprolol tartrate 50 MG Oral Tab Take 1 tablet (50 mg total) by mouth 2x Daily(Beta Blocker). 60 tablet 11    ALPRAZolam 0.5 MG Oral Tab Take 0.5 tablets (0.25 mg total) by mouth 3 (three) times daily as needed for Anxiety. aspirin 81 MG Oral Tab EC Take 1 tablet (81 mg total) by mouth daily. 90 tablet 3    clopidogrel 75 MG Oral Tab Take 1 tablet (75 mg total) by mouth daily. 30 tablet 11     Allergies:  Levofloxacin            DIARRHEA    Objective:   Physical Exam  Vitals and nursing note reviewed. Constitutional:       General: She is not in acute distress. Appearance: Normal appearance. She is well-developed. HENT:      Head: Normocephalic and atraumatic. Right Ear: Ear canal and external ear normal. A middle ear effusion is present. Left Ear: Ear canal and external ear normal. A middle ear effusion is present. Nose: Mucosal edema and rhinorrhea present. Right Sinus: Maxillary sinus tenderness present. Left Sinus: Maxillary sinus tenderness present.       Mouth/Throat:      Mouth: Mucous membranes are moist.      Pharynx: Pharyngeal swelling and posterior oropharyngeal erythema present. Eyes:      Extraocular Movements: Extraocular movements intact. Conjunctiva/sclera: Conjunctivae normal.      Pupils: Pupils are equal, round, and reactive to light. Neck:      Thyroid: No thyromegaly. Cardiovascular:      Rate and Rhythm: Normal rate and regular rhythm. Pulses: Normal pulses. Heart sounds: Normal heart sounds. Pulmonary:      Effort: Pulmonary effort is normal.      Breath sounds: Decreased air movement present. Examination of the left-middle field reveals decreased breath sounds. Examination of the right-lower field reveals decreased breath sounds. Examination of the left-lower field reveals decreased breath sounds. Decreased breath sounds and wheezing present. No rales. Abdominal:      General: Bowel sounds are normal. There is no distension. Palpations: Abdomen is soft. There is no mass. Tenderness: There is no abdominal tenderness. Musculoskeletal:         General: No tenderness. Normal range of motion. Cervical back: Normal range of motion and neck supple. Lymphadenopathy:      Cervical: No cervical adenopathy. Skin:     General: Skin is warm and dry. Findings: No rash. Neurological:      General: No focal deficit present. Mental Status: She is alert and oriented to person, place, and time. Psychiatric:         Mood and Affect: Mood normal.         Behavior: Behavior normal.         Assessment & Plan:   1. Routine general medical examination at a health care facility  Patient is here with active issues as below. Check fasting labs. Health maintenance issues discussed. Encouraged routine vaccines. Advised healthy diet and regular exercise. Annual physicals. - TSH W Reflex To Free T4; Future  - Vitamin B12; Future  - Vitamin D; Future    2. Visit for screening mammogram  - [facility] ROSA 2D+3D SCREENING BILAT (CPT=77067/54197); Future    3. Skin cancer screening  - Derm Referral - In Network    4.  Severe recurrent major depression without psychotic features (HCC)  Stable overall. Cpm. Follow up in 6 months. - venlafaxine  MG Oral Capsule SR 24 Hr; Take 1 capsule (150 mg total) by mouth 2 (two) times daily. Dispense: 180 capsule; Refill: 1  - busPIRone 15 MG Oral Tab; Take 1 tablet (15 mg total) by mouth 2 (two) times daily. Dispense: 180 tablet; Refill: 1    5. MICHAEL (generalized anxiety disorder)  Stable. Cpm. Follow up in 6 months. - venlafaxine  MG Oral Capsule SR 24 Hr; Take 1 capsule (150 mg total) by mouth 2 (two) times daily. Dispense: 180 capsule; Refill: 1  - busPIRone 15 MG Oral Tab; Take 1 tablet (15 mg total) by mouth 2 (two) times daily. Dispense: 180 tablet; Refill: 1    6. S/P CABG x 3  7. CAD, multiple vessel  Currently asymptomatic. Continue to optimize risk factors and follow up with cardiology. 8. Mixed hyperlipidemia  Continue same dose of statin. 9. Benign essential HTN  Controlled. Cpm.     10. Decreased breath sounds  11. Respiratory infection  Empirically tx with abx as directed. Take with food and probiotic. Add prednisone and albuterol inhaler. Follow up in 2-3 days with condition update. Continue supportive are.   - doxycycline 100 MG Oral Cap; Take 1 capsule (100 mg total) by mouth 2 (two) times daily. Dispense: 14 capsule; Refill: 0  - predniSONE 20 MG Oral Tab; Take 2 tablets (40 mg total) by mouth daily for 5 days. Dispense: 10 tablet; Refill: 0  - albuterol 108 (90 Base) MCG/ACT Inhalation Aero Soln; Inhale 2 puffs into the lungs every 6 (six) hours as needed for Wheezing. Dispense: 1 each; Refill: 0    12.  Need for vaccination  - Fluzone Quadrivalent 6mo and older, 0.5mL

## 2023-10-16 ENCOUNTER — CARDPULM VISIT (OUTPATIENT)
Dept: CARDIAC REHAB | Facility: HOSPITAL | Age: 43
End: 2023-10-16
Attending: INTERNAL MEDICINE
Payer: COMMERCIAL

## 2023-10-16 PROCEDURE — 93798 PHYS/QHP OP CAR RHAB W/ECG: CPT

## 2023-10-16 RX ORDER — ALBUTEROL SULFATE 90 UG/1
2 AEROSOL, METERED RESPIRATORY (INHALATION) EVERY 6 HOURS PRN
Qty: 1 EACH | Refills: 0 | Status: SHIPPED | OUTPATIENT
Start: 2023-10-16

## 2023-10-18 ENCOUNTER — CARDPULM VISIT (OUTPATIENT)
Dept: CARDIAC REHAB | Facility: HOSPITAL | Age: 43
End: 2023-10-18
Attending: INTERNAL MEDICINE
Payer: COMMERCIAL

## 2023-10-18 PROCEDURE — 93798 PHYS/QHP OP CAR RHAB W/ECG: CPT

## 2023-10-19 ENCOUNTER — CARDPULM VISIT (OUTPATIENT)
Dept: CARDIAC REHAB | Facility: HOSPITAL | Age: 43
End: 2023-10-19
Attending: INTERNAL MEDICINE
Payer: COMMERCIAL

## 2023-10-19 PROCEDURE — 93798 PHYS/QHP OP CAR RHAB W/ECG: CPT

## 2023-10-23 ENCOUNTER — CARDPULM VISIT (OUTPATIENT)
Dept: CARDIAC REHAB | Facility: HOSPITAL | Age: 43
End: 2023-10-23
Attending: INTERNAL MEDICINE
Payer: COMMERCIAL

## 2023-10-23 PROCEDURE — 93798 PHYS/QHP OP CAR RHAB W/ECG: CPT

## 2023-10-25 ENCOUNTER — CARDPULM VISIT (OUTPATIENT)
Dept: CARDIAC REHAB | Facility: HOSPITAL | Age: 43
End: 2023-10-25
Attending: INTERNAL MEDICINE
Payer: COMMERCIAL

## 2023-10-25 PROCEDURE — 93798 PHYS/QHP OP CAR RHAB W/ECG: CPT

## 2023-10-26 ENCOUNTER — CARDPULM VISIT (OUTPATIENT)
Dept: CARDIAC REHAB | Facility: HOSPITAL | Age: 43
End: 2023-10-26
Attending: INTERNAL MEDICINE
Payer: COMMERCIAL

## 2023-10-26 PROCEDURE — 93798 PHYS/QHP OP CAR RHAB W/ECG: CPT

## 2023-11-03 ENCOUNTER — PATIENT MESSAGE (OUTPATIENT)
Dept: FAMILY MEDICINE CLINIC | Facility: CLINIC | Age: 43
End: 2023-11-03

## 2023-11-03 DIAGNOSIS — Z12.83 SKIN CANCER SCREENING: Primary | ICD-10-CM

## 2023-11-21 ENCOUNTER — HOSPITAL ENCOUNTER (OUTPATIENT)
Dept: MAMMOGRAPHY | Age: 43
Discharge: HOME | End: 2023-11-21
Attending: PHYSICIAN ASSISTANT
Payer: COMMERCIAL

## 2023-11-21 DIAGNOSIS — Z12.31 VISIT FOR SCREENING MAMMOGRAM: ICD-10-CM

## 2023-11-21 PROCEDURE — 77063 BREAST TOMOSYNTHESIS BI: CPT | Performed by: PHYSICIAN ASSISTANT

## 2023-11-21 PROCEDURE — 77067 SCR MAMMO BI INCL CAD: CPT | Performed by: PHYSICIAN ASSISTANT

## 2023-11-29 DIAGNOSIS — E06.3 HYPOTHYROIDISM DUE TO HASHIMOTO'S THYROIDITIS: ICD-10-CM

## 2023-11-29 DIAGNOSIS — E03.8 HYPOTHYROIDISM DUE TO HASHIMOTO'S THYROIDITIS: ICD-10-CM

## 2023-11-29 RX ORDER — LEVOTHYROXINE SODIUM 0.2 MG/1
200 TABLET ORAL
Qty: 90 TABLET | Refills: 0 | Status: SHIPPED | OUTPATIENT
Start: 2023-11-29

## 2024-01-23 ENCOUNTER — PATIENT MESSAGE (OUTPATIENT)
Dept: FAMILY MEDICINE CLINIC | Facility: CLINIC | Age: 44
End: 2024-01-23

## 2024-01-23 DIAGNOSIS — L91.0 KELOID SCAR: Primary | ICD-10-CM

## 2024-01-24 NOTE — TELEPHONE ENCOUNTER
From: Estefany Retana  To: Irma Goyal  Sent: 1/23/2024 4:19 PM CST  Subject: Additional referral visits    Good afternoon. I am currently seeing Dr. Viral Robbins for scar treatment. He is injecting steriods into my chest scar since it has developed keloids. Today is my last referral visit. Can I have 3 more? I’m seeing him about every month.

## 2024-01-29 ENCOUNTER — LAB ENCOUNTER (OUTPATIENT)
Dept: LAB | Age: 44
End: 2024-01-29
Attending: PHYSICIAN ASSISTANT
Payer: COMMERCIAL

## 2024-01-29 DIAGNOSIS — Z00.00 ROUTINE GENERAL MEDICAL EXAMINATION AT A HEALTH CARE FACILITY: ICD-10-CM

## 2024-01-29 LAB
TSI SER-ACNC: 2.12 MIU/ML (ref 0.36–3.74)
VIT B12 SERPL-MCNC: 442 PG/ML (ref 193–986)
VIT D+METAB SERPL-MCNC: 20.7 NG/ML (ref 30–100)

## 2024-01-29 PROCEDURE — 36415 COLL VENOUS BLD VENIPUNCTURE: CPT

## 2024-01-29 PROCEDURE — 82607 VITAMIN B-12: CPT

## 2024-01-29 PROCEDURE — 82306 VITAMIN D 25 HYDROXY: CPT

## 2024-01-29 PROCEDURE — 84443 ASSAY THYROID STIM HORMONE: CPT

## 2024-03-03 DIAGNOSIS — E03.8 HYPOTHYROIDISM DUE TO HASHIMOTO'S THYROIDITIS: ICD-10-CM

## 2024-03-03 DIAGNOSIS — E06.3 HYPOTHYROIDISM DUE TO HASHIMOTO'S THYROIDITIS: ICD-10-CM

## 2024-03-04 RX ORDER — LEVOTHYROXINE SODIUM 0.2 MG/1
200 TABLET ORAL
Qty: 90 TABLET | Refills: 0 | Status: SHIPPED | OUTPATIENT
Start: 2024-03-04

## 2024-05-11 ENCOUNTER — LAB ENCOUNTER (OUTPATIENT)
Dept: LAB | Age: 44
End: 2024-05-11
Attending: NURSE PRACTITIONER

## 2024-05-11 ENCOUNTER — OFFICE VISIT (OUTPATIENT)
Dept: FAMILY MEDICINE CLINIC | Facility: CLINIC | Age: 44
End: 2024-05-11
Payer: COMMERCIAL

## 2024-05-11 VITALS
WEIGHT: 277 LBS | RESPIRATION RATE: 16 BRPM | HEART RATE: 102 BPM | OXYGEN SATURATION: 96 % | SYSTOLIC BLOOD PRESSURE: 118 MMHG | TEMPERATURE: 97 F | BODY MASS INDEX: 43.47 KG/M2 | DIASTOLIC BLOOD PRESSURE: 88 MMHG | HEIGHT: 67 IN

## 2024-05-11 DIAGNOSIS — E78.2 MIXED HYPERLIPIDEMIA: ICD-10-CM

## 2024-05-11 DIAGNOSIS — I25.10 CORONARY ATHEROSCLEROSIS OF NATIVE CORONARY ARTERY: Primary | ICD-10-CM

## 2024-05-11 DIAGNOSIS — J06.9 VIRAL URI: Primary | ICD-10-CM

## 2024-05-11 DIAGNOSIS — R73.9 BLOOD GLUCOSE ELEVATED: ICD-10-CM

## 2024-05-11 DIAGNOSIS — J34.89 SINUS PRESSURE: ICD-10-CM

## 2024-05-11 DIAGNOSIS — E03.9 MYXEDEMA HEART DISEASE: ICD-10-CM

## 2024-05-11 DIAGNOSIS — I51.9 MYXEDEMA HEART DISEASE: ICD-10-CM

## 2024-05-11 LAB
ALBUMIN SERPL-MCNC: 3.7 G/DL (ref 3.4–5)
ALBUMIN/GLOB SERPL: 1.1 {RATIO} (ref 1–2)
ALP LIVER SERPL-CCNC: 89 U/L
ALT SERPL-CCNC: 40 U/L
ANION GAP SERPL CALC-SCNC: 5 MMOL/L (ref 0–18)
AST SERPL-CCNC: 20 U/L (ref 15–37)
BASOPHILS # BLD AUTO: 0.01 X10(3) UL (ref 0–0.2)
BASOPHILS NFR BLD AUTO: 0.2 %
BILIRUB SERPL-MCNC: 0.4 MG/DL (ref 0.1–2)
BUN BLD-MCNC: 14 MG/DL (ref 9–23)
CALCIUM BLD-MCNC: 8.9 MG/DL (ref 8.5–10.1)
CHLORIDE SERPL-SCNC: 109 MMOL/L (ref 98–112)
CHOLEST SERPL-MCNC: 158 MG/DL (ref ?–200)
CO2 SERPL-SCNC: 28 MMOL/L (ref 21–32)
CREAT BLD-MCNC: 1.08 MG/DL
EGFRCR SERPLBLD CKD-EPI 2021: 66 ML/MIN/1.73M2 (ref 60–?)
EOSINOPHIL # BLD AUTO: 0.02 X10(3) UL (ref 0–0.7)
EOSINOPHIL NFR BLD AUTO: 0.4 %
ERYTHROCYTE [DISTWIDTH] IN BLOOD BY AUTOMATED COUNT: 13.4 %
FASTING PATIENT LIPID ANSWER: YES
FASTING STATUS PATIENT QL REPORTED: YES
GLOBULIN PLAS-MCNC: 3.3 G/DL (ref 2.8–4.4)
GLUCOSE BLD-MCNC: 113 MG/DL (ref 70–99)
HCT VFR BLD AUTO: 43.7 %
HDLC SERPL-MCNC: 48 MG/DL (ref 40–59)
HGB BLD-MCNC: 14.2 G/DL
IMM GRANULOCYTES # BLD AUTO: 0.01 X10(3) UL (ref 0–1)
IMM GRANULOCYTES NFR BLD: 0.2 %
LDLC SERPL CALC-MCNC: 75 MG/DL (ref ?–100)
LYMPHOCYTES # BLD AUTO: 1.22 X10(3) UL (ref 1–4)
LYMPHOCYTES NFR BLD AUTO: 21.9 %
MCH RBC QN AUTO: 29.8 PG (ref 26–34)
MCHC RBC AUTO-ENTMCNC: 32.5 G/DL (ref 31–37)
MCV RBC AUTO: 91.6 FL
MONOCYTES # BLD AUTO: 0.75 X10(3) UL (ref 0.1–1)
MONOCYTES NFR BLD AUTO: 13.5 %
NEUTROPHILS # BLD AUTO: 3.56 X10 (3) UL (ref 1.5–7.7)
NEUTROPHILS # BLD AUTO: 3.56 X10(3) UL (ref 1.5–7.7)
NEUTROPHILS NFR BLD AUTO: 63.8 %
NONHDLC SERPL-MCNC: 110 MG/DL (ref ?–130)
OSMOLALITY SERPL CALC.SUM OF ELEC: 295 MOSM/KG (ref 275–295)
PLATELET # BLD AUTO: 191 10(3)UL (ref 150–450)
POTASSIUM SERPL-SCNC: 4.3 MMOL/L (ref 3.5–5.1)
PROT SERPL-MCNC: 7 G/DL (ref 6.4–8.2)
RBC # BLD AUTO: 4.77 X10(6)UL
SODIUM SERPL-SCNC: 142 MMOL/L (ref 136–145)
TRIGL SERPL-MCNC: 208 MG/DL (ref 30–149)
VLDLC SERPL CALC-MCNC: 32 MG/DL (ref 0–30)
WBC # BLD AUTO: 5.6 X10(3) UL (ref 4–11)

## 2024-05-11 PROCEDURE — 3008F BODY MASS INDEX DOCD: CPT | Performed by: NURSE PRACTITIONER

## 2024-05-11 PROCEDURE — 85025 COMPLETE CBC W/AUTO DIFF WBC: CPT

## 2024-05-11 PROCEDURE — 80061 LIPID PANEL: CPT

## 2024-05-11 PROCEDURE — 99213 OFFICE O/P EST LOW 20 MIN: CPT | Performed by: NURSE PRACTITIONER

## 2024-05-11 PROCEDURE — 3074F SYST BP LT 130 MM HG: CPT | Performed by: NURSE PRACTITIONER

## 2024-05-11 PROCEDURE — 36415 COLL VENOUS BLD VENIPUNCTURE: CPT

## 2024-05-11 PROCEDURE — 3079F DIAST BP 80-89 MM HG: CPT | Performed by: NURSE PRACTITIONER

## 2024-05-11 PROCEDURE — 80053 COMPREHEN METABOLIC PANEL: CPT

## 2024-05-11 NOTE — PROGRESS NOTES
CHIEF COMPLAINT:     Chief Complaint   Patient presents with    Sinus Problem     PND, sinus pressure, HA, phlegm, bilat ear pressure   Sx onset Wednesday   OTC Benadryl        HPI:   Estefany Retana is a 43 year old female who presents for upper respiratory symptoms for  4 days. Patient reports congestion, sinus pain, ear pressure . Symptoms have been been consistent since onset.  Treating symptoms with tylenol.   Reports chills/aches first day or two, some dizziness on day 2 but resolved now.  Reports mostly sinus pressure and feeling secretions are \"stuck\".  Denies cough, fever, wheezing/sob.   with reported sinus infection at home as well.     Current Outpatient Medications   Medication Sig Dispense Refill    levothyroxine 200 MCG Oral Tab Take 1 tablet (200 mcg total) by mouth before breakfast. 90 tablet 0    levothyroxine 200 MCG Oral Tab Take 1 tablet (200 mcg total) by mouth before breakfast. 90 tablet 0    albuterol 108 (90 Base) MCG/ACT Inhalation Aero Soln Inhale 2 puffs into the lungs every 6 (six) hours as needed for Wheezing. 1 each 0    venlafaxine  MG Oral Capsule SR 24 Hr Take 1 capsule (150 mg total) by mouth 2 (two) times daily. 180 capsule 1    busPIRone 15 MG Oral Tab Take 1 tablet (15 mg total) by mouth 2 (two) times daily. 180 tablet 1    doxycycline 100 MG Oral Cap Take 1 capsule (100 mg total) by mouth 2 (two) times daily. 14 capsule 0    rosuvastatin 20 MG Oral Tab Take 1 tablet (20 mg total) by mouth nightly.      metoprolol tartrate 50 MG Oral Tab Take 1 tablet (50 mg total) by mouth 2x Daily(Beta Blocker). 60 tablet 11    ALPRAZolam 0.5 MG Oral Tab Take 0.5 tablets (0.25 mg total) by mouth 3 (three) times daily as needed for Anxiety.      aspirin 81 MG Oral Tab EC Take 1 tablet (81 mg total) by mouth daily. 90 tablet 3    clopidogrel 75 MG Oral Tab Take 1 tablet (75 mg total) by mouth daily. 30 tablet 11      Past Medical History:    Allergic rhinitis    Anxiety state     Coronary atherosclerosis    Depression    Disorder of thyroid    High blood pressure    Hx of migraines    Hypothyroidism    Migraines    Pre-eclampsia (HCC)    Unspecified essential hypertension    Unspecified hypothyroidism      Past Surgical History:   Procedure Laterality Date    Anesth,vaginal procedures  2021    Suturing of Vaginal Cuff          Cabg      Hysterectomy  2021    Dr. otero total laparoscopic hysterectomy, bilateral salpingectomy, postop cuff bleeding    Other surgical history  2000    Ovarian cyst         Social History     Socioeconomic History    Marital status:    Tobacco Use    Smoking status: Never    Smokeless tobacco: Never   Vaping Use    Vaping status: Never Used   Substance and Sexual Activity    Alcohol use: Yes     Alcohol/week: 0.0 standard drinks of alcohol     Comment: very rare    Drug use: No    Sexual activity: Yes     Partners: Male     Birth control/protection: Hysterectomy   Other Topics Concern    Caffeine Concern Yes     Comment: 2 - 1 soda 1 coffee    Stress Concern Yes    Weight Concern Yes    Special Diet Yes     Comment: gluten free    Exercise No    Seat Belt Yes     Social Determinants of Health     Financial Resource Strain: Low Risk  (2023)    Financial Resource Strain     Difficulty of Paying Living Expenses: Not very hard     Med Affordability: No   Transportation Needs: No Transportation Needs (2023)    Transportation Needs     Lack of Transportation: No         REVIEW OF SYSTEMS:   GENERAL: feels well otherwise,   ok appetite  SKIN: no rashes or abnormal skin lesions  HEENT: See HPI  LUNGS: denies shortness of breath or wheezing, See HPI  CARDIOVASCULAR: denies chest pain or palpitations   GI: denies N/V/C or abdominal pain  NEURO: Denies headaches    EXAM:   /88   Pulse 102   Temp 97.2 °F (36.2 °C)   Resp 16   Ht 5' 7\" (1.702 m)   Wt 277 lb (125.6 kg)   LMP 2021 (Approximate)   SpO2 96%   BMI 43.38  kg/m²   GENERAL: well developed, well nourished,in no apparent distress  SKIN: no rashes,no suspicious lesions  HEAD: atraumatic, normocephalic.   tenderness on palpation of maxillary.  EYES: conjunctiva clear, EOM intact  EARS: TM's pearly, no bulging, no retraction,no fluid, bony landmarks visualized  NOSE: Nostrils patent, no visible nasal discharge, nasal mucosa pink and moist  THROAT: Oral mucosa pink, moist. Posterior pharynx is not erythematous. no exudates.   NECK: Supple, non-tender  LUNGS: clear to auscultation bilaterally, no wheezes or rhonchi.  No crackles/rales, good air movement throughout. Breathing is non labored.  CARDIO: RRR without murmur  EXTREMITIES: no cyanosis, clubbing or edema  LYMPH:  No cervical lymphadenopathy.        ASSESSMENT AND PLAN:   Estefany Retana is a 43 year old female who presents with     ASSESSMENT:   Encounter Diagnoses   Name Primary?    Viral URI Yes    Sinus pressure      PLAN:Discussed viral vs bacterial etiology of URIs, including pharyngitis, laryngitis, bronchitis and sinus congestion/pain. Patient was informed that antibiotics are not effective for treating viral ailments and can result in antibiotic resistence. Reviewed symptom relief measures with patient. Patient is  amenable to symptom relief measures. Mucinex to help thin secretions, Flonase daily as well.    Follow up with PCP if no improvement in 3-5 days, sooner if worsening.  If any sob/wheezing seek emergent care.Comfort care as described in Patient Instructions    Meds & Refills for this Visit:  Requested Prescriptions      No prescriptions requested or ordered in this encounter       Risks, benefits, and side effects of medication explained and discussed.    There are no Patient Instructions on file for this visit.    The patient indicates understanding of these issues and agrees to the plan.  The patient is asked to return if sx's persist or worsen.

## 2024-05-14 ENCOUNTER — LAB ENCOUNTER (OUTPATIENT)
Dept: LAB | Age: 44
End: 2024-05-14
Attending: INTERNAL MEDICINE

## 2024-05-14 DIAGNOSIS — R73.9 BLOOD GLUCOSE ELEVATED: Primary | ICD-10-CM

## 2024-05-14 LAB
EST. AVERAGE GLUCOSE BLD GHB EST-MCNC: 105 MG/DL (ref 68–126)
FASTING PATIENT GLUCOSE ANSWER: YES
GLUCOSE BLD-MCNC: 109 MG/DL (ref 70–99)
HBA1C MFR BLD: 5.3 % (ref ?–5.7)

## 2024-05-14 PROCEDURE — 36415 COLL VENOUS BLD VENIPUNCTURE: CPT

## 2024-05-14 PROCEDURE — 82947 ASSAY GLUCOSE BLOOD QUANT: CPT

## 2024-05-14 PROCEDURE — 83036 HEMOGLOBIN GLYCOSYLATED A1C: CPT

## 2024-05-16 ENCOUNTER — OFFICE VISIT (OUTPATIENT)
Dept: FAMILY MEDICINE CLINIC | Facility: CLINIC | Age: 44
End: 2024-05-16

## 2024-05-16 VITALS
HEIGHT: 67 IN | DIASTOLIC BLOOD PRESSURE: 78 MMHG | SYSTOLIC BLOOD PRESSURE: 126 MMHG | WEIGHT: 276 LBS | BODY MASS INDEX: 43.32 KG/M2 | OXYGEN SATURATION: 98 % | HEART RATE: 102 BPM

## 2024-05-16 DIAGNOSIS — E88.810 ABDOMINAL OBESITY AND METABOLIC SYNDROME: ICD-10-CM

## 2024-05-16 DIAGNOSIS — E66.9 ABDOMINAL OBESITY AND METABOLIC SYNDROME: ICD-10-CM

## 2024-05-16 DIAGNOSIS — R73.01 IMPAIRED FASTING GLUCOSE: Primary | ICD-10-CM

## 2024-05-16 DIAGNOSIS — Z95.1 S/P CABG X 3: ICD-10-CM

## 2024-05-16 DIAGNOSIS — E66.01 CLASS 3 SEVERE OBESITY WITH SERIOUS COMORBIDITY AND BODY MASS INDEX (BMI) OF 40.0 TO 44.9 IN ADULT, UNSPECIFIED OBESITY TYPE (HCC): ICD-10-CM

## 2024-05-16 PROBLEM — R94.31 ST SEGMENT DEPRESSION: Status: RESOLVED | Noted: 2023-04-29 | Resolved: 2024-05-16

## 2024-05-16 PROBLEM — E66.812 CLASS 2 OBESITY WITHOUT SERIOUS COMORBIDITY WITH BODY MASS INDEX (BMI) OF 38.0 TO 38.9 IN ADULT: Status: RESOLVED | Noted: 2019-07-31 | Resolved: 2024-05-16

## 2024-05-16 PROBLEM — R07.9 CHEST PAIN OF UNCERTAIN ETIOLOGY: Status: RESOLVED | Noted: 2023-09-09 | Resolved: 2024-05-16

## 2024-05-16 PROBLEM — I10 ESSENTIAL HYPERTENSION: Status: ACTIVE | Noted: 2023-01-01

## 2024-05-16 PROCEDURE — 3074F SYST BP LT 130 MM HG: CPT | Performed by: PHYSICIAN ASSISTANT

## 2024-05-16 PROCEDURE — 3008F BODY MASS INDEX DOCD: CPT | Performed by: PHYSICIAN ASSISTANT

## 2024-05-16 PROCEDURE — 3078F DIAST BP <80 MM HG: CPT | Performed by: PHYSICIAN ASSISTANT

## 2024-05-16 PROCEDURE — 99214 OFFICE O/P EST MOD 30 MIN: CPT | Performed by: PHYSICIAN ASSISTANT

## 2024-05-16 RX ORDER — METOPROLOL SUCCINATE 25 MG/1
TABLET, EXTENDED RELEASE ORAL
COMMUNITY
Start: 2024-05-14

## 2024-05-16 RX ORDER — PEN NEEDLE, DIABETIC 32 GX 1/4"
NEEDLE, DISPOSABLE MISCELLANEOUS
Qty: 30 EACH | Refills: 0 | Status: SHIPPED | OUTPATIENT
Start: 2024-05-16 | End: 2024-05-20 | Stop reason: CLARIF

## 2024-05-16 RX ORDER — DOXYCYCLINE HYCLATE 100 MG/1
100 CAPSULE ORAL 2 TIMES DAILY
Qty: 20 CAPSULE | Refills: 0 | Status: SHIPPED | OUTPATIENT
Start: 2024-05-16

## 2024-05-16 RX ORDER — ROSUVASTATIN CALCIUM 40 MG/1
40 TABLET, COATED ORAL DAILY
COMMUNITY
Start: 2024-05-14

## 2024-05-16 RX ORDER — LOSARTAN POTASSIUM 25 MG/1
TABLET ORAL
COMMUNITY
Start: 2023-11-20

## 2024-05-16 NOTE — PROGRESS NOTES
Subjective:   Patient ID: Estefany Retana is a 43 year old female.    HPI  Patient presents to discuss weight management.   She has PMH of impaired fasting glucose, obesity, CAD s/p CABG x 3, HTN, hyperlipidemia, hypothyroidism and other comorbidities  Recent labs show fasting glucose of 113  Weight has increased up to 276 lb with BMI of 43.2  She has been following regularly with cardiology and had some adjustments to her medications recently, was advised to follow up here for weight loss medication  In the past has been seen in the weight loss clinic and treated with phentermine, topiramate, and others without success  Now she had contraindications to some oral weight loss medications due to her cardiac history  It is even more important to manage her weight given her cardiac history and other comorbidities which makes her an great candidate for GLP-1  She has no personal or family h/o thyroid cancer, no h/o pancreatitis/gallbladder disease    Diet: difficult to manage since her CABG, trying to balance weight loss and heart healthy diet  Exercise: walking, trying to stay active  Tobacco use: denies  Drug use: denies  Alcohol use: denies  Sleep could be better  Some stress with end of year school activities      History/Other:   Review of Systems   Constitutional:  Negative for chills, diaphoresis and fever.   Eyes:  Negative for visual disturbance.   Respiratory:  Negative for chest tightness and shortness of breath.    Cardiovascular:  Negative for chest pain, palpitations and leg swelling.   Neurological:  Negative for dizziness, light-headedness and headaches.     Current Outpatient Medications   Medication Sig Dispense Refill    metoprolol succinate ER 25 MG Oral Tablet 24 Hr       rosuvastatin 40 MG Oral Tab Take 1 tablet (40 mg total) by mouth daily.      losartan 25 MG Oral Tab losartan 25 mg tablet, [RxNorm: 868396]      semaglutide-weight management 0.5 MG/0.5ML Subcutaneous Solution Auto-injector  Inject 0.5 mL (0.5 mg total) into the skin once a week for 4 doses. 2 mL 0    doxycycline 100 MG Oral Cap Take 1 capsule (100 mg total) by mouth 2 (two) times daily. 20 capsule 0    Insulin Pen Needle (NOVOFINE PEN NEEDLE) 32G X 6 MM Does not apply Misc Use as directed. 30 each 0    levothyroxine 200 MCG Oral Tab Take 1 tablet (200 mcg total) by mouth before breakfast. 90 tablet 0    venlafaxine  MG Oral Capsule SR 24 Hr Take 1 capsule (150 mg total) by mouth 2 (two) times daily. 180 capsule 1    busPIRone 15 MG Oral Tab Take 1 tablet (15 mg total) by mouth 2 (two) times daily. 180 tablet 1    ALPRAZolam 0.5 MG Oral Tab Take 0.5 tablets (0.25 mg total) by mouth 3 (three) times daily as needed for Anxiety.      aspirin 81 MG Oral Tab EC Take 1 tablet (81 mg total) by mouth daily. 90 tablet 3    clopidogrel 75 MG Oral Tab Take 1 tablet (75 mg total) by mouth daily. 30 tablet 11     Allergies:  Allergies   Allergen Reactions    Levofloxacin DIARRHEA       Objective:   Physical Exam  Vitals and nursing note reviewed.   Constitutional:       Appearance: She is well-developed.   HENT:      Head: Normocephalic and atraumatic.   Eyes:      Conjunctiva/sclera: Conjunctivae normal.      Pupils: Pupils are equal, round, and reactive to light.   Cardiovascular:      Rate and Rhythm: Normal rate and regular rhythm.      Heart sounds: Normal heart sounds.   Pulmonary:      Effort: Pulmonary effort is normal.      Breath sounds: Normal breath sounds. No wheezing or rales.   Musculoskeletal:      Cervical back: Normal range of motion and neck supple.   Neurological:      Mental Status: She is alert.         Assessment & Plan:   1. Impaired fasting glucose  2. Class 3 severe obesity with serious comorbidity and body mass index (BMI) of 40.0 to 44.9 in adult, unspecified obesity type (HCC)  3. Abdominal obesity and metabolic syndrome  4. S/P CABG x 3  Patient options discussed in detail, opted to start a trial of GLP-1 which is  likely going to be one of the safest and more effective medications given her medical history. Medication use and side effects discussed. Given sample pen of semaglutide and helped her self-administer first dose of 0.25 mg today in office. Recommend eating a small higher protein meal every 2-3 hours, get adequate water intake and increase exercise. Follow up in 4 weeks. Sooner prn.  - semaglutide-weight management 0.5 MG/0.5ML Subcutaneous Solution Auto-injector; Inject 0.5 mL (0.5 mg total) into the skin once a week for 4 doses.  Dispense: 2 mL; Refill: 0  - Insulin Pen Needle (NOVOFINE PEN NEEDLE) 32G X 6 MM Does not apply Misc; Use as directed.  Dispense: 30 each; Refill: 0

## 2024-05-20 ENCOUNTER — TELEPHONE (OUTPATIENT)
Dept: FAMILY MEDICINE CLINIC | Facility: CLINIC | Age: 44
End: 2024-05-20

## 2024-05-20 NOTE — TELEPHONE ENCOUNTER
Pharmacy called regarding Insulin Pen Needle - no directions - also, pharmacy states there is no other insulin on file for need for the pen needles.  Only showing wegovy - no need for needles.  Should pharmacy cancel script?

## 2024-05-31 ENCOUNTER — ORDER TRANSCRIPTION (OUTPATIENT)
Dept: SLEEP CENTER | Age: 44
End: 2024-05-31

## 2024-06-07 ENCOUNTER — OFFICE VISIT (OUTPATIENT)
Dept: NUTRITION | Facility: HOSPITAL | Age: 44
End: 2024-06-07
Attending: PHYSICIAN ASSISTANT
Payer: COMMERCIAL

## 2024-06-07 PROBLEM — E66.9 ABDOMINAL OBESITY AND METABOLIC SYNDROME: Status: ACTIVE | Noted: 2024-06-07

## 2024-06-07 PROBLEM — E66.813 CLASS 3 SEVERE OBESITY WITH SERIOUS COMORBIDITY AND BODY MASS INDEX (BMI) OF 40.0 TO 44.9 IN ADULT: Status: ACTIVE | Noted: 2023-05-15

## 2024-06-07 PROBLEM — E66.01 CLASS 3 SEVERE OBESITY WITH SERIOUS COMORBIDITY AND BODY MASS INDEX (BMI) OF 40.0 TO 44.9 IN ADULT (HCC): Status: ACTIVE | Noted: 2023-05-15

## 2024-06-07 PROBLEM — E88.810 ABDOMINAL OBESITY AND METABOLIC SYNDROME: Status: ACTIVE | Noted: 2024-06-07

## 2024-06-07 PROBLEM — R73.01 IMPAIRED FASTING GLUCOSE: Status: ACTIVE | Noted: 2024-06-07

## 2024-06-07 PROBLEM — E66.813 CLASS 3 SEVERE OBESITY WITH SERIOUS COMORBIDITY AND BODY MASS INDEX (BMI) OF 40.0 TO 44.9 IN ADULT (HCC): Status: ACTIVE | Noted: 2023-05-15

## 2024-06-07 PROCEDURE — 97802 MEDICAL NUTRITION INDIV IN: CPT

## 2024-06-07 NOTE — PROGRESS NOTES
ADULT INITIAL OUTPATIENT NUTRITION CONSULTATION    Nutrition Assessment    Medical Diagnosis: Obesity, hyperlipidemia, s/p CABG    PMH: julienne    Client Hx: 43 year old female    Meds: Wegovy x 3 weeks    Labs (24): chol: 158, hdl: 48, ldl: 75, t, glu: 109, A1c: 5.3%    ANTHROPOMETRICS:  Ht: 5'7\"  Wt: 276#    BMI: 43.22    Current Diet: Regular    Diet hx: WLC in past    Food/Beverage Intake:   BREAKFAST: premier protein drink and decaf coffee or skips (toast or bagel in past)  LUNCH: salad w chicken or low sodium turkey , yogurt parfait or skips  DINNER: grilled chicken or turkey burger, veggies  SNACKS: popcorn, sweets, fruit, string cheese  BEVERAGES: flavored water, unsweetened tea, no soda or juice    DISLIKES: seafood, avocado, red meat and cottage cheese    Meal pattern: 2-3 meals/d, 1-2 snacks/d    Number of meals/week eaten at restaurants: weekly (travel baseball season)    Alcohol Intake: very rarely    Diet Quality: Improved    Estimated nutrition needs: 1400 cals/d    Physical Activity: limited  GOAL: 150+ min/wk, adding weights 2x/wk    Sleep: good    Stress/anxiety: high, seeing therapist 2x/month    Nutrition Diagnosis: Food and Nutrition related knowledge deficit related to lack of previous diet education by RD as evidence by pt need for education regarding weight loss management and heart healthy guidelines.     Nutrition Intervention/Education: Comprehensive nutrition education and evaluation provided for weight loss management and heart healthy guidelines. 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 Pt reports s/p CABG and cardiac rehab program 7 months ago. Discussed heart healthy eating guidelines, avoiding sat fats and adding more omega 3 rich foods. Pt aware of sodium restriction and does look at labels. Encouraged low fat dairy, whole grains, more nuts, seeds, and f&v. Discouraged added sugars and trans fats. Written materials were  issued and explained. Pt referred to [facility] session and [facility] nutrition classes. All questions answered today. Pt has set goals to follow recommendations set today, planning activity and snacks/meals, to track intake using phone pallavi, MFP and to contact RD with further questions or concerns.           Monitoring/Evaluation:  Pt to follow with MD for labs, wt, and meds. RD available prn, suggest follow at [facility] and/or [facility] sessions-info provided.     Time spent with patient: 60 minutes    Thank you for the referral,    Mere Vega RD, LDN  [email or web address]  P: [phone]

## 2024-06-14 ENCOUNTER — OFFICE VISIT (OUTPATIENT)
Dept: FAMILY MEDICINE CLINIC | Facility: CLINIC | Age: 44
End: 2024-06-14
Payer: COMMERCIAL

## 2024-06-14 ENCOUNTER — TELEPHONE (OUTPATIENT)
Dept: FAMILY MEDICINE CLINIC | Facility: CLINIC | Age: 44
End: 2024-06-14

## 2024-06-14 VITALS
HEIGHT: 67 IN | DIASTOLIC BLOOD PRESSURE: 88 MMHG | WEIGHT: 273 LBS | HEART RATE: 101 BPM | BODY MASS INDEX: 42.85 KG/M2 | SYSTOLIC BLOOD PRESSURE: 136 MMHG | OXYGEN SATURATION: 96 % | RESPIRATION RATE: 16 BRPM

## 2024-06-14 DIAGNOSIS — E88.810 ABDOMINAL OBESITY AND METABOLIC SYNDROME: ICD-10-CM

## 2024-06-14 DIAGNOSIS — E66.9 ABDOMINAL OBESITY AND METABOLIC SYNDROME: ICD-10-CM

## 2024-06-14 DIAGNOSIS — E66.01 CLASS 3 SEVERE OBESITY WITH SERIOUS COMORBIDITY AND BODY MASS INDEX (BMI) OF 40.0 TO 44.9 IN ADULT, UNSPECIFIED OBESITY TYPE (HCC): Primary | ICD-10-CM

## 2024-06-14 DIAGNOSIS — R73.01 IMPAIRED FASTING GLUCOSE: ICD-10-CM

## 2024-06-14 PROCEDURE — 3008F BODY MASS INDEX DOCD: CPT | Performed by: PHYSICIAN ASSISTANT

## 2024-06-14 PROCEDURE — 99213 OFFICE O/P EST LOW 20 MIN: CPT | Performed by: PHYSICIAN ASSISTANT

## 2024-06-14 PROCEDURE — 3079F DIAST BP 80-89 MM HG: CPT | Performed by: PHYSICIAN ASSISTANT

## 2024-06-14 PROCEDURE — 3075F SYST BP GE 130 - 139MM HG: CPT | Performed by: PHYSICIAN ASSISTANT

## 2024-06-14 RX ORDER — LOSARTAN POTASSIUM 50 MG/1
25 TABLET ORAL DAILY
COMMUNITY
Start: 2024-05-28

## 2024-06-14 NOTE — PROGRESS NOTES
Subjective:   Patient ID: Estefany Retana is a 43 year old female.    HPI  Patient presents for follow up of weight management  She was seen one month ago and at that time started on semaglutide  So far she is down 3 lbs  She had some loose stool the first week but manageable  She has had persistent heartburn symptoms - better with tums  She saw the nutritionist - notices she feels sick if she eats too many carbs, feels much better with more protein  Her portions are about half the size  She is feeling full faster and for longer  She is disappointed that her weight has not come down by much  But she feels good about all of the positive changes she has made  Water intake is getting better  She has not been exercising as much but that is her goal this month  She feels great overall and would like to continue treatment    History/Other:   Review of Systems  Current Outpatient Medications   Medication Sig Dispense Refill    losartan 50 MG Oral Tab Take 0.5 tablets (25 mg total) by mouth daily.      metoprolol succinate ER 25 MG Oral Tablet 24 Hr       rosuvastatin 40 MG Oral Tab Take 1 tablet (40 mg total) by mouth daily.      levothyroxine 200 MCG Oral Tab Take 1 tablet (200 mcg total) by mouth before breakfast. 90 tablet 0    venlafaxine  MG Oral Capsule SR 24 Hr Take 1 capsule (150 mg total) by mouth 2 (two) times daily. 180 capsule 1    busPIRone 15 MG Oral Tab Take 1 tablet (15 mg total) by mouth 2 (two) times daily. 180 tablet 1    ALPRAZolam 0.5 MG Oral Tab Take 0.5 tablets (0.25 mg total) by mouth 3 (three) times daily as needed for Anxiety.      aspirin 81 MG Oral Tab EC Take 1 tablet (81 mg total) by mouth daily. 90 tablet 3    clopidogrel 75 MG Oral Tab Take 1 tablet (75 mg total) by mouth daily. 30 tablet 11     Allergies:  Allergies   Allergen Reactions    Levofloxacin DIARRHEA       Objective:   Physical Exam    Assessment & Plan:   1. Class 3 severe obesity with serious comorbidity and body mass  index (BMI) of 40.0 to 44.9 in adult, unspecified obesity type (HCC)  2. Impaired fasting glucose  3. Abdominal obesity and metabolic syndrome  Patient feeling great on medication and has made many positive changes already which she is proud of. Her weight is down 3 lbs. Discussed diet and exercise changes. Also discussed using a scale that measures other parameters than weight. Will increase dose to 0.5 for four doses and plan to titrate up every month as long as she is tolerating meds well. She still has a sample ozempic pen with enough 0.5 mg doses. Will send in rx for 1 mg now so that this can be authorized by the time she is ready to increase. Recommend follow up in 3-6 months, sooner prn.   - semaglutide-weight management 1 MG/0.5ML Subcutaneous Solution Auto-injector; Inject 0.5 mL (1 mg total) into the skin once a week for 4 doses.  Dispense: 2 mL; Refill: 0

## 2024-06-14 NOTE — PATIENT INSTRUCTIONS
Here is the link to the scale we talked about:    [email or web address]    Goals:  Visceral fat - 10 or less  BMR (basal metabolic rate) - above 1400 kcal

## 2024-06-26 ENCOUNTER — HOSPITAL ENCOUNTER (EMERGENCY)
Age: 44
Discharge: HOME | End: 2024-06-26

## 2024-06-26 VITALS
SYSTOLIC BLOOD PRESSURE: 130 MMHG | DIASTOLIC BLOOD PRESSURE: 78 MMHG | HEART RATE: 89 BPM | RESPIRATION RATE: 15 BRPM | TEMPERATURE: 99 F | OXYGEN SATURATION: 97 %

## 2024-06-26 DIAGNOSIS — S39.012A STRAIN OF LUMBAR REGION, INITIAL ENCOUNTER: Primary | ICD-10-CM

## 2024-06-26 PROCEDURE — 99283 EMERGENCY DEPT VISIT LOW MDM: CPT

## 2024-06-26 RX ORDER — METHYLPREDNISOLONE 4 MG/1
TABLET ORAL
Qty: 1 EACH | Refills: 0 | Status: SHIPPED | OUTPATIENT
Start: 2024-06-26

## 2024-06-26 RX ORDER — DEXAMETHASONE 4 MG/1
12 TABLET ORAL ONCE
Status: COMPLETED | OUTPATIENT
Start: 2024-06-26 | End: 2024-06-26

## 2024-06-26 RX ORDER — LIDOCAINE 4 G/G
1 PATCH TOPICAL EVERY 24 HOURS
Qty: 10 PATCH | Refills: 0 | Status: SHIPPED | OUTPATIENT
Start: 2024-06-26

## 2024-06-26 RX ORDER — DIAZEPAM 5 MG/1
TABLET ORAL
Qty: 20 TABLET | Refills: 0 | Status: SHIPPED | OUTPATIENT
Start: 2024-06-26

## 2024-06-26 NOTE — DISCHARGE INSTRUCTIONS
Continue steroid taper tomorrow.  1 lidocaine patch every 24 hours.  Muscle relaxant as needed.  This will cause sedation.  Do not drive on this medication.  Follow-up with primary care physician for consideration of physical therapy

## 2024-06-26 NOTE — ED PROVIDER NOTES
Patient Seen in: [facility] In [place]      History     Chief Complaint   Patient presents with    Back Pain     Stated Complaint: lower back pain x 2 days    Subjective:   HPI    43-year-old female.  Medical history of previous MI with subsequent triple bypass .  Currently anticoagulated.  Medical history of migraines, hypertension, thyroid dysfunction, obesity and anxiety.  2 days prior to arrival, the patient had bent over to pick something up when she suddenly experienced a spasmodic type pain to the left lower back.  She has had persistent episodes of pain to this region that seem particularly exacerbated by transitioning from seated to standing or bending.  When she holds still she has minimal discomfort.  She has been taking Tylenol which offers minimal relief.  She denies any foot drop or weakness.  No incontinence.  No fever or chills.  No chest pain or shortness of breath.  No headache or dizziness.  No nausea, vomiting or urinary complaints.    Objective:   Past Medical History:    Allergic rhinitis    Anxiety state    Coronary atherosclerosis    Depression    Disorder of thyroid    Heart attack (HCC)    High blood pressure    Hx of migraines    Hypothyroidism    Migraines    Pre-eclampsia (HCC)    Unspecified essential hypertension    Unspecified hypothyroidism              Past Surgical History:   Procedure Laterality Date    Anesth,vaginal procedures  2021    Suturing of Vaginal Cuff          Cabg      Cabg      Hysterectomy  2021    Dr. otero total laparoscopic hysterectomy, bilateral salpingectomy, postop cuff bleeding    Other surgical history  2000    Ovarian cyst                Social History     Socioeconomic History    Marital status:    Tobacco Use    Smoking status: Never    Smokeless tobacco: Never   Vaping Use    Vaping status: Never Used   Substance and Sexual Activity    Alcohol use: Yes     Alcohol/week: 0.0 standard drinks of alcohol      Comment: very rare    Drug use: No    Sexual activity: Yes     Partners: Male     Birth control/protection: Hysterectomy   Other Topics Concern    Caffeine Concern Yes     Comment: 2 - 1 soda 1 coffee    Stress Concern Yes    Weight Concern Yes    Special Diet Yes     Comment: gluten free    Exercise No    Seat Belt Yes     Social Determinants of Health     Financial Resource Strain: Low Risk  (5/11/2023)    Financial Resource Strain     Difficulty of Paying Living Expenses: Not very hard     Med Affordability: No   Transportation Needs: No Transportation Needs (5/11/2023)    Transportation Needs     Lack of Transportation: No              Review of Systems    Positive for stated Chief Complaint: Back Pain    Other systems are as noted in HPI.  Constitutional and vital signs reviewed.      All other systems reviewed and negative except as noted above.    Physical Exam     ED Triage Vitals [06/26/24 1614]   BP (!) 137/100   Pulse 93   Resp 16   Temp 98.5 °F (36.9 °C)   Temp src Temporal   SpO2 96 %   O2 Device None (Room air)       Current Vitals:   Vital Signs  BP: (!) 137/100  Pulse: 93  Resp: 16  Temp: 98.5 °F (36.9 °C)  Temp src: Temporal    Oxygen Therapy  SpO2: 96 %  O2 Device: None (Room air)            Physical Exam    Gen: Well appearing, well groomed, alert and aware x 3  Neck: Supple, full range of motion, no thyromegaly or lymphadenopathy.  Lung: No distress, RR, no retraction, breath sounds are clear bilaterally  Cardio: Regular rate and rhythm, normal S1-S2, no murmur appreciable  Extremities: Full ROM, strength and sensation are equal.  Two-point discrimination is intact.  DTRs are appropriate and equal.  Back: Left lateral paralumbar muscular tenderness to palpation.  No point tenderness.  This extends into the superior aspect of the piriformis.  Skin: No sign of trauma, Skin warm and dry, no induration or sign of infection.   Neuro: Cranial nerves intact, Normal Gait.    ED Course   Labs Reviewed  - No data to display                   MDM      My supervising physician was involved in the management of this patient.    Patient ambulating without difficulty.  Normal vital signs.  Excellent strength to the lower extremities.  No complaint of weakness or saddle anesthesias.  Left paralumbar muscular tenderness that extends into the piriformis.    Patient received a dosage of oral steroid and a 5% lidocaine patch to the region.  She was provided with a course of muscle relaxants and a steroid taper.  I encouraged her to follow-up with her primary care physician for consideration of physical therapy.      Continue steroid taper tomorrow.  1 lidocaine patch every 24 hours.  Muscle relaxant as needed.  This will cause sedation.  Do not drive on this medication.  Follow-up with primary care physician for consideration of physical therapy                           Medical Decision Making      Disposition and Plan     Clinical Impression:  1. Strain of lumbar region, initial encounter         Disposition:  Discharge  6/26/2024  5:10 pm    Follow-up:  Magrie Thornton DO  [address] [place] [id number]  [phone]    Follow up            Medications Prescribed:  Current Discharge Medication List        START taking these medications    Details   methylPREDNISolone (MEDROL) 4 MG Oral Tablet Therapy Pack Dosepack: take as directed  Qty: 1 each, Refills: 0      diazePAM 5 MG Oral Tab 1 tablet every 6-8 hours as needed for muscle spasm  Qty: 20 tablet, Refills: 0    Associated Diagnoses: Strain of lumbar region, initial encounter      lidocaine (HM LIDOCAINE PATCH) 4 % External Patch Place 1 patch onto the skin daily.  Qty: 10 patch, Refills: 0

## 2024-06-26 NOTE — ED INITIAL ASSESSMENT (HPI)
Pt states 2 days ago getting out of chair  And sudden onset  of left lower back pain  Radiates down left leg

## 2024-07-01 ENCOUNTER — PATIENT OUTREACH (OUTPATIENT)
Dept: CASE MANAGEMENT | Age: 44
End: 2024-07-01

## 2024-07-01 NOTE — PROGRESS NOTES
1st attempt ED f/up appt request :    Margie Thornton, DO  [address] [place] [id number]  [phone]  DECLINED - pt is currently on vacation with her family & will call to schedule on her own upon her return.      No further assistance needed.    Closing encounter

## 2024-07-17 ENCOUNTER — OFFICE VISIT (OUTPATIENT)
Dept: FAMILY MEDICINE CLINIC | Facility: CLINIC | Age: 44
End: 2024-07-17
Payer: COMMERCIAL

## 2024-07-17 VITALS
HEART RATE: 101 BPM | DIASTOLIC BLOOD PRESSURE: 82 MMHG | WEIGHT: 267 LBS | BODY MASS INDEX: 42 KG/M2 | OXYGEN SATURATION: 99 % | SYSTOLIC BLOOD PRESSURE: 124 MMHG

## 2024-07-17 DIAGNOSIS — E66.01 CLASS 3 SEVERE OBESITY DUE TO EXCESS CALORIES WITH SERIOUS COMORBIDITY AND BODY MASS INDEX (BMI) OF 40.0 TO 44.9 IN ADULT (HCC): Primary | ICD-10-CM

## 2024-07-17 PROCEDURE — 3074F SYST BP LT 130 MM HG: CPT | Performed by: PHYSICIAN ASSISTANT

## 2024-07-17 PROCEDURE — 99213 OFFICE O/P EST LOW 20 MIN: CPT | Performed by: PHYSICIAN ASSISTANT

## 2024-07-17 PROCEDURE — 3079F DIAST BP 80-89 MM HG: CPT | Performed by: PHYSICIAN ASSISTANT

## 2024-07-17 NOTE — PROGRESS NOTES
Subjective:   Patient ID: Estefany Retana is a 43 year old female.    HPI  Patient presents for follow up of weight management  She has been doing very well on Ozempic samples, now injecting 0.5 mg (2 doses at a time)  So far she is down 10 lbs or so  She has cut down her carbs, feels much better with more protein  Her portions are about half the size  She is feeling full faster and for longer  She feels good about all of the positive changes she has made  Physically feeling a lot better  Water intake is getting better  She is trying to exercise more  She feels great overall and would like to continue treatment  She has one more dose of the sample pen and then will need to switch to prescription    History/Other:   Review of Systems   Constitutional:  Negative for chills, diaphoresis and fever.   Eyes:  Negative for visual disturbance.   Respiratory:  Negative for chest tightness and shortness of breath.    Cardiovascular:  Negative for chest pain, palpitations and leg swelling.   Neurological:  Negative for dizziness, light-headedness and headaches.     Current Outpatient Medications   Medication Sig Dispense Refill    diazePAM 5 MG Oral Tab 1 tablet every 6-8 hours as needed for muscle spasm 20 tablet 0    lidocaine (HM LIDOCAINE PATCH) 4 % External Patch Place 1 patch onto the skin daily. 10 patch 0    losartan 50 MG Oral Tab Take 0.5 tablets (25 mg total) by mouth daily.      metoprolol succinate ER 25 MG Oral Tablet 24 Hr       rosuvastatin 40 MG Oral Tab Take 1 tablet (40 mg total) by mouth daily.      levothyroxine 200 MCG Oral Tab Take 1 tablet (200 mcg total) by mouth before breakfast. 90 tablet 0    venlafaxine  MG Oral Capsule SR 24 Hr Take 1 capsule (150 mg total) by mouth 2 (two) times daily. 180 capsule 1    busPIRone 15 MG Oral Tab Take 1 tablet (15 mg total) by mouth 2 (two) times daily. 180 tablet 1    ALPRAZolam 0.5 MG Oral Tab Take 0.5 tablets (0.25 mg total) by mouth 3 (three) times  daily as needed for Anxiety.      aspirin 81 MG Oral Tab EC Take 1 tablet (81 mg total) by mouth daily. 90 tablet 3    clopidogrel 75 MG Oral Tab Take 1 tablet (75 mg total) by mouth daily. 30 tablet 11     Allergies:  Allergies   Allergen Reactions    Levofloxacin DIARRHEA       Objective:   Physical Exam  Vitals and nursing note reviewed.   Constitutional:       Appearance: She is well-developed.   HENT:      Head: Normocephalic and atraumatic.   Eyes:      Conjunctiva/sclera: Conjunctivae normal.      Pupils: Pupils are equal, round, and reactive to light.   Cardiovascular:      Rate and Rhythm: Normal rate and regular rhythm.      Heart sounds: Normal heart sounds.   Pulmonary:      Effort: Pulmonary effort is normal.      Breath sounds: Normal breath sounds. No wheezing or rales.   Musculoskeletal:      Cervical back: Normal range of motion and neck supple.   Neurological:      Mental Status: She is alert.         Assessment & Plan:   1. Class 3 severe obesity due to excess calories with serious comorbidity and body mass index (BMI) of 40.0 to 44.9 in adult (HCC)  Doing very well on sample ozempic. Will try and switch her over to Rx semaglutide 1.0 mg dose weekly for 4 doses then reassess. Continue working on diet and exercise. Follow up in 4 weeks, if doing well we can increase dose to 1.7 mg at that point.

## 2024-09-13 DIAGNOSIS — F33.2 SEVERE RECURRENT MAJOR DEPRESSION WITHOUT PSYCHOTIC FEATURES (HCC): ICD-10-CM

## 2024-09-13 DIAGNOSIS — F41.1 GAD (GENERALIZED ANXIETY DISORDER): ICD-10-CM

## 2024-09-13 RX ORDER — ALPRAZOLAM 0.5 MG
0.25 TABLET ORAL 3 TIMES DAILY PRN
Qty: 30 TABLET | Refills: 2 | Status: SHIPPED | OUTPATIENT
Start: 2024-09-13

## 2024-09-13 NOTE — TELEPHONE ENCOUNTER
.A refill request was received for:  Requested Prescriptions     Pending Prescriptions Disp Refills    ALPRAZolam 0.5 MG Oral Tab  0     Sig: Take 0.5 tablets (0.25 mg total) by mouth 3 (three) times daily as needed for Anxiety.       Last refill date:   5//9/2023    Last office visit: 7/17/2024    Follow up due:  Future Appointments   Date Time Provider Department Center   1/20/2025  8:00 AM Lauren Wilder PA-C [facility] [address]

## 2024-09-16 DIAGNOSIS — E06.3 HYPOTHYROIDISM DUE TO HASHIMOTO'S THYROIDITIS: ICD-10-CM

## 2024-09-16 RX ORDER — LEVOTHYROXINE SODIUM 200 UG/1
200 TABLET ORAL
Qty: 90 TABLET | Refills: 0 | Status: SHIPPED | OUTPATIENT
Start: 2024-09-16

## 2024-09-16 NOTE — TELEPHONE ENCOUNTER
A refill request was received for:  Requested Prescriptions     Pending Prescriptions Disp Refills    levothyroxine 200 MCG Oral Tab 90 tablet 0     Sig: Take 1 tablet (200 mcg total) by mouth before breakfast.       Last refill date:  03/04/24     Last office visit:07/17/24    Future Appointments   Date Time Provider Department Center   1/20/2025  8:00 AM Lauren Wilder PA-C [facility] [address]

## 2024-10-06 DIAGNOSIS — F41.1 GAD (GENERALIZED ANXIETY DISORDER): ICD-10-CM

## 2024-10-06 DIAGNOSIS — F33.2 SEVERE RECURRENT MAJOR DEPRESSION WITHOUT PSYCHOTIC FEATURES (HCC): ICD-10-CM

## 2024-10-07 RX ORDER — BUSPIRONE HYDROCHLORIDE 15 MG/1
15 TABLET ORAL 2 TIMES DAILY
Qty: 180 TABLET | Refills: 1 | OUTPATIENT
Start: 2024-10-07

## 2024-10-07 RX ORDER — VENLAFAXINE HYDROCHLORIDE 150 MG/1
150 CAPSULE, EXTENDED RELEASE ORAL 2 TIMES DAILY
Qty: 180 CAPSULE | Refills: 1 | OUTPATIENT
Start: 2024-10-07

## 2024-10-14 DIAGNOSIS — F41.1 GAD (GENERALIZED ANXIETY DISORDER): ICD-10-CM

## 2024-10-14 DIAGNOSIS — F33.2 SEVERE RECURRENT MAJOR DEPRESSION WITHOUT PSYCHOTIC FEATURES (HCC): ICD-10-CM

## 2024-10-14 RX ORDER — BUSPIRONE HYDROCHLORIDE 15 MG/1
15 TABLET ORAL 2 TIMES DAILY
Qty: 180 TABLET | Refills: 1 | Status: SHIPPED | OUTPATIENT
Start: 2024-10-14

## 2024-10-14 RX ORDER — VENLAFAXINE HYDROCHLORIDE 150 MG/1
150 CAPSULE, EXTENDED RELEASE ORAL 2 TIMES DAILY
Qty: 180 CAPSULE | Refills: 1 | Status: SHIPPED | OUTPATIENT
Start: 2024-10-14

## 2024-10-14 NOTE — TELEPHONE ENCOUNTER
RX's pended.  Last ov 7/17 for med follow up  Seeing you again 11/6    Please approve if appropriate. Thanks.

## 2024-11-09 ENCOUNTER — LAB ENCOUNTER (OUTPATIENT)
Dept: LAB | Age: 44
End: 2024-11-09
Attending: INTERNAL MEDICINE
Payer: COMMERCIAL

## 2024-11-09 DIAGNOSIS — E78.2 MIXED HYPERLIPIDEMIA: Primary | ICD-10-CM

## 2024-11-09 LAB
CHOLEST SERPL-MCNC: 105 MG/DL (ref ?–200)
FASTING PATIENT LIPID ANSWER: YES
HDLC SERPL-MCNC: 39 MG/DL (ref 40–59)
LDLC SERPL CALC-MCNC: 43 MG/DL (ref ?–100)
NONHDLC SERPL-MCNC: 66 MG/DL (ref ?–130)
TRIGL SERPL-MCNC: 128 MG/DL (ref 30–149)
VLDLC SERPL CALC-MCNC: 18 MG/DL (ref 0–30)

## 2024-11-09 PROCEDURE — 80061 LIPID PANEL: CPT

## 2024-11-09 PROCEDURE — 36415 COLL VENOUS BLD VENIPUNCTURE: CPT

## 2024-11-25 ENCOUNTER — OFFICE VISIT (OUTPATIENT)
Dept: FAMILY MEDICINE CLINIC | Facility: CLINIC | Age: 44
End: 2024-11-25
Payer: COMMERCIAL

## 2024-11-25 VITALS
SYSTOLIC BLOOD PRESSURE: 112 MMHG | BODY MASS INDEX: 39.24 KG/M2 | HEIGHT: 67 IN | DIASTOLIC BLOOD PRESSURE: 88 MMHG | RESPIRATION RATE: 16 BRPM | WEIGHT: 250 LBS | OXYGEN SATURATION: 99 % | HEART RATE: 97 BPM

## 2024-11-25 DIAGNOSIS — E66.9 OBESITY (BMI 30-39.9): ICD-10-CM

## 2024-11-25 DIAGNOSIS — E06.3 HYPOTHYROIDISM DUE TO HASHIMOTO'S THYROIDITIS: ICD-10-CM

## 2024-11-25 DIAGNOSIS — Z12.31 VISIT FOR SCREENING MAMMOGRAM: ICD-10-CM

## 2024-11-25 DIAGNOSIS — I25.10 CAD, MULTIPLE VESSEL: ICD-10-CM

## 2024-11-25 DIAGNOSIS — F41.1 GAD (GENERALIZED ANXIETY DISORDER): ICD-10-CM

## 2024-11-25 DIAGNOSIS — Z95.1 S/P CABG X 3: ICD-10-CM

## 2024-11-25 DIAGNOSIS — Z00.00 ROUTINE GENERAL MEDICAL EXAMINATION AT A HEALTH CARE FACILITY: Primary | ICD-10-CM

## 2024-11-25 DIAGNOSIS — I10 BENIGN ESSENTIAL HTN: ICD-10-CM

## 2024-11-25 DIAGNOSIS — E55.9 HYPOVITAMINOSIS D: ICD-10-CM

## 2024-11-25 DIAGNOSIS — F33.2 SEVERE RECURRENT MAJOR DEPRESSION WITHOUT PSYCHOTIC FEATURES (HCC): ICD-10-CM

## 2024-11-25 DIAGNOSIS — E78.2 MIXED HYPERLIPIDEMIA: ICD-10-CM

## 2024-11-25 PROBLEM — R73.9 HYPERGLYCEMIA: Status: RESOLVED | Noted: 2023-04-30 | Resolved: 2024-11-25

## 2024-11-25 PROBLEM — E88.810 ABDOMINAL OBESITY AND METABOLIC SYNDROME: Status: RESOLVED | Noted: 2024-06-07 | Resolved: 2024-11-25

## 2024-11-25 PROBLEM — R73.01 IMPAIRED FASTING GLUCOSE: Status: RESOLVED | Noted: 2024-06-07 | Resolved: 2024-11-25

## 2024-11-25 RX ORDER — LEVOTHYROXINE SODIUM 200 UG/1
200 TABLET ORAL
Qty: 90 TABLET | Refills: 3 | Status: SHIPPED | OUTPATIENT
Start: 2024-11-25

## 2024-11-25 NOTE — PROGRESS NOTES
Subjective:   Patient ID: Estefany Retana is a 44 year old female.    HPI  Patient presents today requesting physical exam.     Diet: could be better, getting a good variety of foods, no red meat   Exercise: recently joined [employer]. 3x per week   Tobacco use: denies  Drug use: denies  Alcohol use: denies  Sexually active: with spouse  LMP: TAH 2021  Marital status:  2 children  Occupation: teacher     Health maintenance:  Pap/Hpv: 3/30/18 negative TAH 2021  Mammogram: never  Performs SBEs: yes  Dexa scan: never  Colonoscopy: never  Discussed age appropriate vaccines    H/o anxiety and depression  Has seen psych/therapy  She has been stable on buspar and effexor for the last 2-3 years  Takes xanax only as needed for panic which helps  She is comfortable with our group managing her medications     H/o CAD s/p stenting of LAD and circumflex c/b subsequent MI  Then underwent CABG 5/5/23  Following with cardiology regularly  Meds reviewed  Recent lipids are excellent  She is working hard on weight loss and healthy diet.  Reports increased appetite and giving in to cravings     History/Other:   Review of Systems   Constitutional: Negative.    HENT: Negative.     Eyes: Negative.    Respiratory: Negative.     Cardiovascular: Negative.    Gastrointestinal: Negative.    Genitourinary: Negative.    Musculoskeletal: Negative.    Skin: Negative.    Neurological: Negative.    Psychiatric/Behavioral: Negative.       Current Outpatient Medications   Medication Sig Dispense Refill    busPIRone 15 MG Oral Tab Take 1 tablet (15 mg total) by mouth 2 (two) times daily. 180 tablet 1    venlafaxine  MG Oral Capsule SR 24 Hr Take 1 capsule (150 mg total) by mouth 2 (two) times daily. 180 capsule 1    levothyroxine 200 MCG Oral Tab Take 1 tablet (200 mcg total) by mouth before breakfast. 90 tablet 0    ALPRAZolam 0.5 MG Oral Tab Take 0.5 tablets (0.25 mg total) by mouth 3 (three) times daily as needed for  Anxiety. 30 tablet 2    losartan 50 MG Oral Tab Take 0.5 tablets (25 mg total) by mouth daily.      metoprolol succinate ER 25 MG Oral Tablet 24 Hr       rosuvastatin 40 MG Oral Tab Take 1 tablet (40 mg total) by mouth daily.      aspirin 81 MG Oral Tab EC Take 1 tablet (81 mg total) by mouth daily. 90 tablet 3    clopidogrel 75 MG Oral Tab Take 1 tablet (75 mg total) by mouth daily. 30 tablet 11     Allergies:Allergies[1]    Objective:   Physical Exam  Constitutional:       Appearance: Normal appearance.   HENT:      Head: Normocephalic and atraumatic.      Right Ear: Tympanic membrane, ear canal and external ear normal.      Left Ear: Tympanic membrane, ear canal and external ear normal.      Mouth/Throat:      Pharynx: Oropharynx is clear. No oropharyngeal exudate or posterior oropharyngeal erythema.      Tonsils: No tonsillar exudate.   Eyes:      Pupils: Pupils are equal, round, and reactive to light.   Neck:      Thyroid: No thyromegaly.   Cardiovascular:      Rate and Rhythm: Normal rate and regular rhythm.      Heart sounds: Normal heart sounds. No murmur heard.     No gallop.   Pulmonary:      Breath sounds: Normal breath sounds. No wheezing, rhonchi or rales.   Abdominal:      General: Bowel sounds are normal.      Palpations: Abdomen is soft.      Tenderness: There is no abdominal tenderness.   Skin:     General: Skin is warm and dry.      Findings: No erythema or rash.          Neurological:      General: No focal deficit present.      Mental Status: She is alert.   Psychiatric:         Attention and Perception: Attention and perception normal.         Mood and Affect: Mood and affect normal.         Behavior: Behavior normal. Behavior is cooperative.         Thought Content: Thought content normal.         Cognition and Memory: Cognition and memory normal.         Judgment: Judgment normal.         Assessment & Plan:   1. Routine general medical examination at a health care facility  Physical exam is  unremarkable.  Check fasting labs.  Health maintenance issues discussed. Encouraged routine vaccines.  Advised healthy diet and regular exercise.  Annual physicals.   - CBC With Differential With Platelet; Future  - Comp Metabolic Panel (14); Future  - TSH W Reflex To Free T4; Future  - Vitamin B12; Future  - Vitamin D; Future    2. Visit for screening mammogram  - [facility] ROSA 2D+3D SCREENING BILAT (CPT=77067/71944); Future    3. Obesity (BMI 30-39.9)  -Discussed Topiramate. Patient would like to stay off medication at this time     4. S/P CABG x 3  -Continue to follow Cardiology             [1]   Allergies  Allergen Reactions    Levofloxacin DIARRHEA

## 2024-11-25 NOTE — PROGRESS NOTES
Subjective:   Patient ID: Estefany Retana is a 44 year old female.    HPI  Patient presents today requesting physical exam.     Diet: could be better, getting a good variety of foods, no red meat  Exercise: no regular regimen  Tobacco use: denies  Drug use: denies  Alcohol use: denies  Sexually active: with spouse  LMP: WILFREDOH 2021  Marital status:  2 children  Occupation: teacher     Health maintenance:  Pap/Hpv: 3/30/18 negative TAH 2021  Mammogram: never  Performs SBEs: yes  Dexa scan: never  Colonoscopy: never  Discussed age appropriate vaccines     H/o anxiety and depression  Has seen psych/therapy  She has been stable on buspar and effexor   Takes xanax only as needed for panic which helps  Would like to continue same dose of meds     H/o CAD s/p stenting of LAD and circumflex c/b subsequent MI  Then underwent CABG 5/5/23  Following with cardiology regularly  Meds reviewed  Recent lipids are excellent although planning to start Leqvio  EF 55%  She is working hard on weight loss and healthy diet.    History/Other:   Review of Systems   Constitutional:  Negative for chills, fatigue and fever.   HENT:  Negative for congestion, ear pain, rhinorrhea and sore throat.    Eyes:  Negative for visual disturbance.   Respiratory:  Negative for cough, shortness of breath and wheezing.    Cardiovascular:  Negative for chest pain, palpitations and leg swelling.   Gastrointestinal:  Negative for abdominal pain, diarrhea, nausea and vomiting.   Genitourinary:  Negative for dysuria, frequency and hematuria.   Musculoskeletal:  Negative for arthralgias, gait problem and myalgias.   Skin:  Negative for rash.   Neurological:  Negative for weakness, light-headedness and headaches.   Hematological:  Negative for adenopathy.   Psychiatric/Behavioral:  Negative for dysphoric mood. The patient is not nervous/anxious.      Current Outpatient Medications   Medication Sig Dispense Refill    busPIRone 15 MG Oral Tab Take 1 tablet  (15 mg total) by mouth 2 (two) times daily. 180 tablet 1    venlafaxine  MG Oral Capsule SR 24 Hr Take 1 capsule (150 mg total) by mouth 2 (two) times daily. 180 capsule 1    levothyroxine 200 MCG Oral Tab Take 1 tablet (200 mcg total) by mouth before breakfast. 90 tablet 0    ALPRAZolam 0.5 MG Oral Tab Take 0.5 tablets (0.25 mg total) by mouth 3 (three) times daily as needed for Anxiety. 30 tablet 2    losartan 50 MG Oral Tab Take 0.5 tablets (25 mg total) by mouth daily.      metoprolol succinate ER 25 MG Oral Tablet 24 Hr       rosuvastatin 40 MG Oral Tab Take 1 tablet (40 mg total) by mouth daily.      aspirin 81 MG Oral Tab EC Take 1 tablet (81 mg total) by mouth daily. 90 tablet 3    clopidogrel 75 MG Oral Tab Take 1 tablet (75 mg total) by mouth daily. 30 tablet 11     Allergies:Allergies[1]    Objective:   Physical Exam  Vitals and nursing note reviewed.   Constitutional:       General: She is not in acute distress.     Appearance: Normal appearance. She is well-developed.   HENT:      Head: Normocephalic and atraumatic.      Right Ear: Tympanic membrane, ear canal and external ear normal.      Left Ear: Tympanic membrane, ear canal and external ear normal.      Nose: Nose normal.      Mouth/Throat:      Mouth: Mucous membranes are moist.   Eyes:      Extraocular Movements: Extraocular movements intact.      Conjunctiva/sclera: Conjunctivae normal.      Pupils: Pupils are equal, round, and reactive to light.   Neck:      Thyroid: No thyromegaly.   Cardiovascular:      Rate and Rhythm: Normal rate and regular rhythm.      Pulses: Normal pulses.      Heart sounds: Normal heart sounds.   Pulmonary:      Effort: Pulmonary effort is normal.      Breath sounds: Normal breath sounds. No wheezing or rales.   Abdominal:      General: Bowel sounds are normal. There is no distension.      Palpations: Abdomen is soft. There is no mass.      Tenderness: There is no abdominal tenderness.   Musculoskeletal:          General: No tenderness. Normal range of motion.      Cervical back: Normal range of motion and neck supple.   Lymphadenopathy:      Cervical: No cervical adenopathy.   Skin:     General: Skin is warm and dry.      Findings: No rash.   Neurological:      General: No focal deficit present.      Mental Status: She is alert and oriented to person, place, and time.   Psychiatric:         Mood and Affect: Mood normal.         Behavior: Behavior normal.         Assessment & Plan:   1. Routine general medical examination at a health care facility  Patient is generally healthy.  Physical exam is unremarkable.  Check fasting labs.  Health maintenance issues discussed. Encouraged routine vaccines.  Advised healthy diet and regular exercise.  Annual physicals.  - CBC With Differential With Platelet; Future  - Comp Metabolic Panel (14); Future  - TSH W Reflex To Free T4; Future  - Vitamin B12; Future  - Vitamin D; Future    2. Visit for screening mammogram  - [facility] ROSA 2D+3D SCREENING BILAT (CPT=77067/49350); Future    3. Obesity (BMI 30-39.9)  Weight is down 30 lbs since starting semaglutide.  She is noticing some increased cravings at times and will occasionally indulge.  Otherwise maintaining a healthy diet, continue regular exercise.  Will continue the same dose of semaglutide but we did discuss options to switch to tirzepatide or add topiramate.  Refills as needed.  Follow-up in months.    4. Hypothyroidism due to Hashimoto's thyroiditis  Recheck TSH and adjust dose of LT4 if needed.  - levothyroxine 200 MCG Oral Tab; Take 1 tablet (200 mcg total) by mouth before breakfast.  Dispense: 90 tablet; Refill: 3    5. Severe recurrent major depression without psychotic features (HCC)  6. MICHAEL (generalized anxiety disorder)  Well-controlled.  Continue same dose of medications.  Refills as needed, follow-up in 6 months.    7. S/P CABG x 3  8. CAD, multiple vessel  9. Benign essential HTN  10. Mixed hyperlipidemia  Patient is  asymptomatic.  Heart sounds normal on exam.  Blood pressure well-controlled.  Continue to optimize risk factors and maintain regular follow-up with cardiology.    11. Hypovitaminosis D  Recheck levels and supplement if needed.             [1]   Allergies  Allergen Reactions    Levofloxacin DIARRHEA

## 2024-12-03 ENCOUNTER — PATIENT MESSAGE (OUTPATIENT)
Dept: FAMILY MEDICINE CLINIC | Facility: CLINIC | Age: 44
End: 2024-12-03

## 2024-12-03 DIAGNOSIS — E66.01 CLASS 3 SEVERE OBESITY WITH SERIOUS COMORBIDITY AND BODY MASS INDEX (BMI) OF 40.0 TO 44.9 IN ADULT, UNSPECIFIED OBESITY TYPE (HCC): ICD-10-CM

## 2024-12-03 DIAGNOSIS — E66.9 ABDOMINAL OBESITY AND METABOLIC SYNDROME: ICD-10-CM

## 2024-12-03 DIAGNOSIS — E88.810 ABDOMINAL OBESITY AND METABOLIC SYNDROME: ICD-10-CM

## 2024-12-03 DIAGNOSIS — E66.813 CLASS 3 SEVERE OBESITY WITH SERIOUS COMORBIDITY AND BODY MASS INDEX (BMI) OF 40.0 TO 44.9 IN ADULT, UNSPECIFIED OBESITY TYPE (HCC): ICD-10-CM

## 2024-12-03 DIAGNOSIS — R73.01 IMPAIRED FASTING GLUCOSE: ICD-10-CM

## 2024-12-04 NOTE — TELEPHONE ENCOUNTER
Patient requesting refill of semaglutide and would like to increase dose.  Pt had visit on 11/25/24  Please advise

## 2024-12-23 ENCOUNTER — OFFICE VISIT (OUTPATIENT)
Facility: CLINIC | Age: 44
End: 2024-12-23
Payer: COMMERCIAL

## 2024-12-23 VITALS
HEART RATE: 100 BPM | HEIGHT: 67 IN | DIASTOLIC BLOOD PRESSURE: 64 MMHG | SYSTOLIC BLOOD PRESSURE: 116 MMHG | WEIGHT: 252.19 LBS | BODY MASS INDEX: 39.58 KG/M2

## 2024-12-23 DIAGNOSIS — Z12.31 ENCOUNTER FOR SCREENING MAMMOGRAM FOR BREAST CANCER: Primary | ICD-10-CM

## 2024-12-23 NOTE — PROGRESS NOTES
Estefany Retana is a 44 year old female  Patient's last menstrual period was 2021 (approximate).   Chief Complaint   Patient presents with    Wellness Visit     Last pap 3/30/18 neg   Hyst     Other     Reviewed Preventative/Wellness form with patient.    .     She does not have any vaginal bleeding or dryness with intercourse.  Her cardiologist checks her blood work.  No family history of colon cancer.  She has had a flu shot.  No hot flashes or night sweats.    OBSTETRICS HISTORY:  OB History    Para Term  AB Living   2 2 2 0 0 2   SAB IAB Ectopic Multiple Live Births   0 0 0 0 2      # Outcome Date GA Lbr Ramy/2nd Weight Sex Type Anes PTL Lv   2 Term 13 39w5d  8 lb 0.9 oz (3.654 kg) F CS-LTranv Spinal N OSWALDO   1 Term 11 39w0d  7 lb 1 oz (3.204 kg) M Caesarean EPI  OSWALDO       GYNE HISTORY:  Periods none due to hysterectomy    History   Sexual Activity    Sexual activity: Yes    Partners: Male    Birth control/ protection: Hysterectomy        Hx Prior Abnormal Pap: No  Pap Date: 18  Pap Result Notes: neg        MEDICAL HISTORY:  Past Medical History:    Allergic rhinitis    Anxiety    Anxiety state    Coronary atherosclerosis    Depression    Disorder of thyroid    Endometriosis    Hysterectomy     Heart attack (HCC)    High blood pressure    Hx of migraines    Hyperthyroidism    Hypothyroidism    Migraines    Obesity    Pre-eclampsia (HCC)    Unspecified essential hypertension    Unspecified hypothyroidism       SURGICAL HISTORY:  Past Surgical History:   Procedure Laterality Date    Abdominal surgery  2011 and 2013    Hysterectomy     Anesth,vaginal procedures  2021    Suturing of Vaginal Cuff    Angioplasty (coronary)  2023          Cabg      Cabg      Cyst removal      Hysterectomy  2021    Dr. otero total laparoscopic hysterectomy, bilateral salpingectomy, postop cuff bleeding    Other surgical history      Ovarian  cyst       SOCIAL HISTORY:  Social History     Socioeconomic History    Marital status:      Spouse name: Not on file    Number of children: Not on file    Years of education: Not on file    Highest education level: Not on file   Occupational History    Not on file   Tobacco Use    Smoking status: Never    Smokeless tobacco: Never    Tobacco comments:     none at all   Vaping Use    Vaping status: Never Used   Substance and Sexual Activity    Alcohol use: Yes     Alcohol/week: 0.0 standard drinks of alcohol     Comment: very rare    Drug use: No    Sexual activity: Yes     Partners: Male     Birth control/protection: Hysterectomy   Other Topics Concern     Service Not Asked    Blood Transfusions Not Asked    Caffeine Concern No    Occupational Exposure Not Asked    Hobby Hazards Not Asked    Sleep Concern Not Asked    Stress Concern No    Weight Concern Yes    Special Diet No    Back Care Not Asked    Exercise Yes    Bike Helmet Not Asked    Seat Belt Yes    Self-Exams Not Asked   Social History Narrative    Not on file     Social Drivers of Health     Financial Resource Strain: Low Risk  (5/11/2023)    Financial Resource Strain     Difficulty of Paying Living Expenses: Not very hard     Med Affordability: No   Food Insecurity: Not on file   Transportation Needs: No Transportation Needs (5/11/2023)    Transportation Needs     Lack of Transportation: No   Physical Activity: Not on file   Stress: Not on file   Social Connections: Not on file   Housing Stability: Not on file       FAMILY HISTORY:  Family History   Problem Relation Age of Onset    Hypertension Father     Depression Father     Other (hypothryroid) Father     Hypertension Mother     Anxiety Mother     Other (hypothyroid) Mother     Cancer Maternal Grandmother 85        Breast    OCD Sister     Other (hypothyroid) Sister        MEDICATIONS:    Current Outpatient Medications:     semaglutide-weight management 1 MG/0.5ML Subcutaneous Solution  Auto-injector, Inject 0.5 mL (1 mg total) into the skin once a week., Disp: 2 mL, Rfl:     levothyroxine 200 MCG Oral Tab, Take 1 tablet (200 mcg total) by mouth before breakfast., Disp: 90 tablet, Rfl: 3    busPIRone 15 MG Oral Tab, Take 1 tablet (15 mg total) by mouth 2 (two) times daily., Disp: 180 tablet, Rfl: 1    venlafaxine  MG Oral Capsule SR 24 Hr, Take 1 capsule (150 mg total) by mouth 2 (two) times daily., Disp: 180 capsule, Rfl: 1    ALPRAZolam 0.5 MG Oral Tab, Take 0.5 tablets (0.25 mg total) by mouth 3 (three) times daily as needed for Anxiety., Disp: 30 tablet, Rfl: 2    losartan 50 MG Oral Tab, Take 0.5 tablets (25 mg total) by mouth daily., Disp: , Rfl:     metoprolol succinate ER 25 MG Oral Tablet 24 Hr, , Disp: , Rfl:     rosuvastatin 40 MG Oral Tab, Take 1 tablet (40 mg total) by mouth daily., Disp: , Rfl:     aspirin 81 MG Oral Tab EC, Take 1 tablet (81 mg total) by mouth daily., Disp: 90 tablet, Rfl: 3    clopidogrel 75 MG Oral Tab, Take 1 tablet (75 mg total) by mouth daily., Disp: 30 tablet, Rfl: 11    ALLERGIES:  Allergies[1]      Review of Systems:  Constitutional:  Denies fatigue, night sweats, hot flashes  Gastrointestinal:  denies heartburn, abdominal pain, diarrhea or constipation  Genitourinary:  denies dysuria, incontinence, abnormal vaginal discharge, vaginal itching  Skin/Breast:  Denies any breast pain, lumps, or discharge.   Neurological:  denies headaches, extremity weakness or numbness.      PHYSICAL EXAM:   Constitutional: well developed, well nourished  Head/Face: normocephalic  Neck/Thyroid: thyroid symmetric, no thyromegaly, no nodules, no adenopathy  Breast: normal without palpable masses, tenderness, asymmetry, nipple discharge, nipple retraction or skin changes  Abdomen:  soft, nontender, nondistended, no masses  Skin/Hair: no unusual rashes or bruises   Extremities: no edema, no cyanosis  Psychiatric:  Oriented to time, place, person and situation. Appropriate mood  and affect    Pelvic Exam:  External Genitalia: normal appearance, hair distribution, and no lesions  Urethral Meatus:  normal in size, location, without lesions and prolapse  Bladder:  No fullness, masses or tenderness  Vagina:  Normal appearance without lesions, no abnormal discharge  Cervix: Absent  Uterus: Absent  Adnexa: normal without masses or tenderness without obvious masses  Perineum: normal  Anus: no hemorroids     Assessment & Plan:  Estefany was seen today for wellness visit and other.    Diagnoses and all orders for this visit:    Encounter for screening mammogram for breast cancer  -     [facility] ROSA 2D+3D SCREENING BILAT (CPT=77067/21769); Future        Vitamin D               [1]   Allergies  Allergen Reactions    Levofloxacin DIARRHEA

## 2024-12-30 ENCOUNTER — HOSPITAL ENCOUNTER (OUTPATIENT)
Dept: GENERAL RADIOLOGY | Facility: HOSPITAL | Age: 44
Discharge: HOME | End: 2024-12-30
Attending: INTERNAL MEDICINE
Payer: COMMERCIAL

## 2024-12-30 ENCOUNTER — LAB ENCOUNTER (OUTPATIENT)
Dept: LAB | Facility: HOSPITAL | Age: 44
End: 2024-12-30
Attending: INTERNAL MEDICINE
Payer: COMMERCIAL

## 2024-12-30 DIAGNOSIS — Z01.818 ENCOUNTER FOR PREOPERATIVE ASSESSMENT: ICD-10-CM

## 2024-12-30 DIAGNOSIS — Z01.818 PREOP EXAMINATION: Primary | ICD-10-CM

## 2024-12-30 LAB
ANION GAP SERPL CALC-SCNC: 5 MMOL/L (ref 0–18)
BUN BLD-MCNC: 16 MG/DL (ref 9–23)
CALCIUM BLD-MCNC: 9.9 MG/DL (ref 8.7–10.4)
CHLORIDE SERPL-SCNC: 106 MMOL/L (ref 98–112)
CO2 SERPL-SCNC: 29 MMOL/L (ref 21–32)
CREAT BLD-MCNC: 0.9 MG/DL
EGFRCR SERPLBLD CKD-EPI 2021: 82 ML/MIN/1.73M2 (ref 60–?)
ERYTHROCYTE [DISTWIDTH] IN BLOOD BY AUTOMATED COUNT: 13.2 %
FASTING STATUS PATIENT QL REPORTED: NO
GLUCOSE BLD-MCNC: 92 MG/DL (ref 70–99)
HCT VFR BLD AUTO: 45.7 %
HGB BLD-MCNC: 15.5 G/DL
MCH RBC QN AUTO: 30 PG (ref 26–34)
MCHC RBC AUTO-ENTMCNC: 33.9 G/DL (ref 31–37)
MCV RBC AUTO: 88.4 FL
OSMOLALITY SERPL CALC.SUM OF ELEC: 291 MOSM/KG (ref 275–295)
PLATELET # BLD AUTO: 245 10(3)UL (ref 150–450)
POTASSIUM SERPL-SCNC: 4.3 MMOL/L (ref 3.5–5.1)
RBC # BLD AUTO: 5.17 X10(6)UL
SODIUM SERPL-SCNC: 140 MMOL/L (ref 136–145)
WBC # BLD AUTO: 7.8 X10(3) UL (ref 4–11)

## 2024-12-30 PROCEDURE — 71046 X-RAY EXAM CHEST 2 VIEWS: CPT | Performed by: INTERNAL MEDICINE

## 2024-12-30 PROCEDURE — 85027 COMPLETE CBC AUTOMATED: CPT

## 2024-12-30 PROCEDURE — 36415 COLL VENOUS BLD VENIPUNCTURE: CPT

## 2024-12-30 PROCEDURE — 80048 BASIC METABOLIC PNL TOTAL CA: CPT

## 2024-12-31 NOTE — PAT NURSING NOTE
PreOp Instructions     You are scheduled for: a Cardiac Procedure     Date of Procedure: 01/03/25     Diet Instructions: Do not eat or drink anything after midnight including gum, mints, candy, etc the night before your procedure.     Medications: Medications you are allowed to take can be taken with a sip of water the morning of your procedure.  Be sure to take your Aspirin and Plavix before coming in for your procedure.     Medications to Stop: Do not take any herbal supplements and vitamins the morning of your procedure.     Skin Prep : Shower with antibacterial soap using a clean washcloth, prior to procedure. Once dried off, no lotions/powders/creams/ointments, etc.     Arrival Time: The day prior to your procedure you will receive a phone call between 3:00 pm- 6:00 pm with your arrival time. If you haven't received a phone call, please check your voicemail messages., If you did not receive a voice mail and it is after 6:00 pm, please call the nursing supervisor at [phone].    Driving After Procedure: Sedation will be given so you WILL NOT be able to drive home. You will need a responsible adult  to drive you home. You can NOT take uber or taxi unless approved by your physician in advance.     Discharge Teaching: Your nurse will give you specific instructions before discharge, Most people can resume normal activities in 2-3 days, Any questions, please call the physician's office

## 2025-01-03 ENCOUNTER — HOSPITAL ENCOUNTER (OUTPATIENT)
Dept: INTERVENTIONAL RADIOLOGY/VASCULAR | Facility: HOSPITAL | Age: 45
Discharge: HOME | End: 2025-01-03
Attending: INTERNAL MEDICINE | Admitting: INTERNAL MEDICINE
Payer: COMMERCIAL

## 2025-01-03 VITALS
SYSTOLIC BLOOD PRESSURE: 125 MMHG | DIASTOLIC BLOOD PRESSURE: 88 MMHG | BODY MASS INDEX: 39.55 KG/M2 | RESPIRATION RATE: 21 BRPM | HEART RATE: 104 BPM | HEIGHT: 67 IN | TEMPERATURE: 97 F | WEIGHT: 252 LBS | OXYGEN SATURATION: 98 %

## 2025-01-03 DIAGNOSIS — I25.10 CAD (CORONARY ARTERY DISEASE): ICD-10-CM

## 2025-01-03 DIAGNOSIS — R07.9 CHEST PAIN: ICD-10-CM

## 2025-01-03 PROCEDURE — 4A023N7 MEASUREMENT OF CARDIAC SAMPLING AND PRESSURE, LEFT HEART, PERCUTANEOUS APPROACH: ICD-10-PCS | Performed by: INTERNAL MEDICINE

## 2025-01-03 PROCEDURE — 99152 MOD SED SAME PHYS/QHP 5/>YRS: CPT | Performed by: INTERNAL MEDICINE

## 2025-01-03 PROCEDURE — B2131ZZ FLUOROSCOPY OF MULTIPLE CORONARY ARTERY BYPASS GRAFTS USING LOW OSMOLAR CONTRAST: ICD-10-PCS | Performed by: INTERNAL MEDICINE

## 2025-01-03 PROCEDURE — 93458 L HRT ARTERY/VENTRICLE ANGIO: CPT | Performed by: INTERNAL MEDICINE

## 2025-01-03 PROCEDURE — 93459 L HRT ART/GRFT ANGIO: CPT | Performed by: INTERNAL MEDICINE

## 2025-01-03 PROCEDURE — B2151ZZ FLUOROSCOPY OF LEFT HEART USING LOW OSMOLAR CONTRAST: ICD-10-PCS | Performed by: INTERNAL MEDICINE

## 2025-01-03 RX ORDER — MIDAZOLAM HYDROCHLORIDE 1 MG/ML
INJECTION INTRAMUSCULAR; INTRAVENOUS
Status: COMPLETED
Start: 2025-01-03 | End: 2025-01-03

## 2025-01-03 RX ORDER — HEPARIN SODIUM 5000 [USP'U]/ML
INJECTION, SOLUTION INTRAVENOUS; SUBCUTANEOUS
Status: COMPLETED
Start: 2025-01-03 | End: 2025-01-03

## 2025-01-03 RX ORDER — SODIUM CHLORIDE 9 MG/ML
INJECTION, SOLUTION INTRAVENOUS
Status: DISCONTINUED | OUTPATIENT
Start: 2025-01-04 | End: 2025-01-03 | Stop reason: HOSPADM

## 2025-01-03 RX ORDER — LIDOCAINE HYDROCHLORIDE 10 MG/ML
INJECTION, SOLUTION EPIDURAL; INFILTRATION; INTRACAUDAL; PERINEURAL
Status: COMPLETED
Start: 2025-01-03 | End: 2025-01-03

## 2025-01-03 NOTE — PROGRESS NOTES
Pt s/p [facility] with Dr. Samano. Pt recovered in holding. Pt  at bedside. Right groin dressing cdi, soft. +pedals. Pt flat for one hour then HOB elevated. Pt ate and drank. Discharge instructions reviewed with pt and  and copy given. After recovery pt out of bed to bathroom and harden with no complaints. Right groin dressing remained cdi, soft. IV removed and pt dressed. Pt discharged to [facility] via wheelchair by volunteer. Pt left with belongings. Pt  drove pt home.

## 2025-01-09 NOTE — PROCEDURES
[facility]    PATIENT'S NAME: FRANCE SALOMON   ATTENDING PHYSICIAN: Terrence Samano M.D.   OPERATING PHYSICIAN: Terrence Samano M.D.   PATIENT ACCOUNT#:   [id number]    LOCATION:  [address]  MEDICAL RECORD #:   [id number]       YOB: 1980  ADMISSION DATE:       01/03/2025      OPERATION DATE:  01/03/2025    CARDIAC PROCEDURE TRANSCRIPTION    CARDIAC CATHETERIZATION     PREOPERATIVE DIAGNOSIS:    POSTOPERATIVE DIAGNOSIS:    PROCEDURE PERFORMED:      INDICATION:  This patient initially had coronary stenting for progression of disease and underwent bypass surgery despite her young age.  She now feels she has recurrence of symptoms.    DESCRIPTION OF PROCEDURE:  The right groin was anesthetized, and a 6-[place] sheath was introduced.    Left ventriculogram revealed an ejection fraction of 45% with mild anterior hypokinesis.    The stent to the LAD is widely patent.  There is some narrowing at the proximal LAD.  This is about 70%.  It is eccentric, and mostly it looked reasonably patent.  Circumflex is patent.    Right coronary artery is totally occluded.      Vein graft to the right is widely patent.    Vein graft to the obtuse marginal is widely patent.    LIMA to the LAD is patent.    Dictated By Terrence Samano M.D.  d: 01/08/2025 12:35:15  t: 01/08/2025 18:59:22  [facility] [id number]  [facility] [place]/

## 2025-01-13 ENCOUNTER — TELEPHONE (OUTPATIENT)
Dept: FAMILY MEDICINE CLINIC | Facility: CLINIC | Age: 45
End: 2025-01-13

## 2025-01-13 ENCOUNTER — PATIENT MESSAGE (OUTPATIENT)
Dept: FAMILY MEDICINE CLINIC | Facility: CLINIC | Age: 45
End: 2025-01-13

## 2025-01-13 DIAGNOSIS — I25.10 ASCVD (ARTERIOSCLEROTIC CARDIOVASCULAR DISEASE): ICD-10-CM

## 2025-01-13 DIAGNOSIS — E78.2 MIXED HYPERLIPIDEMIA: Primary | ICD-10-CM

## 2025-01-13 NOTE — TELEPHONE ENCOUNTER
[facility]  Referral request for biological infusion services.    Put in referral folder      slipping, stumbling. tripping

## 2025-01-13 NOTE — TELEPHONE ENCOUNTER
Spoke with patient she states she does have a new insurance card.Instructed patient to take picture of front and back of card and send via my-chart message.  Will hold for new insurance card

## 2025-01-18 ENCOUNTER — HOSPITAL ENCOUNTER (OUTPATIENT)
Dept: MAMMOGRAPHY | Age: 45
Discharge: HOME | End: 2025-01-18
Attending: PHYSICIAN ASSISTANT
Payer: COMMERCIAL

## 2025-01-18 DIAGNOSIS — Z12.31 VISIT FOR SCREENING MAMMOGRAM: ICD-10-CM

## 2025-01-18 PROCEDURE — 77063 BREAST TOMOSYNTHESIS BI: CPT | Performed by: PHYSICIAN ASSISTANT

## 2025-01-18 PROCEDURE — 77067 SCR MAMMO BI INCL CAD: CPT | Performed by: PHYSICIAN ASSISTANT

## 2025-01-22 ENCOUNTER — TELEPHONE (OUTPATIENT)
Dept: ADMINISTRATIVE | Age: 45
End: 2025-01-22

## 2025-01-22 DIAGNOSIS — R07.9 CHEST PAIN, UNSPECIFIED TYPE: ICD-10-CM

## 2025-01-22 DIAGNOSIS — Z09 FOLLOW-UP EXAM, 3-6 MONTHS SINCE PREVIOUS EXAM: Primary | ICD-10-CM

## 2025-01-22 NOTE — TELEPHONE ENCOUNTER
Patient request Urgent  referral to see Cardiology Terrence Samano.please review and sign plan and care if you agree Thank you.    Kia CHIN  [facility].

## 2025-01-26 ENCOUNTER — PATIENT MESSAGE (OUTPATIENT)
Dept: FAMILY MEDICINE CLINIC | Facility: CLINIC | Age: 45
End: 2025-01-26

## 2025-01-26 DIAGNOSIS — R73.01 IMPAIRED FASTING GLUCOSE: ICD-10-CM

## 2025-01-26 DIAGNOSIS — E88.810 ABDOMINAL OBESITY AND METABOLIC SYNDROME: ICD-10-CM

## 2025-01-26 DIAGNOSIS — E66.813 CLASS 3 SEVERE OBESITY WITH SERIOUS COMORBIDITY AND BODY MASS INDEX (BMI) OF 40.0 TO 44.9 IN ADULT, UNSPECIFIED OBESITY TYPE (HCC): ICD-10-CM

## 2025-01-26 DIAGNOSIS — E66.9 ABDOMINAL OBESITY AND METABOLIC SYNDROME: ICD-10-CM

## 2025-01-26 DIAGNOSIS — E66.01 CLASS 3 SEVERE OBESITY WITH SERIOUS COMORBIDITY AND BODY MASS INDEX (BMI) OF 40.0 TO 44.9 IN ADULT, UNSPECIFIED OBESITY TYPE (HCC): ICD-10-CM

## 2025-02-21 ENCOUNTER — TELEPHONE (OUTPATIENT)
Age: 45
End: 2025-02-21

## 2025-02-24 ENCOUNTER — TELEPHONE (OUTPATIENT)
Age: 45
End: 2025-02-24

## 2025-02-27 ENCOUNTER — OFFICE VISIT (OUTPATIENT)
Age: 45
End: 2025-02-27
Attending: INTERNAL MEDICINE
Payer: COMMERCIAL

## 2025-02-27 DIAGNOSIS — E78.2 MIXED HYPERLIPIDEMIA: ICD-10-CM

## 2025-02-27 DIAGNOSIS — I25.10 CAD, MULTIPLE VESSEL: Primary | ICD-10-CM

## 2025-03-13 ENCOUNTER — PATIENT MESSAGE (OUTPATIENT)
Dept: FAMILY MEDICINE CLINIC | Facility: CLINIC | Age: 45
End: 2025-03-13

## 2025-03-13 DIAGNOSIS — E88.810 ABDOMINAL OBESITY AND METABOLIC SYNDROME: ICD-10-CM

## 2025-03-13 DIAGNOSIS — E66.813 CLASS 3 SEVERE OBESITY WITH SERIOUS COMORBIDITY AND BODY MASS INDEX (BMI) OF 40.0 TO 44.9 IN ADULT, UNSPECIFIED OBESITY TYPE (HCC): ICD-10-CM

## 2025-03-13 DIAGNOSIS — E66.01 CLASS 3 SEVERE OBESITY WITH SERIOUS COMORBIDITY AND BODY MASS INDEX (BMI) OF 40.0 TO 44.9 IN ADULT, UNSPECIFIED OBESITY TYPE (HCC): ICD-10-CM

## 2025-03-13 DIAGNOSIS — R73.01 IMPAIRED FASTING GLUCOSE: ICD-10-CM

## 2025-03-13 DIAGNOSIS — E66.9 ABDOMINAL OBESITY AND METABOLIC SYNDROME: ICD-10-CM

## 2025-03-13 NOTE — TELEPHONE ENCOUNTER
Requesting   Requested Prescriptions     Pending Prescriptions Disp Refills    semaglutide-weight management 1 MG/0.5ML Subcutaneous Solution Auto-injector 6 mL 0     Sig: Inject 0.5 mL (1 mg total) into the skin once a week.       LOV: 11/25/24    Filled: 1/28/25 #6ml with 0 refills    Future Appointments   Date Time Provider Department Center   5/22/2025  3:45 PM ELEAZAR [place] RN1 ELEAZAR BURTON [facility]

## 2025-04-03 ENCOUNTER — TELEMEDICINE (OUTPATIENT)
Dept: FAMILY MEDICINE CLINIC | Facility: CLINIC | Age: 45
End: 2025-04-03
Payer: COMMERCIAL

## 2025-04-03 DIAGNOSIS — E66.01 CLASS 3 SEVERE OBESITY WITH SERIOUS COMORBIDITY AND BODY MASS INDEX (BMI) OF 40.0 TO 44.9 IN ADULT, UNSPECIFIED OBESITY TYPE (HCC): Primary | ICD-10-CM

## 2025-04-03 DIAGNOSIS — R73.01 IMPAIRED FASTING GLUCOSE: ICD-10-CM

## 2025-04-03 DIAGNOSIS — E66.813 CLASS 3 SEVERE OBESITY WITH SERIOUS COMORBIDITY AND BODY MASS INDEX (BMI) OF 40.0 TO 44.9 IN ADULT, UNSPECIFIED OBESITY TYPE (HCC): Primary | ICD-10-CM

## 2025-04-03 PROCEDURE — 98005 SYNCH AUDIO-VIDEO EST LOW 20: CPT | Performed by: PHYSICIAN ASSISTANT

## 2025-04-03 RX ORDER — TIRZEPATIDE 10 MG/.5ML
10 INJECTION, SOLUTION SUBCUTANEOUS WEEKLY
Qty: 2 ML | Refills: 5 | Status: SHIPPED | OUTPATIENT
Start: 2025-04-03

## 2025-04-03 NOTE — PROGRESS NOTES
Estefany Retana is a 44 year old female.  No chief complaint on file.      HPI:   Patient presents to discuss ongoing weight management  She is on compounded semaglutide max dose  She is at a plateau with progress  Also will be unable to get it as compounded GLP-1 injections soon to be discontinued  Her  is doing great on Zepbound through [employer]  She wonders about taking this but concerned for safety given her cardiac history  She is still maintaining a healthy diet and regular activity      Current Outpatient Medications   Medication Sig Dispense Refill    Tirzepatide-Weight Management (ZEPBOUND) 10 MG/0.5ML Subcutaneous Solution Inject 10 mg into the skin once a week. 2 mL 5    levothyroxine 200 MCG Oral Tab Take 1 tablet (200 mcg total) by mouth before breakfast. 90 tablet 3    busPIRone 15 MG Oral Tab Take 1 tablet (15 mg total) by mouth 2 (two) times daily. 180 tablet 1    venlafaxine  MG Oral Capsule SR 24 Hr Take 1 capsule (150 mg total) by mouth 2 (two) times daily. 180 capsule 1    ALPRAZolam 0.5 MG Oral Tab Take 0.5 tablets (0.25 mg total) by mouth 3 (three) times daily as needed for Anxiety. 30 tablet 2    losartan 50 MG Oral Tab Take 0.5 tablets (25 mg total) by mouth daily.      metoprolol succinate ER 25 MG Oral Tablet 24 Hr 1 tablet (25 mg total) daily.      rosuvastatin 40 MG Oral Tab Take 1 tablet (40 mg total) by mouth daily.      aspirin 81 MG Oral Tab EC Take 1 tablet (81 mg total) by mouth daily. 90 tablet 3    clopidogrel 75 MG Oral Tab Take 1 tablet (75 mg total) by mouth daily. 30 tablet 11      Past Medical History:    Allergic rhinitis    Anxiety    Anxiety state    Coronary atherosclerosis    Depression    Disorder of thyroid    Endometriosis    Hysterectomy 2021    Heart attack (HCC)    High blood pressure    High cholesterol    Hx of migraines    Hyperthyroidism    Hypothyroidism    Migraines    Obesity    Pre-eclampsia (HCC)    Unspecified essential hypertension     Unspecified hypothyroidism      Past Surgical History:   Procedure Laterality Date    Abdominal surgery  2011 and 2013    Hysterectomy     Anesth,vaginal procedures  2021    Suturing of Vaginal Cuff    Angioplasty (coronary)  2023          Cabg      Cabg      Cyst removal      Hysterectomy  2021    Dr. otero total laparoscopic hysterectomy, bilateral salpingectomy, postop cuff bleeding    Other surgical history      Ovarian cyst      Social History:  Social History     Socioeconomic History    Marital status:    Tobacco Use    Smoking status: Never    Smokeless tobacco: Never    Tobacco comments:     none at all   Vaping Use    Vaping status: Never Used   Substance and Sexual Activity    Alcohol use: Not Currently     Comment: very rare    Drug use: No    Sexual activity: Yes     Partners: Male     Birth control/protection: Hysterectomy   Other Topics Concern    Caffeine Concern No    Stress Concern No    Weight Concern Yes    Special Diet No    Exercise Yes    Seat Belt Yes     Social Drivers of Health     Transportation Needs: No Transportation Needs (2023)    Transportation Needs     Lack of Transportation: No        REVIEW OF SYSTEMS:   GENERAL HEALTH: Denies fevers, chills, sweats, and fatigue.  RESPIRATORY: Denies shortness of breath, wheezing, and cough.  CARDIOVASCULAR: Denies chest pain, palpitations, and edema.  GI: denies abdominal pain; denies constipation  NEURO: Denies numbness/tingling, weakness, and headaches  SKIN: Denies rash  PSYCH: Denies anxiety and depression    EXAM:   LMP 2021 (Approximate)   GENERAL: well developed, well nourished, NAD.  HEENT: AT/NC. Normal lips, tongue, gums, teeth  LUNGS: Breathing comfortably, speaking in clear sentences without increased work of breathing,.  CARDIO: No cyanosis or edema  NUERO: Normal mood and affect.    PSYCH: A&Ox3, normal mood and affect    ASSESSMENT AND PLAN:   1. Class 3 severe  obesity with serious comorbidity and body mass index (BMI) of 40.0 to 44.9 in adult, unspecified obesity type (HCC)  2. Impaired fasting glucose  Switch from compounded semaglutide to zepbound vials through [facility]. Start at dose of 10 mg weekly and follow up in 4 weeks with condition update. Sooner prn. Continue with healthy diet and regular exercise. Complete labs.   - Tirzepatide-Weight Management (ZEPBOUND) 10 MG/0.5ML Subcutaneous Solution; Inject 10 mg into the skin once a week.  Dispense: 2 mL; Refill: 5        The patient indicates understanding of these issues and agrees to the plan.    No follow-ups on file.      Irma Goyal PA-C  4/3/2025  7:57 AM              Telehealth outside of [facility]  Telehealth Verbal Consent   I conducted a telehealth visit with Estefany Retana today, 04/03/25, which was completed using two-way, real-time interactive audio and video communication. This has been done in good bere to provide continuity of care in the best interest of the provider-patient relationship, due to the COVID -19 public health crisis/national emergency where restrictions of face-to-face office visits are ongoing. Every conscious effort was taken to allow for sufficient and adequate time to complete the visit.  The patient was made aware of the limitations of the telehealth visit, including treatment limitations as no physical exam could be performed.  The patient was advised to call 911 or to go to the ER in case there was an emergency.  The patient was also advised of the potential privacy & security concerns related to the telehealth platform.   The patient was made aware of where to find [facility]'s notice of privacy practices, telehealth consent form and other related consent forms and documents.  which are located on the [facility] website. The patient verbally agreed to telehealth consent form, related consents and the risks discussed.    Lastly, the patient confirmed that they were  in [place].   Included in this visit, time may have been spent reviewing labs, medications, radiology tests and decision making. Appropriate medical decision-making and tests are ordered as detailed in the plan of care above.  Coding/billing information is submitted for this visit based on complexity of care and/or time spent for the visit.

## 2025-04-09 DIAGNOSIS — F41.1 GAD (GENERALIZED ANXIETY DISORDER): ICD-10-CM

## 2025-04-09 DIAGNOSIS — F33.2 SEVERE RECURRENT MAJOR DEPRESSION WITHOUT PSYCHOTIC FEATURES (HCC): ICD-10-CM

## 2025-04-10 ENCOUNTER — HOSPITAL ENCOUNTER (EMERGENCY)
Facility: HOSPITAL | Age: 45
Discharge: HOME | End: 2025-04-10
Attending: EMERGENCY MEDICINE
Payer: COMMERCIAL

## 2025-04-10 ENCOUNTER — APPOINTMENT (OUTPATIENT)
Dept: GENERAL RADIOLOGY | Facility: HOSPITAL | Age: 45
End: 2025-04-10
Attending: EMERGENCY MEDICINE
Payer: COMMERCIAL

## 2025-04-10 VITALS
HEART RATE: 95 BPM | DIASTOLIC BLOOD PRESSURE: 80 MMHG | SYSTOLIC BLOOD PRESSURE: 112 MMHG | RESPIRATION RATE: 19 BRPM | TEMPERATURE: 97 F | OXYGEN SATURATION: 100 % | BODY MASS INDEX: 37.67 KG/M2 | WEIGHT: 240 LBS | HEIGHT: 67 IN

## 2025-04-10 DIAGNOSIS — R07.9 CHEST PAIN OF UNCERTAIN ETIOLOGY: Primary | ICD-10-CM

## 2025-04-10 DIAGNOSIS — R09.1 PLEURISY: ICD-10-CM

## 2025-04-10 LAB
ALBUMIN SERPL-MCNC: 5.1 G/DL (ref 3.2–4.8)
ALBUMIN/GLOB SERPL: 2.3 {RATIO} (ref 1–2)
ALP LIVER SERPL-CCNC: 89 U/L (ref 37–98)
ALT SERPL-CCNC: 23 U/L (ref 10–49)
ANION GAP SERPL CALC-SCNC: 9 MMOL/L (ref 0–18)
AST SERPL-CCNC: 18 U/L (ref ?–34)
ATRIAL RATE: 103 BPM
ATRIAL RATE: 104 BPM
BASOPHILS # BLD AUTO: 0.01 X10(3) UL (ref 0–0.2)
BASOPHILS NFR BLD AUTO: 0.1 %
BILIRUB SERPL-MCNC: 0.6 MG/DL (ref 0.3–1.2)
BUN BLD-MCNC: 13 MG/DL (ref 9–23)
CALCIUM BLD-MCNC: 10.3 MG/DL (ref 8.7–10.6)
CHLORIDE SERPL-SCNC: 104 MMOL/L (ref 98–112)
CO2 SERPL-SCNC: 27 MMOL/L (ref 21–32)
CREAT BLD-MCNC: 0.93 MG/DL (ref 0.55–1.02)
D DIMER PPP FEU-MCNC: 0.37 UG/ML FEU (ref ?–0.5)
EGFRCR SERPLBLD CKD-EPI 2021: 79 ML/MIN/1.73M2 (ref 60–?)
EOSINOPHIL # BLD AUTO: 0 X10(3) UL (ref 0–0.7)
EOSINOPHIL NFR BLD AUTO: 0 %
ERYTHROCYTE [DISTWIDTH] IN BLOOD BY AUTOMATED COUNT: 13.2 %
FLUAV + FLUBV RNA SPEC NAA+PROBE: NEGATIVE
FLUAV + FLUBV RNA SPEC NAA+PROBE: NEGATIVE
GLOBULIN PLAS-MCNC: 2.2 G/DL (ref 2–3.5)
GLUCOSE BLD-MCNC: 98 MG/DL (ref 70–99)
HCT VFR BLD AUTO: 42.4 % (ref 35–48)
HGB BLD-MCNC: 13.9 G/DL (ref 12–16)
IMM GRANULOCYTES # BLD AUTO: 0.03 X10(3) UL (ref 0–1)
IMM GRANULOCYTES NFR BLD: 0.3 %
LYMPHOCYTES # BLD AUTO: 1.17 X10(3) UL (ref 1–4)
LYMPHOCYTES NFR BLD AUTO: 12.2 %
MCH RBC QN AUTO: 29.6 PG (ref 26–34)
MCHC RBC AUTO-ENTMCNC: 32.8 G/DL (ref 31–37)
MCV RBC AUTO: 90.4 FL (ref 80–100)
MONOCYTES # BLD AUTO: 0.91 X10(3) UL (ref 0.1–1)
MONOCYTES NFR BLD AUTO: 9.5 %
NEUTROPHILS # BLD AUTO: 7.48 X10 (3) UL (ref 1.5–7.7)
NEUTROPHILS # BLD AUTO: 7.48 X10(3) UL (ref 1.5–7.7)
NEUTROPHILS NFR BLD AUTO: 77.9 %
OSMOLALITY SERPL CALC.SUM OF ELEC: 290 MOSM/KG (ref 275–295)
P AXIS: 56 DEGREES
P AXIS: 56 DEGREES
P-R INTERVAL: 150 MS
P-R INTERVAL: 160 MS
PLATELET # BLD AUTO: 210 10(3)UL (ref 150–450)
POTASSIUM SERPL-SCNC: 3.9 MMOL/L (ref 3.5–5.1)
PROT SERPL-MCNC: 7.3 G/DL (ref 5.7–8.2)
Q-T INTERVAL: 358 MS
Q-T INTERVAL: 362 MS
QRS DURATION: 92 MS
QRS DURATION: 94 MS
QTC CALCULATION (BEZET): 470 MS
QTC CALCULATION (BEZET): 474 MS
R AXIS: 33 DEGREES
R AXIS: 36 DEGREES
RBC # BLD AUTO: 4.69 X10(6)UL (ref 3.8–5.3)
RSV RNA SPEC NAA+PROBE: NEGATIVE
SARS-COV-2 RNA RESP QL NAA+PROBE: NOT DETECTED
SODIUM SERPL-SCNC: 140 MMOL/L (ref 136–145)
T AXIS: 41 DEGREES
T AXIS: 50 DEGREES
TROPONIN I SERPL HS-MCNC: 4 NG/L (ref ?–34)
TROPONIN I SERPL HS-MCNC: <3 NG/L (ref ?–34)
VENTRICULAR RATE: 103 BPM
VENTRICULAR RATE: 104 BPM
WBC # BLD AUTO: 9.6 X10(3) UL (ref 4–11)

## 2025-04-10 PROCEDURE — 93005 ELECTROCARDIOGRAM TRACING: CPT

## 2025-04-10 PROCEDURE — 80053 COMPREHEN METABOLIC PANEL: CPT | Performed by: EMERGENCY MEDICINE

## 2025-04-10 PROCEDURE — 93010 ELECTROCARDIOGRAM REPORT: CPT

## 2025-04-10 PROCEDURE — 96374 THER/PROPH/DIAG INJ IV PUSH: CPT

## 2025-04-10 PROCEDURE — 84484 ASSAY OF TROPONIN QUANT: CPT | Performed by: EMERGENCY MEDICINE

## 2025-04-10 PROCEDURE — 99285 EMERGENCY DEPT VISIT HI MDM: CPT

## 2025-04-10 PROCEDURE — 71045 X-RAY EXAM CHEST 1 VIEW: CPT | Performed by: EMERGENCY MEDICINE

## 2025-04-10 PROCEDURE — 85379 FIBRIN DEGRADATION QUANT: CPT | Performed by: EMERGENCY MEDICINE

## 2025-04-10 PROCEDURE — 0241U SARS-COV-2/FLU A AND B/RSV BY PCR (GENEXPERT): CPT | Performed by: EMERGENCY MEDICINE

## 2025-04-10 PROCEDURE — 85025 COMPLETE CBC W/AUTO DIFF WBC: CPT | Performed by: EMERGENCY MEDICINE

## 2025-04-10 RX ORDER — BUSPIRONE HYDROCHLORIDE 15 MG/1
15 TABLET ORAL 2 TIMES DAILY
Qty: 180 TABLET | Refills: 1 | Status: SHIPPED | OUTPATIENT
Start: 2025-04-10

## 2025-04-10 RX ORDER — KETOROLAC TROMETHAMINE 15 MG/ML
15 INJECTION, SOLUTION INTRAMUSCULAR; INTRAVENOUS ONCE
Status: COMPLETED | OUTPATIENT
Start: 2025-04-10 | End: 2025-04-10

## 2025-04-10 RX ORDER — VENLAFAXINE HYDROCHLORIDE 150 MG/1
150 CAPSULE, EXTENDED RELEASE ORAL 2 TIMES DAILY
Qty: 180 CAPSULE | Refills: 1 | Status: SHIPPED | OUTPATIENT
Start: 2025-04-10

## 2025-04-10 NOTE — TELEPHONE ENCOUNTER
A refill request was received for:  Requested Prescriptions     Pending Prescriptions Disp Refills    busPIRone 15 MG Oral Tab 180 tablet 1     Sig: Take 1 tablet (15 mg total) by mouth 2 (two) times daily.    venlafaxine  MG Oral Capsule SR 24 Hr 180 capsule 1     Sig: Take 1 capsule (150 mg total) by mouth 2 (two) times daily.       Last refill date:10/2024       Last office visit:11/2024 annual  12/2024 weight      Follow up due:  Future Appointments   Date Time Provider Department Center   5/22/2025  3:45 PM ELEAZAR [place] RN1 PF [facility]

## 2025-04-10 NOTE — ED QUICK NOTES
Pt c/o left side pain under left breast that started last night. Pt sts pain increases with deep breath and does not feel similar t previous MI. No c/o n/v. Pt does report some sweating today which prompted her to come to ER.

## 2025-04-10 NOTE — DISCHARGE INSTRUCTIONS
Follow-up with your primary care physician, cardiologist see tomorrow.  If you have any recurrent chest pain or shortness of breath return to the emergency room.    You were seen in the emergency room in a limited time.  There is a possibility that although we do not see any acute process at this present time that things can change with time.  Is therefore imperative that you follow-up with primary care physician for close follow-up.  If there is any significant progression of your pain  or other symptoms you to return immediately to the emergency room.

## 2025-04-10 NOTE — CONSULTS
[facility]  Report of Consultation    Estefany Retana Patient Status:  Emergency    1980 MRN [id number]   [facility] EMERGENCY DEPARTMENT Attending Ozzie Martinez MD   Hosp Day # 0 PCP Margie Thornton DO     Reason for Consultation:  ***    History of Present Illness:  Estefany Retana is a a(n) 44 year old female. ***    History:  Past Medical History[1]  Past Surgical History[2]  Family History[3]   reports that she has never smoked. She has never used smokeless tobacco. She reports that she does not currently use alcohol. She reports that she does not use drugs.    Allergies:  Allergies[4]    Medications:  Current Hospital Medications[5]    Review of Systems:  All systems were reviewed and are negative except as described above in HPI.    Physical Exam:  Blood pressure 112/80, pulse 95, temperature 96.9 °F (36.1 °C), temperature source Tympanic, resp. rate 19, height 5' 7\" (1.702 m), weight 240 lb (108.9 kg), last menstrual period 2021, SpO2 100%, not currently breastfeeding.  Temp (24hrs), Av.9 °F (36.1 °C), Min:96.9 °F (36.1 °C), Max:96.9 °F (36.1 °C)    Wt Readings from Last 3 Encounters:   04/10/25 240 lb (108.9 kg)   24 252 lb (114.3 kg)   24 252 lb 3.2 oz (114.4 kg)       Telemetry: ***  General: Alert and oriented in no apparent distress.  HEENT: No focal deficits.  Neck: No JVD, carotids 2+ no bruits.  Cardiac: Regular rate and rhythm, S1, S2 normal, no murmur, rub or gallop.  Lungs: Clear without wheezes, rales, rhonchi or dullness.  Normal excursions and effort.  Abdomen: Soft, non-tender.   Extremities: Without clubbing, cyanosis or edema.  Peripheral pulses are 2+.  Neurologic: Alert and oriented, normal affect.  Skin: Warm and dry.     Laboratories and Data:  Diagnostics:  EKG: ***  Echo: ***  Stress Test: ***  Cath: 25-   Left ventriculogram revealed an ejection fraction of 45% with mild anterior hypokinesis.     The stent to the LAD is  widely patent.  There is some narrowing at the proximal LAD.  This is about 70%.  It is eccentric, and mostly it looked reasonably patent.  Circumflex is patent.     Right coronary artery is totally occluded.       Vein graft to the right is widely patent.     Vein graft to the obtuse marginal is widely patent.     LIMA to the LAD is patent.    CXR: ***    Labs:   Lab Results   Component Value Date    WBC 9.6 04/10/2025    RBC 4.69 04/10/2025    HGB 13.9 04/10/2025    HCT 42.4 04/10/2025    MCV 90.4 04/10/2025    MCH 29.6 04/10/2025    MCHC 32.8 04/10/2025    RDW 13.2 04/10/2025    .0 04/10/2025     Lab Results   Component Value Date    INR 0.94 2023    INR 0.99 2023    INR 1.27 (H) 2023       Assessment/Plan:  Problem List[6]    ***    LIZZ Torres  4/10/2025  2:19 PM           [1]   Past Medical History:   Allergic rhinitis    Anxiety    Anxiety state    Coronary atherosclerosis    Depression    Disorder of thyroid    Endometriosis    Hysterectomy     Heart attack (HCC)    High blood pressure    High cholesterol    Hx of migraines    Hyperthyroidism    Hypothyroidism    Migraines    Obesity    Pre-eclampsia (HCC)    Unspecified essential hypertension    Unspecified hypothyroidism   [2]   Past Surgical History:  Procedure Laterality Date    Abdominal surgery  2011 and 2013    Hysterectomy     Anesth,vaginal procedures  2021    Suturing of Vaginal Cuff    Angioplasty (coronary)  2023          Cabg      Cabg      Cyst removal      Heart surgery      Hysterectomy  2021    Dr. otero total laparoscopic hysterectomy, bilateral salpingectomy, postop cuff bleeding    Other surgical history      Ovarian cyst   [3]   Family History  Problem Relation Age of Onset    Hypertension Father     Depression Father     Other (hypothryroid) Father     Hypertension Mother     Anxiety Mother     Other (hypothyroid) Mother     Cancer Maternal Grandmother  85        Breast    OCD Sister     Other (hypothyroid) Sister    [4]   Allergies  Allergen Reactions    Levofloxacin DIARRHEA   [5] No current facility-administered medications for this encounter.  [6]   Patient Active Problem List  Diagnosis    Severe recurrent major depression without psychotic features ([facility])    MICHAEL (generalized anxiety disorder)    Allergic rhinitis    Hypovitaminosis D    Therapeutic drug monitoring    Benign essential HTN    Status post hysterectomy    CAD, multiple vessel    Mixed hyperlipidemia    Presence of drug coated stent in anterior descending branch of left coronary artery    Presence of drug coated stent in left circumflex coronary artery    S/P CABG x 3    Class 3 severe obesity with serious comorbidity and body mass index (BMI) of 40.0 to 44.9 in adult ([facility])    Presence of aortocoronary bypass graft

## 2025-04-10 NOTE — HISTORICAL OFFICE NOTE
[facility]ility [facility]  [address] [place] [address], [address] [place] [address]  [phone]      Estefany Retana  Progress Note  Demographics:  Name: Estefany Retana YOB: 1980  Age: 44, Female Medical Record No: [id number]  Visited Date/Time: 01/23/2025 03:30 PM    Chief Complaints  Follow up s/p cath 1/3  History of Present Illness  Patient with PVCs, prior sinus ventral bypass surgery.  As she is having lots of aching on Crestor.  She recently had a cardiac catheterization because she thought she was having recurrence of her symptoms.  Cath showed good LV function with ejection action 45% patent vein graft to the right patent LIMA to the LAD and patent vein graft to the obtuse marginal.  Cardiac risk factors Hypertension, Dyslipidemia, Obesity and Never smoked  Past Medical History  1.Encounter for preoperative assessment  2.Chest pain, ischemic - CP  3.Fatigue  4.PVCs (premature ventricular contractions)  5.Presence of drug coated stent in anterior descending branch of left coronary artery  6.Presence of drug coated stent in left circumflex coronary artery  7.S/P CABG x 3  8.Palpitations  9.Dyspnea, unspecified  10.Sternum pain  11.Catheterization, heart, left  12.Hypothyroid  13.Severe recurrent major depression without psychotic features  14.MICHAEL (generalized anxiety disorder)  15.Allergic rhinitis  16.Hypovitaminosis D  17.Benign essential HTN  18.Status post hysterectomy  19.CAD, multiple vessel  20.Mixed hyperlipidemia  21.Hyperglycemia  Past Surgical History  1.Presence of drug coated stent in anterior descending branch of left coronary artery  2.Presence of drug coated stent in left circumflex coronary artery  3.S/P CABG x 3  4.Catheterization, heart, left  5.Vaginal bleeding  Family History  1. Father - Hypertension (HTN), primary  2. Mother - Hypertension (HTN), primary  3. Sister - Hypothyroidism  Social History  Smoking status Never smoked  Tobacco usage - No (Non-smoker  (finding))  Review of systems  Cardiovascular No history of Chest pain, AUGUSTE, Palpitations, Syncope, PND, Orthopnea, Edema and Claudication  Respiratory No history of SOB  Physical Examination  Vitals Right Arm Sitting  / 76 mmHg, Pulse rate 96 bpm, Regular, Height in 5' 7\", BMI: 39.3, Weight in 251 lbs (or) 113.85 kgs and BSA : 2.37 cc/m²  Head/Eyes/Ears/Nose/Mouth/Throat Conjunctiva pink and Mucous membranes Moist  Neck Normal carotid pulsations, No carotid bruits and No JVD  Respiratory Unlabored and Lungs clear with normal breath sounds  Cardiovascular Intact distal pulses and Regular rhythm. Normal rate present. Normal and normal S1 and S2    Gastrointestinal Abdomen soft and Non-tender  Musculoskeletal Normal spine  Lower Extremities Pulses 2+ and equal bilaterally and No edema  Skin Warm and dry and No rashes or lesions  Neurologic / Psychiatric Alert and Oriented  Speech Normal speech  Allergies  1.Levofloxacin(Reaction:, Severity:Mild)  Medications (Info obtained by: Verbal)  1.ALPRAZolam 0.5 MG Oral Tab, Take 0.5 tablets (0.25 mg total) by mouth 3 (three) times daily as needed.  2.aspirin 81 MG Oral Tab EC, Take 1 tablet (81 mg total) by mouth daily.  3.busPIRone 15 MG Oral Tab, Take 1 tablet (15 mg total) by mouth 2 (two) times daily.  4.clopidogreL 75 mg tablet, Take 1 tablet orally once a day.  5.LEVOTHYROXINE 200 MCG Oral Tab, TAKE 1 TABLET(200 MCG) BY MOUTH BEFORE BREAKFAST  6.losartan 25 mg tablet, Take 1 tablet orally once a day.  7.metoprolol succinate ER 25 mg tablet,extended release 24 hr, Take 1 tablet orally once a day.  8.rosuvastatin 40 mg tablet, Take 1 tablet orally once a day.  9.venlafaxine  MG Oral Capsule SR 24 Hr, Take 1 capsule (150 mg total) by mouth 2 (two) times daily.  Impression  1.Angina unstable ([place])  2.PVCs (premature ventricular contractions)  3.Presence of drug coated stent in anterior descending branch of left coronary artery  4.Presence of drug coated stent in  left circumflex coronary artery  5.S/P CABG x 3  6.Benign essential HTN  7.CAD, multiple vessel  Assessment & Plan  Patient with chest pain which is improved.  Patient had history of stenting also prior bypass surgery.  Her LIMA, vein grafts are all open.  She is been having muscle aches and Crestor.  Given her age and severely premature coronary disease patient should go on Leqvio particular given her reaction to statins.    Plan  1 see Magalie for Leqvio  2.  Lipid profile in May  3.  See me in    Increased BMI: Provide patient with information regarding diet and lifestyle changes.  Labs and Diagnostics ordered  1.Lipid Panel_Fasting 2025 12:00:00 AM  Future appointments  1.Follow up visit - Terrence Samano MD 2025 12:00:00 AM  Miscellaneous  1.Weight monitoring (regime/therapy)  Nurses documentation  Upcoming surgeries/procedures: none  Use of assistive devices: none  Verbal order for EKG: none  Refills: none  Triage and medication list reviewed by: LB, BRIDGET   Patient instructions  per verbal MD order:   Testin. Complete fasting blood work in May 2025     Provider Follow Up:  1. Follow up with Dr. Samano in May 2025     Please bring in you medication bottles or updated medicine list to your next appointment.  Call [facility] if you have any problems or concerns at [phone]   Lab Details  BASIC METABOLIC PANEL (8)  2024 04:04:38 PM  GLUCOSE 92 70-99 mg/dL  F  SODIUM 140 136-145 mmol/L  F  POTASSIUM 4.3 3.5-5.1 mmol/L  F  CHLORIDE 106  mmol/L  F  CO2 29.0 21.0-32.0 mmol/L  F  ANION GAP 5 0-18 mmol/L  F  BUN 16 9-23 mg/dL  F  CREATININE 0.90 0.55-1.02 mg/dL  F  CALCIUM 9.9 8.7-10.4 mg/dL  F  OSMOLALITY CALCULATED 291 275-295 mOsm/kg  F  E GFR CR 82 >=60 mL/min/1.73m2  F  FASTING PATIENT BMP ANSWER No   F  CBC, PLATELET; NO DIFFERENTIAL  2024 03:59:12 PM  WBC 7.8 4.0-11.0 x10(3) uL  F  RED BLOOD COUNT 5.17 3.80-5.30 x10(6)uL  F  HGB 15.5 12.0-16.0 g/dL  F  HCT 45.7 35.0-48.0  %  F  PLATELETS 245.0 150.0-450.0 10(3)uL  F  MEAN CELL VOLUME 88.4 80.0-100.0 fL  F  MEAN CORPUSCULAR HEMOGLOBIN 30.0 26.0-34.0 pg  F  MEAN CORPUSCULAR HGB CONC 33.9 31.0-37.0 g/dL  F  RED CELL DISTRIBUTION WIDTH CV 13.2 %  F  LIPID PANEL  11/09/2024 09:52:29 AM  CHOLESTEROL 105 <200 mg/dL  F  HDL CHOL 39 40-59 mg/dL L F  TRIGLYCERIDES 128  mg/dL  F  LDL CHOLESTROL 43 <100 mg/dL  F  VLDL 18 0-30 mg/dL  F  NON HDL CHOL 66 <130 mg/dL  F  FASTING PATIENT LIPID ANSWER Yes   F  HEMOGLOBIN A1C  05/14/2024 01:23:36 PM  HGBA1C 5.3 <5.7 %  F  ESTIMATED AVERAGE GLUCOSE 105  mg/dL  F  GLUCOSE, SERUM  05/14/2024 01:06:51 PM  GLUCOSE 109 70-99 mg/dL H F  FASTING PATIENT GLUCOSE ANSWER Yes   F  CBC W/ DIFFERENTIAL  05/11/2024 10:11:37 AM  WBC 5.6 4.0-11.0 x10(3) uL  F  RED BLOOD COUNT 4.77 3.80-5.30 x10(6)uL  F  HGB 14.2 12.0-16.0 g/dL  F  HCT 43.7 35.0-48.0 %  F  PLATELETS 191.0 150.0-450.0 10(3)uL  F  MEAN CELL VOLUME 91.6 80.0-100.0 fL  F  MEAN CORPUSCULAR HEMOGLOBIN 29.8 26.0-34.0 pg  F  MEAN CORPUSCULAR HGB CONC 32.5 31.0-37.0 g/dL  F  RED CELL DISTRIBUTION WIDTH CV 13.4 %  F  NEUTROPHIL ABS PRELIM 3.56 1.50-7.70 x10 (3) uL  F  NEUTROPHIL ABSOLUTE 3.56 1.50-7.70 x10(3) uL  F  LYMPHOCYTE ABSOLUTE 1.22 1.00-4.00 x10(3) uL  F  MONOCYTE ABSOLUTE 0.75 0.10-1.00 x10(3) uL  F  EOSINOPHIL ABSOLUTE 0.02 0.00-0.70 x10(3) uL  F  BASOPHIL ABSOLUTE 0.01 0.00-0.20 x10(3) uL  F  IMMATURE GRANULOCYTE COUNT 0.01 0.00-1.00 x10(3) uL  F  NEUTROPHIL % 63.8 %  F  LYMPHOCYTE % 21.9 %  F  MONOCYTE % 13.5 %  F  EOSINOPHIL % 0.4 %  F  BASOPHIL % 0.2 %  F  IMMATURE GRANULOCYTE RATIO % 0.2 %  F  COMP METABOLIC PANEL (14)  05/11/2024 10:01:01 AM  GLUCOSE 113 70-99 mg/dL H F  SODIUM 142 136-145 mmol/L  F  POTASSIUM 4.3 3.5-5.1 mmol/L  F  CHLORIDE 109  mmol/L  F  CO2 28.0 21.0-32.0 mmol/L  F  ANION GAP 5 0-18 mmol/L  F  BUN 14 9-23 mg/dL  F  CREATININE 1.08 0.55-1.02 mg/dL H F  CALCIUM 8.9 8.5-10.1 mg/dL  F  OSMOLALITY  CALCULATED 295 275-295 mOsm/kg  F  E GFR CR 66 >=60 mL/min/1.73m2  F  AST 20 15-37 U/L  F  ALT 40 13-56 U/L  F  ALKALINE PHOSPHATASE 89 37-98 U/L  F  BILIRUBIN, TOTAL 0.4 0.1-2.0 mg/dL  F  TOTAL PROTEIN 7.0 6.4-8.2 g/dL  F  ALBUMIN 3.7 3.4-5.0 g/dL  F  GLOBULIN 3.3 2.8-4.4 g/dL  F  A/G RATIO 1.1 1.0-2.0  F  FASTING PATIENT CMP ANSWER Yes   F  Diagnostics Details  Trans Thoracic Echocardiogram 06/17/2024  1.The study quality is average.    2.The left ventricle is normal in size and wall thickness is within normal limits. Global left ventricular systolic function is normal. The left ventricular ejection fraction is 55%. Left ventricular diastolic function is impaired (Grade I) with normal left atrial pressure. No regional wall motion abnormalities noted.    3.The right ventricle is normal in size. Right ventricular systolic function is normal.    4.The estimated pulmonary artery systolic pressure is 18 mmHg assuming a right atrial pressure of 3 mmHg.    ACTMonitoring 09/25/2023  1.This is a good quality study.    2.Predominant rhythm is sinus tachycardia.    3.The minimum heart rate recorded was 62 beats / minute (9/27/2023). The maximum heart rate is 135 beats / minute (sinus tachycardia, 9/30/2023). The mean heart rate is 89 beats / minute.    4.Premature ventricular contractions noted.    5.Bigeminy and trigeminy are noted.    6.Couplets are noted.    7.0% burden Af/fl Symptoms recorded(palpitations) correlate with SR.    Lower Extremity Venous Ultrasound 09/14/2023  1.The study quality is good.    2.Normal compressibility, augmentation, and phasic flow in the left lower extremity venous system.    3.No evidence of acute deep venous thrombosis in the left lower extremity.    Exercise Treadmill Testing 05/24/2023  1.Stress EKG is normal.    CPOE Orders carried out by: Brandy SLAUGHTER  Care Providers: Terrence Samano MD and Brandy SLAUGHTER  Electronically Authenticated by  Terrence Samano MD  01/23/2025  04:53:55 PM  Disclaimer: Components of this note were documented using voice recognition system and are subject to errors not corrected at proofreading. Contact the author of this note for any clarifications.

## 2025-04-10 NOTE — ED PROVIDER NOTES
Patient Seen in: [facility] Emergency Department      History     Chief Complaint   Patient presents with    Chest Pain Angina     Stated Complaint:     Subjective:   HPI      This is a 44-year-old female who arrives here with complaints of chest pain that started last night.  She said she got really sweaty and diaphoretic.  The pain is only there when she takes a deep breath she is not short of breath she denies any calf pain history of blood that she has had history of coronary disease had a bypass 2 years ago.  Recently she did have an angiogram.  That did not show any blockage.  She does have a history of hypertension hypothyroidism previous heart attack, anxiety.  The pain is only there when she takes a deep breath.  She denies any history of blood clots.  Denies any calf pain or recent travel.  Denies any cough or fever.  The patient states that he only has pain when she takes a deep breath and does not feel like her typical cardiac pain.    Objective:     Past Medical History:    Allergic rhinitis    Anxiety    Anxiety state    Coronary atherosclerosis    Depression    Disorder of thyroid    Endometriosis    Hysterectomy     Heart attack (HCC)    High blood pressure    High cholesterol    Hx of migraines    Hyperthyroidism    Hypothyroidism    Migraines    Obesity    Pre-eclampsia (HCC)    Unspecified essential hypertension    Unspecified hypothyroidism              Past Surgical History:   Procedure Laterality Date    Abdominal surgery  2011 and 2013    Hysterectomy     Anesth,vaginal procedures  2021    Suturing of Vaginal Cuff    Angioplasty (coronary)  2023          Cabg      Cabg      Cyst removal      Heart surgery      Hysterectomy  2021    Dr. otero total laparoscopic hysterectomy, bilateral salpingectomy, postop cuff bleeding    Other surgical history      Ovarian cyst                Social History     Socioeconomic History    Marital status:     Tobacco Use    Smoking status: Never    Smokeless tobacco: Never    Tobacco comments:     none at all   Vaping Use    Vaping status: Never Used   Substance and Sexual Activity    Alcohol use: Not Currently     Comment: very rare    Drug use: No    Sexual activity: Yes     Partners: Male     Birth control/protection: Hysterectomy   Other Topics Concern    Caffeine Concern No    Stress Concern No    Weight Concern Yes    Special Diet No    Exercise Yes    Seat Belt Yes     Social Drivers of Health     Transportation Needs: No Transportation Needs (5/11/2023)    Transportation Needs     Lack of Transportation: No                  Physical Exam     ED Triage Vitals   BP 04/10/25 1218 126/87   Pulse 04/10/25 1218 106   Resp 04/10/25 1218 19   Temp 04/10/25 1220 96.9 °F (36.1 °C)   Temp src 04/10/25 1220 Tympanic   SpO2 04/10/25 1218 100 %   O2 Device 04/10/25 1218 None (Room air)       Current Vitals:   Vital Signs  BP: 112/80  Pulse: 95  Resp: 19  Temp: 96.9 °F (36.1 °C)  Temp src: Tympanic  MAP (mmHg): 91    Oxygen Therapy  SpO2: 100 %  O2 Device: None (Room air)        Physical Exam  General: .  Patient is in no respiratory distress.  She is here with her ..  The patient is only having pain when she takes a deep breath.  The patient is in no respiratory distress    HEENT: Atraumatic, conjunctiva are not pale.  There is no icterus.  Oral mucosa Is wet.  No facial trauma.  The neck is supple.    LUNGS: Clear to auscultation, there is no wheezing or retraction.  No crackles.    CV: Cardiovascular is regular without murmurs or rubs.    ABD: The abdomen is soft nondistended nontender.  There is no rebound.  There is no guarding.    EXT: There is good pulses bilaterally.  There is no calf tenderness.  There is no rash noted.  There is no edema    NEURO: Alert and oriented x4.  Muscle strength and sensory exam is grossly normal.  And the patient is neurologically intact with no focal findings.      ED Course      Labs Reviewed   COMP METABOLIC PANEL (14) - Abnormal; Notable for the following components:       Result Value    Albumin 5.1 (*)     A/G Ratio 2.3 (*)     All other components within normal limits   TROPONIN I HIGH SENSITIVITY - Normal   D-DIMER - Normal   TROPONIN I HIGH SENSITIVITY - Normal   SARS-COV-2/FLU A AND B/RSV BY PCR (GENEXPERT) - Normal    Narrative:     This test is intended for the qualitative detection and differentiation of SARS-CoV-2, influenza A, influenza B, and respiratory syncytial virus (RSV) viral RNA in nasopharyngeal or nares swabs from individuals suspected of respiratory viral infection consistent with COVID-19 by their healthcare provider. Signs and symptoms of respiratory viral infection due to SARS-CoV-2, influenza, and RSV can be similar.    Test performed using the Xpert Xpress SARS-CoV-2/FLU/RSV (real time RT-PCR)  assay on the [employer] instrument, [employer], [place] [address].   This test is being used under the Food and Drug Administration's Emergency Use Authorization.    The authorized Fact Sheet for Healthcare Providers for this assay is available upon request from the laboratory.   CBC WITH DIFFERENTIAL WITH PLATELET   RAINBOW DRAW LAVENDER   RAINBOW DRAW LIGHT GREEN   RAINBOW DRAW BLUE            The patient was placed on monitors, IV was started, blood was drawn.   Workup was done to rule out acute coronary syndrome PE was considered pneumonia pneumothorax was considered.    The EKG shows sinus tachycardia.  There is no acute ST elevations or ischemic findings.  The rest of the EKG including rate rhythm axis and intervals I agree with the EKG report . The rate is 104.  There is sinus tachycardia normal axis no acute ST elevation QRS duration is 94.  Rest of the EKG report I do agree with except for the possible ST elevation laterally.  I do not see any obvious findings of acute ischemia at this present time.    When compared to an old EKG QRS morphology is not much  different she did seem to be a little faster in terms of her heart rate.  At this present time     MDM        Left ventriculogram revealed an ejection fraction of 45% with mild anterior hypokinesis.     The stent to the LAD is widely patent.  There is some narrowing at the proximal LAD.  This is about 70%.  It is eccentric, and mostly it looked reasonably patent.  Circumflex is patent.     Right coronary artery is totally occluded.       Vein graft to the right is widely patent.     Vein graft to the obtuse marginal is widely patent.     LIMA to the LAD is patent.    The patient did have a angiogram from January of this year which did not show anything acute D-dimer is negative comprehensive is normal troponins negative CBC is normal.      I personally reviewed the radiographs and my individual interpretation shows        Also reviewed official report and it shows  XR CHEST AP PORTABLE  (CPT=71045)  Result Date: 4/10/2025  PROCEDURE:  XR CHEST AP PORTABLE  (CPT=71045)  TECHNIQUE:  AP chest radiograph was obtained.  COMPARISON:  [facility] , XR, XR CHEST PA + LAT CHEST (CPT=71046), 12/30/2024, 3:04 PM.  [facility] , XR, XR CHEST AP PORTABLE  (CPT=71045), 9/09/2023, 7:20 PM.  INDICATIONS:  pain  PATIENT STATED HISTORY: (As transcribed by Technologist)  Patient states that she has had chest pain since last night. She had heart surgery 2 years ago.    FINDINGS:  Cardiac size and pulmonary vasculature are within normal limits. No pleural effusions. No pneumothorax.  Median sternotomy approximated by wire sutures.             CONCLUSION:  No acute pulmonary findings.   LOCATION:  [facility]      Dictated by (CST): Samantha Stearns MD on 4/10/2025 at 1:02 PM     Finalized by (CST): Samantha Stearns MD on 4/10/2025 at 1:02 PM       Repeat EKG was done which showed    The EKG shows sinus tachycardia.  Normal axis, QRS duration is 92..  There is no acute ST elevations or ischemic findings.  The rest of the EKG including rate rhythm  axis and intervals I agree with the EKG report . The rate is 103.          Patient did go down heart rate did go down to 98.  She has no pain on repeat exam it does sound to be pleuritic.  I discussed this case with the APN for [facility] who did talk to Dr. Samano who knows her well.  The patient had a recent angiogram that did not show any new blockages.  EKG does not show any acute abnormalities.  On repeat exam she had no chest pain no shortness of breath.  She want to go home.  I discussed with her I would at least get a repeat troponin and she does not to wait for the repeat troponin she want to go home..  She did leave but I did get the troponin the second troponin was negative..  The patient felt comfortable going home.  I discussed if she has any recurrent chest pain or shortness of breath return here I discussed that I do not think it is an acute coronary syndrome based on the workup I do not see in the acute.  And she was going to see her cardiologist tomorrow she did not.  Both her and her  felt comfortable going home did not want to stay in the hospital.  The patient was discharged home.  Before the second home troponin but the repeat troponin did come back and it was negative.  She was pain-free on repeat examination heart rate was come down to 95 she said 100% had no pain or discomfort.      I discussed with the patient that were seeing them  in a short period of time.  I discussed with them that there is always a possibility that things can change and a need reevaluation with their primary care physician as soon as possible.  I've also discussed with them that if the pain gets worse to return to the emergency room immediately.  Medical Decision Making      Disposition and Plan     Clinical Impression:  1. Chest pain of uncertain etiology    2. Pleurisy         Disposition:  Discharge  4/10/2025  2:15 pm    Follow-up:  Margie Thornton DO  [address] [place] [address]  [id number]  [phone]    Follow up      Terrence Samano MD  [address] [facility] [address]  [facility] [id number] [phone]    Follow up in 2 day(s)            Medications Prescribed:  Discharge Medication List as of 4/10/2025  2:18 PM        START taking these medications    Details   busPIRone 15 MG Oral Tab Take 1 tablet (15 mg total) by mouth 2 (two) times daily., Normal, Disp-180 tablet, R-1      venlafaxine  MG Oral Capsule SR 24 Hr Take 1 capsule (150 mg total) by mouth 2 (two) times daily., Normal, Disp-180 capsule, R-1                 Supplementary Documentation:

## 2025-04-11 ENCOUNTER — PATIENT OUTREACH (OUTPATIENT)
Dept: CASE MANAGEMENT | Age: 45
End: 2025-04-11

## 2025-04-11 NOTE — PROGRESS NOTES
ED Hospital Follow up for pcp (Discharge 4/10 EDW)       PCP   Margie Thornton  [facility] ([place], [place])  [address] [place] [id number]  [phone]  Attempt #1:  Left message on voicemail for patient to call transitions specialist back to schedule follow up appointments. Provided Transitions specialist scheduling phone number [phone].

## 2025-04-15 NOTE — PROGRESS NOTES
ED Hospital Follow up for pcp (Discharge 4/10 EDW)       PCP   Margie Thornton  [facility]  [address] [place] [address] [address]  [place] [address]  [phone]  Existing appt for 4/25@11am    Confirmed with pt   Closing encounter

## 2025-04-18 DIAGNOSIS — F33.2 SEVERE RECURRENT MAJOR DEPRESSION WITHOUT PSYCHOTIC FEATURES (HCC): ICD-10-CM

## 2025-04-18 DIAGNOSIS — F41.1 GAD (GENERALIZED ANXIETY DISORDER): ICD-10-CM

## 2025-04-22 ENCOUNTER — APPOINTMENT (OUTPATIENT)
Dept: GENERAL RADIOLOGY | Age: 45
End: 2025-04-22
Attending: NURSE PRACTITIONER
Payer: COMMERCIAL

## 2025-04-22 ENCOUNTER — HOSPITAL ENCOUNTER (EMERGENCY)
Facility: HOSPITAL | Age: 45
Discharge: HOME | End: 2025-04-23
Attending: EMERGENCY MEDICINE
Payer: COMMERCIAL

## 2025-04-22 ENCOUNTER — APPOINTMENT (OUTPATIENT)
Dept: CT IMAGING | Facility: HOSPITAL | Age: 45
End: 2025-04-22
Attending: EMERGENCY MEDICINE
Payer: COMMERCIAL

## 2025-04-22 ENCOUNTER — HOSPITAL ENCOUNTER (OUTPATIENT)
Age: 45
Discharge: ANOTHER HOSPITAL | End: 2025-04-22
Attending: EMERGENCY MEDICINE
Payer: COMMERCIAL

## 2025-04-22 VITALS
SYSTOLIC BLOOD PRESSURE: 128 MMHG | RESPIRATION RATE: 20 BRPM | HEIGHT: 67 IN | OXYGEN SATURATION: 98 % | DIASTOLIC BLOOD PRESSURE: 95 MMHG | BODY MASS INDEX: 37.67 KG/M2 | HEART RATE: 106 BPM | TEMPERATURE: 98 F | WEIGHT: 240 LBS

## 2025-04-22 VITALS — OXYGEN SATURATION: 98 %

## 2025-04-22 DIAGNOSIS — R07.81 PLEURITIC CHEST PAIN: Primary | ICD-10-CM

## 2025-04-22 DIAGNOSIS — R09.1 PLEURISY: Primary | ICD-10-CM

## 2025-04-22 LAB
#MXD IC: 0.6 X10ˆ3/UL (ref 0.1–1)
ALBUMIN SERPL-MCNC: 4.8 G/DL (ref 3.2–4.8)
ALBUMIN/GLOB SERPL: 1.8 {RATIO} (ref 1–2)
ALP LIVER SERPL-CCNC: 92 U/L (ref 37–98)
ALT SERPL-CCNC: 22 U/L (ref 10–49)
ANION GAP SERPL CALC-SCNC: 7 MMOL/L (ref 0–18)
AST SERPL-CCNC: 18 U/L (ref ?–34)
BASOPHILS # BLD AUTO: 0.01 X10(3) UL (ref 0–0.2)
BASOPHILS NFR BLD AUTO: 0.1 %
BILIRUB SERPL-MCNC: 0.5 MG/DL (ref 0.3–1.2)
BUN BLD-MCNC: 15 MG/DL (ref 9–23)
BUN BLD-MCNC: 16 MG/DL (ref 7–18)
CALCIUM BLD-MCNC: 10.2 MG/DL (ref 8.7–10.6)
CHLORIDE BLD-SCNC: 103 MMOL/L (ref 98–112)
CHLORIDE SERPL-SCNC: 104 MMOL/L (ref 98–112)
CO2 BLD-SCNC: 25 MMOL/L (ref 21–32)
CO2 SERPL-SCNC: 26 MMOL/L (ref 21–32)
CREAT BLD-MCNC: 0.84 MG/DL (ref 0.55–1.02)
CREAT BLD-MCNC: 0.9 MG/DL (ref 0.55–1.02)
DDIMER WHOLE BLOOD: 1840 NG/ML DDU (ref ?–400)
EGFRCR SERPLBLD CKD-EPI 2021: 82 ML/MIN/1.73M2 (ref 60–?)
EGFRCR SERPLBLD CKD-EPI 2021: 89 ML/MIN/1.73M2 (ref 60–?)
EOSINOPHIL # BLD AUTO: 0 X10(3) UL (ref 0–0.7)
EOSINOPHIL NFR BLD AUTO: 0 %
ERYTHROCYTE [DISTWIDTH] IN BLOOD BY AUTOMATED COUNT: 12.9 %
GLOBULIN PLAS-MCNC: 2.6 G/DL (ref 2–3.5)
GLUCOSE BLD-MCNC: 86 MG/DL (ref 70–99)
GLUCOSE BLD-MCNC: 90 MG/DL (ref 70–99)
HCT VFR BLD AUTO: 40.9 % (ref 35–48)
HCT VFR BLD AUTO: 41 % (ref 35–48)
HCT VFR BLD CALC: 40 % (ref 34–50)
HGB BLD-MCNC: 13.2 G/DL (ref 12–16)
HGB BLD-MCNC: 13.7 G/DL (ref 12–16)
IMM GRANULOCYTES # BLD AUTO: 0.03 X10(3) UL (ref 0–1)
IMM GRANULOCYTES NFR BLD: 0.4 %
ISTAT IONIZED CALCIUM FOR CHEM 8: 1.19 MMOL/L (ref 1.12–1.32)
LYMPHOCYTES # BLD AUTO: 1.13 X10(3) UL (ref 1–4)
LYMPHOCYTES # BLD AUTO: 1.4 X10ˆ3/UL (ref 1–4)
LYMPHOCYTES NFR BLD AUTO: 13.2 %
LYMPHOCYTES NFR BLD AUTO: 15.5 %
MCH RBC QN AUTO: 28.5 PG (ref 26–34)
MCH RBC QN AUTO: 29 PG (ref 26–34)
MCHC RBC AUTO-ENTMCNC: 32.2 G/DL (ref 31–37)
MCHC RBC AUTO-ENTMCNC: 33.5 G/DL (ref 31–37)
MCV RBC AUTO: 86.5 FL (ref 80–100)
MCV RBC AUTO: 88.6 FL (ref 80–100)
MIXED CELL %: 6.5 %
MONOCYTES # BLD AUTO: 0.68 X10(3) UL (ref 0.1–1)
MONOCYTES NFR BLD AUTO: 8 %
NEUTROPHILS # BLD AUTO: 6.7 X10 (3) UL (ref 1.5–7.7)
NEUTROPHILS # BLD AUTO: 6.7 X10(3) UL (ref 1.5–7.7)
NEUTROPHILS # BLD AUTO: 7.1 X10ˆ3/UL (ref 1.5–7.7)
NEUTROPHILS NFR BLD AUTO: 78 %
NEUTROPHILS NFR BLD AUTO: 78.3 %
OSMOLALITY SERPL CALC.SUM OF ELEC: 284 MOSM/KG (ref 275–295)
PLATELET # BLD AUTO: 240 10(3)UL (ref 150–450)
PLATELET # BLD AUTO: 300 X10ˆ3/UL (ref 150–450)
POTASSIUM BLD-SCNC: 4.2 MMOL/L (ref 3.6–5.1)
POTASSIUM SERPL-SCNC: 4.2 MMOL/L (ref 3.5–5.1)
PROT SERPL-MCNC: 7.4 G/DL (ref 5.7–8.2)
RBC # BLD AUTO: 4.63 X10ˆ6/UL (ref 3.8–5.3)
RBC # BLD AUTO: 4.73 X10(6)UL (ref 3.8–5.3)
SODIUM BLD-SCNC: 138 MMOL/L (ref 136–145)
SODIUM SERPL-SCNC: 137 MMOL/L (ref 136–145)
TROPONIN I BLD-MCNC: <0.02 NG/ML (ref ?–0.05)
TROPONIN I SERPL HS-MCNC: <3 NG/L (ref ?–34)
WBC # BLD AUTO: 8.6 X10(3) UL (ref 4–11)
WBC # BLD AUTO: 9.1 X10ˆ3/UL (ref 4–11)

## 2025-04-22 PROCEDURE — 93005 ELECTROCARDIOGRAM TRACING: CPT

## 2025-04-22 PROCEDURE — 99285 EMERGENCY DEPT VISIT HI MDM: CPT

## 2025-04-22 PROCEDURE — 93010 ELECTROCARDIOGRAM REPORT: CPT

## 2025-04-22 PROCEDURE — 36415 COLL VENOUS BLD VENIPUNCTURE: CPT

## 2025-04-22 PROCEDURE — 71275 CT ANGIOGRAPHY CHEST: CPT | Performed by: EMERGENCY MEDICINE

## 2025-04-22 PROCEDURE — 99215 OFFICE O/P EST HI 40 MIN: CPT

## 2025-04-22 PROCEDURE — 85025 COMPLETE CBC W/AUTO DIFF WBC: CPT | Performed by: NURSE PRACTITIONER

## 2025-04-22 PROCEDURE — 80047 BASIC METABLC PNL IONIZED CA: CPT

## 2025-04-22 PROCEDURE — 80053 COMPREHEN METABOLIC PANEL: CPT | Performed by: EMERGENCY MEDICINE

## 2025-04-22 PROCEDURE — 71046 X-RAY EXAM CHEST 2 VIEWS: CPT | Performed by: NURSE PRACTITIONER

## 2025-04-22 PROCEDURE — 84484 ASSAY OF TROPONIN QUANT: CPT | Performed by: EMERGENCY MEDICINE

## 2025-04-22 PROCEDURE — 85378 FIBRIN DEGRADE SEMIQUANT: CPT | Performed by: NURSE PRACTITIONER

## 2025-04-22 PROCEDURE — 84484 ASSAY OF TROPONIN QUANT: CPT

## 2025-04-22 PROCEDURE — 80053 COMPREHEN METABOLIC PANEL: CPT

## 2025-04-22 PROCEDURE — 99284 EMERGENCY DEPT VISIT MOD MDM: CPT

## 2025-04-22 RX ORDER — ALPRAZOLAM 0.5 MG
0.25 TABLET ORAL 3 TIMES DAILY PRN
Qty: 30 TABLET | Refills: 0 | OUTPATIENT
Start: 2025-04-22

## 2025-04-22 RX ORDER — ALPRAZOLAM 0.5 MG
0.25 TABLET ORAL 3 TIMES DAILY PRN
Qty: 30 TABLET | Refills: 2 | Status: SHIPPED | OUTPATIENT
Start: 2025-04-22

## 2025-04-22 NOTE — TELEPHONE ENCOUNTER
A refill request was received for:  Requested Prescriptions     Pending Prescriptions Disp Refills    ALPRAZolam 0.5 MG Oral Tab 30 tablet 2     Sig: Take 0.5 tablets (0.25 mg total) by mouth 3 (three) times daily as needed for Anxiety.     Patient comment: Refills on this prescription  because its been over 1 year since I last filled   Last refill date: 2024      Last office visit:2024 annual     Follow up due:  Future Appointments   Date Time Provider Department Center   2025  5:00 PM Linda Goyal PA-C [facility] [address] [facility]   2025  3:45 PM PF [place] RN1 PF [facility]

## 2025-04-23 NOTE — ED PROVIDER NOTES
Patient Seen in: [facility] Emergency Department      History     Chief Complaint   Patient presents with    Chest Pain Angina     Stated Complaint: pain on inhalation, elevated d dimer, sent from IC, cardiac pt    Subjective:   HPI    Patient is a 44-year-old female who has been sent over from immediate care for chest discomfort.  Has been for 2 weeks.  Is worse when she breathes.  Was seen by cardiology according to the patient.  Was advised it was not cardiac.  She was seen in the outpatient clinic had an elevated D-dimer sent here for further evaluation.      History of Present Illness               Objective:     Past Medical History:    Allergic rhinitis    Anxiety    Anxiety state    Coronary atherosclerosis    Depression    Disorder of thyroid    Endometriosis    Hysterectomy     Heart attack (HCC)    High blood pressure    High cholesterol    Hx of migraines    Hyperthyroidism    Hypothyroidism    Migraines    Obesity    Pre-eclampsia (HCC)    Unspecified essential hypertension    Unspecified hypothyroidism              Past Surgical History:   Procedure Laterality Date    Abdominal surgery  2011 and 2013    Hysterectomy     Anesth,vaginal procedures  2021    Suturing of Vaginal Cuff    Angioplasty (coronary)  2023          Cabg      Cabg      Cyst removal      Heart surgery      Hysterectomy  2021    Dr. otero total laparoscopic hysterectomy, bilateral salpingectomy, postop cuff bleeding    Other surgical history      Ovarian cyst                Social History     Socioeconomic History    Marital status:    Tobacco Use    Smoking status: Never    Smokeless tobacco: Never    Tobacco comments:     none at all   Vaping Use    Vaping status: Never Used   Substance and Sexual Activity    Alcohol use: Not Currently     Comment: very rare    Drug use: No    Sexual activity: Yes     Partners: Male     Birth control/protection: Hysterectomy   Other  Topics Concern    Caffeine Concern No    Stress Concern No    Weight Concern Yes    Special Diet No    Exercise Yes    Seat Belt Yes     Social Drivers of Health     Transportation Needs: No Transportation Needs (5/11/2023)    Transportation Needs     Lack of Transportation: No                                Physical Exam     ED Triage Vitals [04/22/25 2048]   BP (!) 128/95   Pulse 106   Resp 20   Temp 98 °F (36.7 °C)   Temp src Oral   SpO2 98 %   O2 Device None (Room air)       Current Vitals:   Vital Signs  BP: (!) 128/95  Pulse: 106  Resp: 20  Temp: 98 °F (36.7 °C)  Temp src: Oral  MAP (mmHg): (!) 106    Oxygen Therapy  SpO2: 98 %  O2 Device: None (Room air)        Physical Exam  Vitals and nursing note reviewed.   Constitutional:       General: She is not in acute distress.     Appearance: She is well-developed. She is not toxic-appearing.   HENT:      Head: Normocephalic and atraumatic.   Eyes:      General: No scleral icterus.     Conjunctiva/sclera: Conjunctivae normal.   Cardiovascular:      Rate and Rhythm: Normal rate.      Heart sounds: Normal heart sounds.   Pulmonary:      Effort: Pulmonary effort is normal. No respiratory distress.      Breath sounds: Normal breath sounds.   Abdominal:      General: There is no distension.   Musculoskeletal:         General: No tenderness. Normal range of motion.      Cervical back: Normal range of motion and neck supple.   Skin:     General: Skin is warm and dry.      Findings: No rash.   Neurological:      Mental Status: She is alert and oriented to person, place, and time.      Motor: No abnormal muscle tone.      Coordination: Coordination normal.   Psychiatric:         Behavior: Behavior normal.          Physical Exam                ED Course     Labs Reviewed   COMP METABOLIC PANEL (14) - Normal   TROPONIN I HIGH SENSITIVITY - Normal   CBC WITH DIFFERENTIAL WITH PLATELET   RAINBOW DRAW LAVENDER   RAINBOW DRAW LIGHT GREEN   RAINBOW DRAW BLUE     EKG    Rate,  intervals and axes as noted on EKG Report.  Rate: 109  Rhythm: Sinus Rhythm  Reading: Sinus tachycardia with premature complexes              Results                                  MDM           -History source other than patient - spouse        -Comorbidities did add complexity to the management are mentioned in the HPI above        -I personally reviewed the prior external notes and the medical record to obtain additional history -Reviewed last ED visit on April 10 she was seen here for the similar reason pleuritic chest pain.  Workup was negative        -DDX: Includes but not limited to acute coronary syndrome, PE, pleurisy which is a medical condition that can pose a threat to life/function           -I personally reviewed the CT findings and it shows no PE  Please refer to radiology report for official interpretation         Labs Reviewed   COMP METABOLIC PANEL (14) - Normal   TROPONIN I HIGH SENSITIVITY - Normal   CBC WITH DIFFERENTIAL WITH PLATELET   RAINBOW DRAW LAVENDER   RAINBOW DRAW LIGHT GREEN   RAINBOW DRAW BLUE     Laboratory workup is reassuring.  No acute findings on CT.  Likely pleurisy.  She has a follow-up appointment with her PCP this week.  Tylenol seems to help and was advised to continue this.  She was discharged home stable condition.      Medical Decision Making      Disposition and Plan     Clinical Impression:  1. Pleurisy         Disposition:  Discharge  4/23/2025 12:09 am    Follow-up:  Margie Thornton DO  [address] [address]  [phone]    Follow up            Medications Prescribed:  Current Discharge Medication List          Supplementary Documentation:

## 2025-04-23 NOTE — ED INITIAL ASSESSMENT (HPI)
Pt c/o lung pain with inspiration x2 weeks, pt seen in Er 1 week ago had full workup with neg results, pt followed up with cardiologist who told her it was not her lungs, pt has pmd appointmt on Thursday, pt concerned with pain stating \"I just want someone to listen to my lungs\"

## 2025-04-23 NOTE — ED PROVIDER NOTES
EKG    Rate, intervals and axes as noted on EKG Report.  Rate: 105  Rhythm: Sinus Rhythm  Reading: Sinus tachycardia, nonspecific ST changes, no significant change compared to previous    Chest x-ray No acute findings.         Patient did have normal troponin.  Patient's D-dimer was elevated.  CT a of the chest not available in the immediate care at this time.  Patient be transferred [facility] emergency department for further evaluation and likely CTA.  I did speak with the ER staff.  I did consider pneumonia, PE, ACS.  Patient declined ambulance transfer and is competent to decline.  I did recommend having somebody drive the patient there.

## 2025-04-23 NOTE — ED PROVIDER NOTES
Patient Seen in: [facility]      History     Chief Complaint   Patient presents with    Chest Pain Angina     Stated Complaint: Lung hurts when taking breath    Subjective:   HPI    Patient is a 44-year-old female with a history of CABG, hypertension, anxiety, here today with complaints of anterior chest pain taking deep breaths.  Reports that this has been ongoing for over a week.  She was seen in the emergency department for the same and had a completely negative workup.  She was also seen by her cardiologist who scheduled her for a stress test on Thursday.  And has an appointment with a pulmonologist this week as well.  She is here today because she wants somebody to \"listen to her lungs\" she reports that she has had no cough, congestion, fever, chills.  She has no known ill contacts.  Does report that she has intermittent difficulty catching her breath, especially walking up and down the stairs.  Does not report any extremity swelling.      History of Present Illness               Objective:     Past Medical History:    Allergic rhinitis    Anxiety    Anxiety state    Coronary atherosclerosis    Depression    Disorder of thyroid    Endometriosis    Hysterectomy     Heart attack (HCC)    High blood pressure    High cholesterol    Hx of migraines    Hyperthyroidism    Hypothyroidism    Migraines    Obesity    Pre-eclampsia (HCC)    Unspecified essential hypertension    Unspecified hypothyroidism              Past Surgical History:   Procedure Laterality Date    Abdominal surgery  2011 and 2013    Hysterectomy     Anesth,vaginal procedures  2021    Suturing of Vaginal Cuff    Angioplasty (coronary)  2023          Cabg      Cabg      Cyst removal      Heart surgery      Hysterectomy  2021    Dr. otero total laparoscopic hysterectomy, bilateral salpingectomy, postop cuff bleeding    Other surgical history      Ovarian cyst                Social  History     Socioeconomic History    Marital status:    Tobacco Use    Smoking status: Never    Smokeless tobacco: Never    Tobacco comments:     none at all   Vaping Use    Vaping status: Never Used   Substance and Sexual Activity    Alcohol use: Not Currently     Comment: very rare    Drug use: No    Sexual activity: Yes     Partners: Male     Birth control/protection: Hysterectomy   Other Topics Concern    Caffeine Concern No    Stress Concern No    Weight Concern Yes    Special Diet No    Exercise Yes    Seat Belt Yes     Social Drivers of Health     Transportation Needs: No Transportation Needs (5/11/2023)    Transportation Needs     Lack of Transportation: No              Review of Systems    Positive for stated complaint: Lung hurts when taking breath  Other systems are as noted in HPI.  Constitutional and vital signs reviewed.      All other systems reviewed and negative except as noted above.                  Physical Exam     ED Triage Vitals   BP 04/22/25 1921 (!) (P) 138/92   Pulse 04/22/25 1921 (P) 108   Resp 04/22/25 1921 (P) 16   Temp 04/22/25 1921 (P) 98.2 °F (36.8 °C)   Temp src 04/22/25 1921 (P) Oral   SpO2 04/22/25 1950 98 %   O2 Device --        Current Vitals:   Oxygen Therapy  SpO2: 98 %        Physical Exam  VS: Vital signs reviewed. O2 saturation within normal limits for this patient     General: Patient is awake and alert, oriented to person, place and time. Not in acute distress.      HEENT: Head is normocephalic atraumatic. Pupils reactive bilaterally.  EOMs intact.  No oral pallor. Mucous membranes moist.      Heart: S1-S2.  Regular rate and rhythm.       Lungs: good inspiratory effort. +air entry bilaterally without wheezes, rhonchi, crackles.  No accessory muscle use or tachypnea.       Extremities: No edema.  Pulses 2+ extremities.   Brisk capillary refill noted.      Skin: Normal skin turgor     CNS: Moves all 4 extremities.  Interacts appropriately.  No tremor.  No gait  abnormality    Differential diagnosis: pe, pneumonia, mi, nstemi      Physical Exam                ED Course     Labs Reviewed   D-DIMER (POC) - Abnormal; Notable for the following components:       Result Value    D-Dimer DDU 1,840 (*)     All other components within normal limits   POCT ISTAT CHEM8 CARTRIDGE - Normal   ISTAT TROPONIN - Normal   POCT CBC     EKG    Rate, intervals and axes as noted on EKG Report.  Rate: 105  Rhythm: Sinus Rhythm  Reading: Sinus tachycardia at a rate of 105, IA interval 152, QRS of 86.  No ST elevation or depression.  This is an otherwise normal EKG.  This EKG was seen and assessed by my attending physician  EKG was independently interpreted by myself            Results            I have personally  reviewed available prior medical records for any recent pertinent discharge summaries/testing. Patient/family updated on results and plan, a verbalized understanding and agreement with the plan.  I explained to the patient that emergent conditions may arise and to go to the ER for new, worsening or any persistent conditions. I've explained the importance of taking all medicatons as prescribed, follow up, and return precuations,  All questions answered.    Please note that this report has been produced using speech recognition software and may contain errors related to that system including, but not limited to, errors in grammar, punctuation, and spelling, as well as words and phrases that possibly may have been recognized inappropriately.  If there are any questions or concerns, contact the dictating provider for clarification.                     MDM      Signout to my attending physician Dr. Browning for dispo.            Medical Decision Making      Disposition and Plan     Clinical Impression:  1. Pleuritic chest pain         Disposition:  Ic to ed  4/22/2025  8:06 pm    Follow-up:  No follow-up provider specified.        Medications Prescribed:  Discharge Medication List as of  4/22/2025  8:07 PM          Supplementary Documentation:

## 2025-04-23 NOTE — DISCHARGE INSTRUCTIONS
Go directly to [facility] emergency department for further evaluation.  Have somebody drive you there.

## 2025-04-23 NOTE — ED INITIAL ASSESSMENT (HPI)
Sent from [facility] for R/O PE, chest pain with inspiration x 10 days. Seen in this ED 10 days ago  for chest pain.  Denies any travel or airplane.  Hx of CABG

## 2025-04-24 ENCOUNTER — OFFICE VISIT (OUTPATIENT)
Dept: FAMILY MEDICINE CLINIC | Facility: CLINIC | Age: 45
End: 2025-04-24
Payer: COMMERCIAL

## 2025-04-24 VITALS
RESPIRATION RATE: 16 BRPM | HEART RATE: 98 BPM | DIASTOLIC BLOOD PRESSURE: 90 MMHG | OXYGEN SATURATION: 96 % | SYSTOLIC BLOOD PRESSURE: 122 MMHG | WEIGHT: 245 LBS | HEIGHT: 67 IN | BODY MASS INDEX: 38.45 KG/M2

## 2025-04-24 DIAGNOSIS — R09.1 PLEURISY: Primary | ICD-10-CM

## 2025-04-24 PROCEDURE — 3080F DIAST BP >= 90 MM HG: CPT | Performed by: PHYSICIAN ASSISTANT

## 2025-04-24 PROCEDURE — 3074F SYST BP LT 130 MM HG: CPT | Performed by: PHYSICIAN ASSISTANT

## 2025-04-24 PROCEDURE — 3008F BODY MASS INDEX DOCD: CPT | Performed by: PHYSICIAN ASSISTANT

## 2025-04-24 PROCEDURE — 99214 OFFICE O/P EST MOD 30 MIN: CPT | Performed by: PHYSICIAN ASSISTANT

## 2025-04-24 RX ORDER — PREDNISONE 20 MG/1
TABLET ORAL
Qty: 13 TABLET | Refills: 0 | Status: SHIPPED | OUTPATIENT
Start: 2025-04-24

## 2025-04-24 RX ORDER — INCLISIRAN 284 MG/1.5ML
284 INJECTION, SOLUTION SUBCUTANEOUS
COMMUNITY
Start: 2025-02-18

## 2025-04-24 NOTE — PROGRESS NOTES
Subjective:   Estefany eRtana is a 44 year old female who presents for ER F/U (Pleurisy was the dx-wants to know what she cam do for tx-she can't take advil -tylenol helped but she does not want to take all the time every day)       History/Other:   History of Present Illness  Estefany Retana is a 44 year old female who presents with pleuritic chest pain following an ER visit.    She has been experiencing pleuritic chest pain for nearly two weeks, with pain varying in location, including below her breasts, in her shoulders, and in the middle of her chest. The pain has impacted her ability to perform normal activities, such as working out and reading out loud, due to shortness of breath. Initially, she felt better and canceled a previous appointment but had a recurrence of symptoms, prompting a return to the ER. The pain has slightly improved over the past two days.    She has a history of elevated D-dimer, but a chest scan ruled out blood clots. No history of adverse reactions to steroids, having taken them previously for sinus infections.    No cough or other respiratory symptoms. She denies any pain or swelling in her legs, and there is no tenderness upon palpation of the chest wall.     Chief Complaint Reviewed and Verified  Nursing Notes Reviewed and   Verified  Tobacco Reviewed  Allergies Reviewed  Medications Reviewed    OB Status Reviewed         Tobacco:  She has never smoked tobacco.    Current Medications[1]      Review of Systems:  Pertinent items are noted in HPI.      Objective:   /90   Pulse 98   Resp 16   Ht 5' 7\" (1.702 m)   Wt 245 lb (111.1 kg)   LMP 02/01/2021 (Approximate)   SpO2 96%   BMI 38.37 kg/m²  Estimated body mass index is 38.37 kg/m² as calculated from the following:    Height as of this encounter: 5' 7\" (1.702 m).    Weight as of this encounter: 245 lb (111.1 kg).  Results  LABS  D-dimer: elevated (04/22/2025)    RADIOLOGY  Chest CT: no pulmonary embolism  (04/22/2025)     Physical Exam  GENERAL: Alert, cooperative, well developed, no acute distress.  HEENT: Normocephalic, normal oropharynx, moist mucous membranes.  CHEST: Clear to auscultation bilaterally, no wheezes, rhonchi, or crackles, good airflow, no chest wall tenderness.  CARDIOVASCULAR: Normal heart rate and rhythm, S1 and S2 normal without murmurs.  EXTREMITIES: No cyanosis or edema, no tenderness.  NEUROLOGICAL: Cranial nerves grossly intact, moves all extremities without gross motor or sensory deficit.      Assessment & Plan:     Assessment & Plan  Pleurisy  Pleurisy likely due to pleural inflammation, possibly post-infectious. Cardiac issues and thromboembolism excluded.  - Prescribed steroid taper to reduce inflammation and alleviate pain.  - Informed that initial steroid dose may cause insomnia but will reduce symptoms before travel.  - Sent prescription to [facility].  -  Recording duration: 8 minutes        No follow-ups on file.        Irma Goyal PA-C, 4/24/2025, 5:06 PM           [1]   Current Outpatient Medications   Medication Sig Dispense Refill    inclisiran (LEQVIO) 284 MG/1.5ML Subcutaneous Solution Prefilled Syringe Inject 1.5 mL (284 mg total) into the skin every 6 (six) months.      ALPRAZolam 0.5 MG Oral Tab Take 0.5 tablets (0.25 mg total) by mouth 3 (three) times daily as needed for Anxiety. 30 tablet 2    busPIRone 15 MG Oral Tab Take 1 tablet (15 mg total) by mouth 2 (two) times daily. 180 tablet 1    venlafaxine  MG Oral Capsule SR 24 Hr Take 1 capsule (150 mg total) by mouth 2 (two) times daily. 180 capsule 1    Tirzepatide-Weight Management (ZEPBOUND) 10 MG/0.5ML Subcutaneous Solution Inject 10 mg into the skin once a week. 2 mL 5    levothyroxine 200 MCG Oral Tab Take 1 tablet (200 mcg total) by mouth before breakfast. 90 tablet 3    losartan 50 MG Oral Tab Take 0.5 tablets (25 mg total) by mouth daily.      metoprolol succinate ER 25 MG Oral Tablet 24 Hr 1 tablet (25 mg  total) daily.      rosuvastatin 40 MG Oral Tab Take 1 tablet (40 mg total) by mouth daily.      aspirin 81 MG Oral Tab EC Take 1 tablet (81 mg total) by mouth daily. 90 tablet 3    clopidogrel 75 MG Oral Tab Take 1 tablet (75 mg total) by mouth daily. 30 tablet 11

## 2025-04-25 LAB
ATRIAL RATE: 105 BPM
ATRIAL RATE: 109 BPM
P AXIS: 22 DEGREES
P AXIS: 53 DEGREES
P-R INTERVAL: 152 MS
P-R INTERVAL: 156 MS
Q-T INTERVAL: 316 MS
Q-T INTERVAL: 328 MS
QRS DURATION: 86 MS
QRS DURATION: 90 MS
QTC CALCULATION (BEZET): 425 MS
QTC CALCULATION (BEZET): 433 MS
R AXIS: 50 DEGREES
R AXIS: 52 DEGREES
T AXIS: 68 DEGREES
T AXIS: 82 DEGREES
VENTRICULAR RATE: 105 BPM
VENTRICULAR RATE: 109 BPM

## 2025-05-22 ENCOUNTER — OFFICE VISIT (OUTPATIENT)
Age: 45
End: 2025-05-22
Attending: INTERNAL MEDICINE
Payer: COMMERCIAL

## 2025-05-22 DIAGNOSIS — I25.10 CAD, MULTIPLE VESSEL: Primary | ICD-10-CM

## 2025-05-22 DIAGNOSIS — E78.2 MIXED HYPERLIPIDEMIA: ICD-10-CM

## 2025-05-22 NOTE — PROGRESS NOTES
Education Record    Learner:  Patient    Disease / Diagnosis: here for wali    Barriers / Limitations:  None    Method:  Brief focused, printed material and  reinforcement    General Topics:  Plan of care reviewed    Outcome: patient ambulatory. No complaints. Tolerated injection. States she will get her next apt from mychart. Aware it will be in 6 months. Shows understanding. Discharged in stable condition

## 2025-05-25 ENCOUNTER — OFFICE VISIT (OUTPATIENT)
Dept: FAMILY MEDICINE CLINIC | Facility: CLINIC | Age: 45
End: 2025-05-25
Payer: COMMERCIAL

## 2025-05-25 VITALS
SYSTOLIC BLOOD PRESSURE: 118 MMHG | HEIGHT: 67 IN | BODY MASS INDEX: 37.67 KG/M2 | HEART RATE: 106 BPM | DIASTOLIC BLOOD PRESSURE: 72 MMHG | WEIGHT: 240 LBS | OXYGEN SATURATION: 98 % | TEMPERATURE: 98 F | RESPIRATION RATE: 18 BRPM

## 2025-05-25 DIAGNOSIS — J01.00 ACUTE NON-RECURRENT MAXILLARY SINUSITIS: Primary | ICD-10-CM

## 2025-05-25 PROCEDURE — 3078F DIAST BP <80 MM HG: CPT | Performed by: NURSE PRACTITIONER

## 2025-05-25 PROCEDURE — 99213 OFFICE O/P EST LOW 20 MIN: CPT | Performed by: NURSE PRACTITIONER

## 2025-05-25 PROCEDURE — 3008F BODY MASS INDEX DOCD: CPT | Performed by: NURSE PRACTITIONER

## 2025-05-25 PROCEDURE — 3074F SYST BP LT 130 MM HG: CPT | Performed by: NURSE PRACTITIONER

## 2025-05-25 NOTE — PROGRESS NOTES
CHIEF COMPLAINT:     Chief Complaint   Patient presents with    Sinus Problem     X 1 month  Sx:nasal drainage/post nasal drip, cough, sinus pressure        HPI:   Estefany Retana is a 44 year old female who presents with c/o sinus pressure w/nasal congestion/runny nose, sinus pressure/pain, post nasal drainage, ear pressure/pain, muffled hearing, and popping/crackling w/swallowing x 1 month. Originally patient reports her symptoms started as a runny nose, thinking her allergies were acting up. She started taking Claritin d/t h/o CABG. Patient is unable to take decongestants or Flonase. Patient reports about 1 week about her symptoms increased.   Denies: sob, wheezing, fever, sore throat, inability to swallow, drooling, bad breath, change in voice, HA, n/v/d, cp, change in behavior/appetite/sleep, fatigue, body aches or exposure to anyone sick  OTC: Claritin  COVID: denies home testing     Current Medications[1]   Past Medical History[2]   Past Surgical History[3]      Short Social Hx on File[4]      REVIEW OF SYSTEMS:   GENERAL: unchanged appetite  SKIN: no rashes or abnormal skin lesions  HEENT: See HPI  LUNGS: denies shortness of breath or wheezing, See HPI  CARDIOVASCULAR: denies chest pain or palpitations   GI: denies N/V/C or abdominal pain  NEURO: Denies dizziness or weakness    EXAM:   /72   Pulse 106   Temp 98 °F (36.7 °C)   Resp 18   Ht 5' 7\" (1.702 m)   Wt 240 lb (108.9 kg)   LMP 02/01/2021 (Approximate)   SpO2 98%   BMI 37.59 kg/m²   GENERAL: well developed, well nourished,in no apparent distress  SKIN: no rashes,no suspicious lesions  HEAD: atraumatic, normocephalic.  + tenderness on palpation of maxillary sinuses  EYES: conjunctiva clear, EOM intact  EARS: TM's pearly/gray, no bulging, no retraction, no fluid, bony landmarks present. +Fullness bilaterally  NOSE: Nostrils patent, clear nasal discharge, nasal mucosa pink.   THROAT: Oral mucosa pink, moist. Posterior pharynx is pink. No  exudates. Tonsils 1/4. +Cobblestoning  NECK: Supple, non-tender  LUNGS: clear to auscultation bilaterally, no wheezes or rhonchi. Breathing is non labored.  CARDIO: RRR without murmur  EXTREMITIES: no cyanosis, clubbing or edema  LYMPH:  No head or neck lymphadenopathy.        ASSESSMENT AND PLAN:   Estefany Retana is a 44 year old female who presents with upper respiratory symptoms that are consistent with    ASSESSMENT:   Encounter Diagnosis   Name Primary?    Acute non-recurrent maxillary sinusitis Yes         PLAN:     Comfort care per pt instructions.   To follow up for any new or worsening symptoms.   To go to the ER for any severe symptoms such as difficulty breathing or chest pain.     Meds & Refills for this Visit:  Requested Prescriptions     Signed Prescriptions Disp Refills    amoxicillin clavulanate 875-125 MG Oral Tab 14 tablet 0     Sig: Take 1 tablet by mouth 2 (two) times daily for 7 days.       Risks, benefits, and side effects of medication explained and discussed.    Patient Instructions   Tylenol for fever/pain.  Encourage to take Flonase daily to help with fluid in ears and postnasal drip.  May use saline or nedi-pot for nasal irrigation with distilled water.  Increase oral intake of fluids.  Start taking an OTC Claritin.         When to take antibiotics:  >10 days without improvement  Onset of severe symptoms with high fever >102 and purulent drainage/facial pain lasting 3-4 consecutive days since onset  Worsening symptoms/signs for 3-4 days characterized by new onset fever, HA, or increased nasal discharge following a typical viral upper respiratory infection that lasted five to six days and were initially improving (\"double sickening\").  Encouraged to follow up with ENT.     The patient indicates understanding of these issues and agrees to the plan.  The patient is asked to return if sx's persist or worsen.         [1]   Current Outpatient Medications   Medication Sig Dispense Refill     inclisiran (LEQVIO) 284 MG/1.5ML Subcutaneous Solution Prefilled Syringe Inject 1.5 mL (284 mg total) into the skin every 6 (six) months.      predniSONE 20 MG Oral Tab 2.5 tab day 1-3, 2 tab day 4, 1.5 tab day 5, 1 tab day 6, 0.5 tab day 7. 13 tablet 0    ALPRAZolam 0.5 MG Oral Tab Take 0.5 tablets (0.25 mg total) by mouth 3 (three) times daily as needed for Anxiety. 30 tablet 2    busPIRone 15 MG Oral Tab Take 1 tablet (15 mg total) by mouth 2 (two) times daily. 180 tablet 1    venlafaxine  MG Oral Capsule SR 24 Hr Take 1 capsule (150 mg total) by mouth 2 (two) times daily. 180 capsule 1    Tirzepatide-Weight Management (ZEPBOUND) 10 MG/0.5ML Subcutaneous Solution Inject 10 mg into the skin once a week. 2 mL 5    levothyroxine 200 MCG Oral Tab Take 1 tablet (200 mcg total) by mouth before breakfast. 90 tablet 3    losartan 50 MG Oral Tab Take 0.5 tablets (25 mg total) by mouth daily.      metoprolol succinate ER 25 MG Oral Tablet 24 Hr 1 tablet (25 mg total) daily.      rosuvastatin 40 MG Oral Tab Take 1 tablet (40 mg total) by mouth daily.      aspirin 81 MG Oral Tab EC Take 1 tablet (81 mg total) by mouth daily. 90 tablet 3    clopidogrel 75 MG Oral Tab Take 1 tablet (75 mg total) by mouth daily. 30 tablet 11   [2]   Past Medical History:   Allergic rhinitis    Anxiety    Anxiety state    Coronary atherosclerosis    Depression    Disorder of thyroid    Endometriosis    Hysterectomy     Heart attack (HCC)    High blood pressure    High cholesterol    Hx of migraines    Hyperthyroidism    Hypothyroidism    Migraines    Obesity    Pre-eclampsia (HCC)    Unspecified essential hypertension    Unspecified hypothyroidism   [3]   Past Surgical History:  Procedure Laterality Date    Abdominal surgery  2011 and 2013    Hysterectomy     Anesth,vaginal procedures  2021    Suturing of Vaginal Cuff    Angioplasty (coronary)  2023          Cabg      Cabg      Cyst removal      Heart  surgery      Hysterectomy  03/02/2021    Dr. otero total laparoscopic hysterectomy, bilateral salpingectomy, postop cuff bleeding    Other surgical history  2000    Ovarian cyst   [4]   Social History  Socioeconomic History    Marital status:    Tobacco Use    Smoking status: Never    Smokeless tobacco: Never    Tobacco comments:     none at all   Vaping Use    Vaping status: Never Used   Substance and Sexual Activity    Alcohol use: Not Currently     Comment: very rare    Drug use: No    Sexual activity: Yes     Partners: Male     Birth control/protection: Hysterectomy   Other Topics Concern    Caffeine Concern No    Stress Concern No    Weight Concern Yes    Special Diet No    Exercise Yes    Seat Belt Yes     Social Drivers of Health     Transportation Needs: No Transportation Needs (5/11/2023)    Transportation Needs     Lack of Transportation: No

## 2025-05-25 NOTE — PATIENT INSTRUCTIONS
Tylenol for fever/pain.  Encourage to take Flonase daily to help with fluid in ears and postnasal drip.  May use saline or nedi-pot for nasal irrigation with distilled water.  Increase oral intake of fluids.  Start taking an OTC Claritin.         When to take antibiotics:  >10 days without improvement  Onset of severe symptoms with high fever >102 and purulent drainage/facial pain lasting 3-4 consecutive days since onset  Worsening symptoms/signs for 3-4 days characterized by new onset fever, HA, or increased nasal discharge following a typical viral upper respiratory infection that lasted five to six days and were initially improving (\"double sickening\").  Encouraged to follow up with ENT.

## 2025-05-28 ENCOUNTER — OFFICE VISIT (OUTPATIENT)
Dept: FAMILY MEDICINE CLINIC | Facility: CLINIC | Age: 45
End: 2025-05-28
Payer: COMMERCIAL

## 2025-05-28 VITALS
SYSTOLIC BLOOD PRESSURE: 118 MMHG | DIASTOLIC BLOOD PRESSURE: 78 MMHG | BODY MASS INDEX: 38 KG/M2 | RESPIRATION RATE: 16 BRPM | HEART RATE: 100 BPM | HEIGHT: 67 IN | OXYGEN SATURATION: 98 %

## 2025-05-28 DIAGNOSIS — J01.40 ACUTE NON-RECURRENT PANSINUSITIS: Primary | ICD-10-CM

## 2025-05-28 PROCEDURE — 3078F DIAST BP <80 MM HG: CPT | Performed by: PHYSICIAN ASSISTANT

## 2025-05-28 PROCEDURE — 3074F SYST BP LT 130 MM HG: CPT | Performed by: PHYSICIAN ASSISTANT

## 2025-05-28 PROCEDURE — 99214 OFFICE O/P EST MOD 30 MIN: CPT | Performed by: PHYSICIAN ASSISTANT

## 2025-05-28 RX ORDER — DOXYCYCLINE 100 MG/1
100 CAPSULE ORAL 2 TIMES DAILY
Qty: 14 CAPSULE | Refills: 0 | Status: SHIPPED | OUTPATIENT
Start: 2025-05-28

## 2025-05-28 RX ORDER — PREDNISONE 20 MG/1
TABLET ORAL
Qty: 13 TABLET | Refills: 0 | Status: SHIPPED | OUTPATIENT
Start: 2025-05-28

## 2025-05-28 RX ORDER — AZELASTINE 1 MG/ML
2 SPRAY, METERED NASAL 2 TIMES DAILY
Qty: 30 ML | Refills: 2 | Status: SHIPPED | OUTPATIENT
Start: 2025-05-28

## 2025-05-28 NOTE — PROGRESS NOTES
The following individual(s) verbally consented to be recorded using ambient AI listening technology and understand that they can each withdraw their consent to this listening technology at any point by asking the clinician to turn off or pause the recording:    Patient name: Estefany Retana  Additional names:

## 2025-05-28 NOTE — PROGRESS NOTES
Subjective:   Patient ID: Estefany Retana is a 44 year old female.    Sinusitis  Associated symptoms include congestion and sinus pressure. Pertinent negatives include no chills, ear pain, headaches or sore throat.     43 y/o female presents due to increased sinus pressure for the last week. She reports having a thick yellow mucus drainage, fatigue, an earache, and a wet cough for the last week. She also reports facial tenderness with palpation in the maxillary region. She states a month ago she started having increased nasal drainage. She denies fever, chills, or sore throat. She went to a walk in clinic last week and was prescribed Augmentin to resolve the sinus infection.She reports earache is now resolved but all other symptoms are still persistent. She reports taking Claritin daily with no improvement of symptoms. She is unable to take sudafed due to heart condition and has tried Flonase but reports it caused prolonged epistasis.     History/Other:   Review of Systems   Constitutional:  Positive for fatigue. Negative for chills and fever.   HENT:  Positive for congestion, rhinorrhea, sinus pressure and sinus pain. Negative for ear pain and sore throat.    Neurological:  Negative for headaches.     Current Medications[1]  Allergies:Allergies[2]    Objective:   Physical Exam  Vitals and nursing note reviewed.   Constitutional:       Appearance: She is not ill-appearing or toxic-appearing.   HENT:      Head: Normocephalic and atraumatic.      Right Ear: Ear canal and external ear normal. No tenderness. A middle ear effusion is present.      Left Ear: Ear canal and external ear normal. No tenderness. A middle ear effusion is present.      Nose: Mucosal edema and rhinorrhea present. No congestion.      Right Turbinates: Swollen.      Left Turbinates: Swollen.      Right Sinus: Maxillary sinus tenderness present. No frontal sinus tenderness.      Left Sinus: Maxillary sinus tenderness present. No frontal sinus  tenderness.      Mouth/Throat:      Pharynx: Postnasal drip present.   Cardiovascular:      Rate and Rhythm: Normal rate and regular rhythm.      Heart sounds: Normal heart sounds, S1 normal and S2 normal.   Pulmonary:      Effort: Pulmonary effort is normal.      Breath sounds: Normal breath sounds.   Abdominal:      General: Bowel sounds are normal.      Palpations: Abdomen is soft.      Tenderness: There is no abdominal tenderness.   Musculoskeletal:      Cervical back: Normal range of motion and neck supple.   Lymphadenopathy:      Cervical: No cervical adenopathy.   Neurological:      Mental Status: She is alert.         Assessment & Plan:   Diagnosis is Sinusitis.   The patient was prescribed doxycycline 100mg BID for 10 days with continuous monitoring of symptoms. She was also advised to rotate Allegra, Claritin, and Xyzal to resolve symptoms or switch to one as well. She was also prescribed azelastine nasal spray to help resolve symptoms. Start prednisone prescription if symptoms do not resolve in 3-4 days. Informed to follow up for any worsening symptoms.     Amanda CASEY       [1]   Current Outpatient Medications   Medication Sig Dispense Refill    amoxicillin clavulanate 875-125 MG Oral Tab Take 1 tablet by mouth 2 (two) times daily for 7 days. 14 tablet 0    inclisiran (LEQVIO) 284 MG/1.5ML Subcutaneous Solution Prefilled Syringe Inject 1.5 mL (284 mg total) into the skin every 6 (six) months.      ALPRAZolam 0.5 MG Oral Tab Take 0.5 tablets (0.25 mg total) by mouth 3 (three) times daily as needed for Anxiety. 30 tablet 2    busPIRone 15 MG Oral Tab Take 1 tablet (15 mg total) by mouth 2 (two) times daily. 180 tablet 1    venlafaxine  MG Oral Capsule SR 24 Hr Take 1 capsule (150 mg total) by mouth 2 (two) times daily. 180 capsule 1    Tirzepatide-Weight Management (ZEPBOUND) 10 MG/0.5ML Subcutaneous Solution Inject 10 mg into the skin once a week. 2 mL 5    levothyroxine 200 MCG Oral Tab  Take 1 tablet (200 mcg total) by mouth before breakfast. 90 tablet 3    losartan 50 MG Oral Tab Take 0.5 tablets (25 mg total) by mouth daily.      metoprolol succinate ER 25 MG Oral Tablet 24 Hr 1 tablet (25 mg total) daily.      rosuvastatin 40 MG Oral Tab Take 1 tablet (40 mg total) by mouth daily.      aspirin 81 MG Oral Tab EC Take 1 tablet (81 mg total) by mouth daily. 90 tablet 3    clopidogrel 75 MG Oral Tab Take 1 tablet (75 mg total) by mouth daily. 30 tablet 11   [2]   Allergies  Allergen Reactions    Levofloxacin DIARRHEA

## 2025-05-31 NOTE — PROGRESS NOTES
I have reviewed the note written by the PA student.  I personally saw and evaluated the patient, confirmed the findings, and agree with the assessment and plan as documented.

## (undated) DIAGNOSIS — I10 ESSENTIAL HYPERTENSION: ICD-10-CM

## (undated) DEVICE — SUTURE WIRE DOUBLE STERNOTOMY

## (undated) DEVICE — VISUALIZATION SYSTEM: Brand: CLEARIFY

## (undated) DEVICE — CELL SAVER RESERVOIR

## (undated) DEVICE — TROCAR: Brand: KII® SLEEVE

## (undated) DEVICE — PLUMEPORT ACTIV LAPAROSCOPIC SMOKE FILTRATION DEVICE: Brand: PLUMEPORT ACTIVE

## (undated) DEVICE — KENDALL SCD EXPRESS SLEEVES, KNEE LENGTH, MEDIUM: Brand: KENDALL SCD

## (undated) DEVICE — ELECTRODE EDGE NEEDLE 2.84IN

## (undated) DEVICE — OPEN HEART: Brand: MEDLINE INDUSTRIES, INC.

## (undated) DEVICE — STERILE POLYISOPRENE POWDER-FREE SURGICAL GLOVES: Brand: PROTEXIS

## (undated) DEVICE — VIOLET BRAIDED (POLYGLACTIN 910), SYNTHETIC ABSORBABLE SUTURE: Brand: COATED VICRYL

## (undated) DEVICE — SUT VICRYL 2-0 CT-1 J945H

## (undated) DEVICE — SYRINGE 30ML LL TIP

## (undated) DEVICE — SUT SILK 2 TIES SA8H

## (undated) DEVICE — ABSORBABLE HEMOSTAT (OXIDIZED REGENERATED CELLULOSE, U.S.P.): Brand: SURGICEL

## (undated) DEVICE — SUT PROLENE 8-0 BV130-5 8730H

## (undated) DEVICE — DISPOSABLE, UNIPOLAR, BOVIE PLUG TO RIGHT ANGLE SINGLE PIN: Brand: QUANTUM

## (undated) DEVICE — DRIVER DRILL ZDRIVE

## (undated) DEVICE — SUT PROLENE 7-0 CC M8705

## (undated) DEVICE — 3M(TM) TEGADERM(TM) TRANSPARENT FILM DRESSING FRAME STYLE 1628: Brand: 3M™ TEGADERM™

## (undated) DEVICE — SOLUTION SURG DURAPREP 26ML

## (undated) DEVICE — FIXED CORE WIRE GUIDE SAFE-T-J, CURVED: Brand: COOK

## (undated) DEVICE — SUT POLYDEK 2-0 ME-2 69-414

## (undated) DEVICE — SOL  .9 1000ML BTL

## (undated) DEVICE — GYN CDS: Brand: MEDLINE INDUSTRIES, INC.

## (undated) DEVICE — GAUZE,PACKING STRIP,IODOFORM,1/2"X5YD,ST: Brand: CURAD

## (undated) DEVICE — ELECTRODE MEGADYNE BLADE 2.5IN

## (undated) DEVICE — TRAP MUCUS 20ML

## (undated) DEVICE — PROXIMATE RH ROTATING HEAD SKIN STAPLERS (35 WIDE) CONTAINS 35 STAINLESS STEEL STAPLES: Brand: PROXIMATE

## (undated) DEVICE — CONTAINER CELL SAVER 600ML

## (undated) DEVICE — SUT PROLENE 6-0 C-1 M8718

## (undated) DEVICE — TIBURON DRAPE TOWELS: Brand: CONVERTORS

## (undated) DEVICE — GYN LAP CDS: Brand: MEDLINE INDUSTRIES, INC.

## (undated) DEVICE — CATH IV ANGIOCATH 16G 5.25IN

## (undated) DEVICE — Device: Brand: VIRTUOSAPH PLUS WITH RADIAL INDICATION

## (undated) DEVICE — SOL NACL IRRIG 0.9% 1000ML BTL

## (undated) DEVICE — [HIGH FLOW INSUFFLATOR,  DO NOT USE IF PACKAGE IS DAMAGED,  KEEP DRY,  KEEP AWAY FROM SUNLIGHT,  PROTECT FROM HEAT AND RADIOACTIVE SOURCES.]: Brand: PNEUMOSURE

## (undated) DEVICE — SUTURE CHROMIC GUT 3-0 CT-2

## (undated) DEVICE — MEDI-VAC NON-CONDUCTIVE SUCTION TUBING: Brand: CARDINAL HEALTH

## (undated) DEVICE — GOWN,SIRUS,FAB REINF,RAGLAN,XL,STERILE: Brand: MEDLINE

## (undated) DEVICE — 3M™ IOBAN™ 2 ANTIMICROBIAL INCISE DRAPE 6651EZ: Brand: IOBAN™ 2

## (undated) DEVICE — CATH IV ANGIOCATH 16G 1.88IN

## (undated) DEVICE — POOLE SUCTION HANDLE: Brand: CARDINAL HEALTH

## (undated) DEVICE — SUT PROLENE 5-0 C-1 8725H

## (undated) DEVICE — LEAD PACING STREAMLIN UNIPOLAR

## (undated) DEVICE — PINNACLE INTRODUCER SHEATH: Brand: PINNACLE

## (undated) DEVICE — Device: Brand: INTELLICART™

## (undated) DEVICE — SUTURE MONOCRYL 4-0 PS-2

## (undated) DEVICE — TROCAR: Brand: KII FIOS FIRST ENTRY

## (undated) DEVICE — MEDI-VAC SUCTION FINE CAPACITY: Brand: CARDINAL HEALTH

## (undated) DEVICE — INSUFFLATION NEEDLE TO ESTABLISH PNEUMOPERITONEUM.: Brand: INSUFFLATION NEEDLE

## (undated) DEVICE — GAUZE SPONGES,12 PLY: Brand: CURITY

## (undated) DEVICE — 3M™ TEGADERM™ TRANSPARENT FILM DRESSING, 1626W, 4 IN X 4-3/4 IN (10 CM X 12 CM), 50 EACH/CARTON, 4 CARTON/CASE: Brand: 3M™ TEGADERM™

## (undated) DEVICE — GLOVE SURG TRIUMPH SZ 8

## (undated) DEVICE — GOWN,SIRUS,FABRIC-REINFORCED,X-LARGE: Brand: MEDLINE

## (undated) DEVICE — SUT SILK 0 FSL 678G

## (undated) DEVICE — VCARE MEDIUM, UTERINE MANIPULATOR, VAGINAL-CERVICAL-AHLUWALIA'S-RETRACTOR-ELEVATOR: Brand: VCARE

## (undated) DEVICE — SUT VICRYL 3-0 PS-1 J936H

## (undated) DEVICE — SOL LACT RINGERS 1000ML

## (undated) DEVICE — 40580 - THE PINK PAD - ADVANCED TRENDELENBURG POSITIONING KIT: Brand: 40580 - THE PINK PAD - ADVANCED TRENDELENBURG POSITIONING KIT

## (undated) DEVICE — CV PACK-LF: Brand: MEDLINE INDUSTRIES, INC.

## (undated) NOTE — LETTER
09/29/17        [address] [place] [address] Meghan. [id number]      Dear Hairs Dinh,    [facility] records indicate that you have outstanding lab work and or testing that was ordered for you and has not yet been completed:          Vitamin D, 25-

## (undated) NOTE — ED AVS SNAPSHOT
Snehal Atilio   MRN: [id number]    Department:  THE [facility] Emergency Department in [place]   Date of Visit:  3/6/2018           Disclosure     Insurance plans vary and the physician(s) referred by the ER may not be covered by your plan.  Please con tell this physician (or your personal doctor if your instructions are to return to your personal doctor) about any new or lasting problems. The primary care or specialist physician will see patients referred from the [facility] Emergency Department.  Chriss Hendrickson

## (undated) NOTE — Clinical Note
I saw Suzy Chester in the [facility] in [facility] today for barotrauma to left TM,  she was treated with augmentin and cortisporin. Flonase was encouraged along with antihistamine/decongestant. Pleasant Lias will follow up with you for TM recheck in 1 week, sooner

## (undated) NOTE — LETTER
[facility] Testing Department  Phone: [phone]  OUTSIDE TESTING RESULT REQUEST      TO:  Dr. Sofía Iglesias Today's Date: 2/12/21    FAX #: [phone]     IMPORTANT: FOR YOUR IMMEDIATE ATTENTION  Please FAX all test results listed below to: [phone]

## (undated) NOTE — LETTER
4/21/2021              Kerryloree Morrisfoot        [phone]         To Whom It May Concern,  Deneen Tsai may return to work tomorrow with no restrictions. Please contact me if you have any questions.     Sincerely,

## (undated) NOTE — Clinical Note
ANISA. Pt completed TCM. Patient has upcoming TCM/HFU appointment scheduled on 5/15/23. TE to PCP office re: changing visit type. Thank you.

## (undated) NOTE — LETTER
[address]  [address] [facility] [address] [facility]  Authorization for Surgical Operation and Procedure     Date:___________                                                                                                         Time:__________ potential risks that can occur: fever and allergic reactions, hemolytic reactions, transmission of diseases such as Hepatitis, AIDS and Cytomegalovirus (CMV) and fluid overload.   In the event that I wish to have an autologous transfusion of my own blood, o will determine when the applicable recovery period ends for purposes of reinstating the DNAR order.   10. Patients having a sterilization procedure: I understand that if the procedure is successful the results will be permanent and it will therefore be impo anesthesiologist (anesthesia doctor) to give me medicine and do additional procedures as necessary.  Some examples are: Starting or using an “IV” to give me medicine, fluids or blood during my procedure, and having a breathing tube placed to help me breathe “epidural”, & “nerve blocks”): I understand that rare but potential complications include headache, bleeding, infection, seizure, irregular heart rhythms, and nerve injury.     I can change my mind about having anesthesia services at any time before I get

## (undated) NOTE — ED AVS SNAPSHOT
Rosio Diez   MRN: [id number]    Department:  [facility] Emergency Department   Date of Visit:  3/10/2018           Disclosure     Insurance plans vary and the physician(s) referred by the ER may not be covered by your plan.  Please contact tell this physician (or your personal doctor if your instructions are to return to your personal doctor) about any new or lasting problems. The primary care or specialist physician will see patients referred from the [facility] Emergency Department.  Salvadore Skiff

## (undated) NOTE — LETTER
[address]  [place], [address] [facility]  Authorization for Surgical Operation and Procedure   Date:______4/25/2023_____                                                                                                         Time:_______0930___  I hereby authorize* Dr Flory Bower, my physician and his/her assistants (if applicable), which may include medical students, residents, and/or fellows, to perform the following surgical operation/ procedure and administer such anesthesia as may be determined necessary by my physician:  Cardiac catheterization, left ventricular cineangiography, bilateral selective coronary arteriography and/or right heart catheterization. Possible percutaneous transluminal coronary angioplasty, coronary atherectomy, coronary stent, intracoronary thrombolytic therapy on [facility]   2. I recognize that during the surgical operation/procedure, unforeseen conditions may necessitate additional or different procedures than those listed above. I, therefore, further authorize and request that the above-named surgeon, assistants, or designees perform such procedures as are, in their judgment, necessary and desirable. 3.   My surgeon/physician has discussed prior to my surgery the potential benefits, risks and side effects of this procedure; the likelihood of achieving goals; and potential problems that might occur during recuperation. They also discussed reasonable alternatives to the procedure, including risks, benefits, and side effects related to the alternatives and risks related to not receiving this procedure. I have had all my questions answered and I acknowledge that no guarantee has been made as to the result that may be obtained.     4.   Should the need arise during my operation or immediate post-operative period, I also consent to the administration of blood and/or blood products. Further, I understand that despite careful testing and screening of blood or blood products by collecting agencies, I may still be subject to ill effects as a result of receiving a blood transfusion and/or blood products. The following are some, but not all, of the potential risks that can occur: fever and allergic reactions, hemolytic reactions, transmission of diseases such as Hepatitis, AIDS and Cytomegalovirus (CMV) and fluid overload. In the event that I wish to have an autologous transfusion of my own blood, or a directed donor transfusion. I will discuss this with my physician. 5.   I authorize the use of any specimen, organs, tissues, body parts or foreign objects that may be removed from my body during the operation/procedure for diagnosis, research or teaching purposes and their subsequent disposal by hospital authorities. I also authorize the release of specimen test results and/or written reports to my treating physician on the hospital medical staff or other referring or consulting physicians involved in my care, at the discretion of the Pathologist or my treating physician. 6.   I consent to the photographing or videotaping of the operations or procedures to be performed, including appropriate portions of my body for medical, scientific, or educational purposes, provided my identity is not revealed by the pictures or by descriptive texts accompanying them. If the procedure has been photographed/videotaped, the surgeon will obtain the original picture, image, videotape or CD. The hospital will not be responsible for storage, release or maintenance of the picture, image, tape or CD.    7.   I consent to the presence of a  or observers in the operating room as deemed necessary by my physician or their designees.     8.   I recognize that in the event my procedure results in extended X-Ray/fluoroscopy time, I may develop a skin reaction. 9. If I have a Do Not Attempt Resuscitation (DNAR) order in place, that status will be suspended while in the operating room, procedural suite, and during the recovery period unless otherwise explicitly stated by me (or a person authorized to consent on my behalf). The surgeon or my attending physician will determine when the applicable recovery period ends for purposes of reinstating the DNAR order. 10. Patients having a sterilization procedure: I understand that if the procedure is successful the results will be permanent and it will therefore be impossible for me to inseminate, conceive, or bear children. I also understand that the procedure is intended to result in sterility, although the result has not been guaranteed. 11. I acknowledge that my physician has explained sedation/analgesia administration to me including the risk and benefits I consent to the administration of sedation/analgesia as may be necessary or desirable in the judgment of my physician.     I CERTIFY THAT I HAVE READ AND FULLY UNDERSTAND THE ABOVE CONSENT TO OPERATION and/or OTHER PROCEDURE.      _________________________________________  __________________________________  Signature of Patient     Signature of Responsible Person         ___________________________________         Printed Name of Responsible Person           _________________________________                 Relationship to Patient  _________________________________________  ______________________________  Signature of Witness          Date  Time    Patient Name: Nora Mendoza     : 1980                 Printed: 2023     Medical Record #: [id number]                     Page 1 of 1

## (undated) NOTE — LETTER
Beryl Alejandro [address] [place] [address]  Authorization for Surgical Operation and Procedure   Date:___________                                                                                                         Time:__________  I hereby authorize* Dr Mounika Liriano, my physician and his/her assistants (if applicable), which may include medical students, residents, and/or fellows, to perform the following surgical operation/ procedure and administer such anesthesia as may be determined necessary by my physician:  Cardiac catheterization, left ventricular cineangiography, bilateral selective coronary arteriography and/or right heart catheterization. Possible percutaneous transluminal coronary angioplasty, coronary atherectomy, coronary stent, intracoronary thrombolytic therapy. on Carolynn Fennel   2. I recognize that during the surgical operation/procedure, unforeseen conditions may necessitate additional or different procedures than those listed above. I, therefore, further authorize and request that the above-named surgeon, assistants, or designees perform such procedures as are, in their judgment, necessary and desirable. 3.   My surgeon/physician has discussed prior to my surgery the potential benefits, risks and side effects of this procedure; the likelihood of achieving goals; and potential problems that might occur during recuperation. They also discussed reasonable alternatives to the procedure, including risks, benefits, and side effects related to the alternatives and risks related to not receiving this procedure. I have had all my questions answered and I acknowledge that no guarantee has been made as to the result that may be obtained.     4.   Should the need arise during my operation or immediate post-operative period, I also consent to the administration of blood and/or blood products. Further, I understand that despite careful testing and screening of blood or blood products by collecting agencies, I may still be subject to ill effects as a result of receiving a blood transfusion and/or blood products. The following are some, but not all, of the potential risks that can occur: fever and allergic reactions, hemolytic reactions, transmission of diseases such as Hepatitis, AIDS and Cytomegalovirus (CMV) and fluid overload. In the event that I wish to have an autologous transfusion of my own blood, or a directed donor transfusion. I will discuss this with my physician. 5.   I authorize the use of any specimen, organs, tissues, body parts or foreign objects that may be removed from my body during the operation/procedure for diagnosis, research or teaching purposes and their subsequent disposal by hospital authorities. I also authorize the release of specimen test results and/or written reports to my treating physician on the hospital medical staff or other referring or consulting physicians involved in my care, at the discretion of the Pathologist or my treating physician. 6.   I consent to the photographing or videotaping of the operations or procedures to be performed, including appropriate portions of my body for medical, scientific, or educational purposes, provided my identity is not revealed by the pictures or by descriptive texts accompanying them. If the procedure has been photographed/videotaped, the surgeon will obtain the original picture, image, videotape or CD. The hospital will not be responsible for storage, release or maintenance of the picture, image, tape or CD.    7.   I consent to the presence of a  or observers in the operating room as deemed necessary by my physician or their designees. 8.   I recognize that in the event my procedure results in extended X-Ray/fluoroscopy time, I may develop a skin reaction.     9. If I have a Do Not Attempt Resuscitation (DNAR) order in place, that status will be suspended while in the operating room, procedural suite, and during the recovery period unless otherwise explicitly stated by me (or a person authorized to consent on my behalf). The surgeon or my attending physician will determine when the applicable recovery period ends for purposes of reinstating the DNAR order. 10. Patients having a sterilization procedure: I understand that if the procedure is successful the results will be permanent and it will therefore be impossible for me to inseminate, conceive, or bear children. I also understand that the procedure is intended to result in sterility, although the result has not been guaranteed. 11. I acknowledge that my physician has explained sedation/analgesia administration to me including the risk and benefits I consent to the administration of sedation/analgesia as may be necessary or desirable in the judgment of my physician.     I CERTIFY THAT I HAVE READ AND FULLY UNDERSTAND THE ABOVE CONSENT TO OPERATION and/or OTHER PROCEDURE.      _________________________________________  __________________________________  Signature of Patient     Signature of Responsible Person         ___________________________________         Printed Name of Responsible Person           _________________________________                 Relationship to Patient  _________________________________________  ______________________________  Signature of Witness          Date  Time    Patient Name: David Ridley     : 1980                 Printed: 2023     Medical Record #: [id number]                     Page 1 of 1

## (undated) NOTE — LETTER
[facility] FOR BLOOD OR BLOOD COMPONENTS      In the course of your treatment, it may become necessary to administer a transfusion of blood or blood components. This form provides basic information concerning this procedure and, if signed by you, authorizes its performance by qualified medical personnel. DESCRIPTION OF PROCEDURE:  Blood is introduced into one of your veins, commonly in the arm, using a sterilized disposable needle. The amount of blood transfused, and whether the transfusion will be of blood or blood components is a judgment the physician will make based on your particular needs. RISKS:  The transfusion is a common procedure of low risk. MINOR AND TEMPORARY REACTIONS ARE NOT UNCOMMON, including a slight bruise, swelling or local reaction in the area where the needle pierces your skin, or a non-serious reaction to the transfused material itself, including headache, fever or a mild skin reaction, such as rash. Serious reactions are possible, though very unlikely and include severe allergic reaction (shock)  and destruction (hemolysis) of transfused blood cells. Infectious diseases which are known to be transmitted by blood transfusion include CERTAIN TYPES OF VIRAL HEPATITIS, a viral infection of the liver, HUMAN IMMUNODEFICIENCY VIRUS (HIV-1,2) infection, a viral infection known to cause ACQUIRED IMMUNODEFICIENCY SYNDROME (AIDS) AS WELL AS CERTAIN OTHER BACTERIAL, VIRAL AND PARASITIC DISEASES. While a minimal risk of acquiring an infectious disease from transfused blood exists, in accordance with Federal and State law all due care has been taken in donor selection and testing to avoid transmission of disease. ALTERNATIVES:  If loss of blood poses serious threats in the course of your treatment, THERE IS NO EFFECTIVE ALTERNATIVE TO BLOOD TRANSFUSION.  However, if you have any further questions on this matter, your physician will fully explain the alternatives to you if it has not already been done. Ivonne Dao, have read/had read to me the above. I understand the matters bearing on the decision whether or not to authorize a transfusion of blood or blood components. I have no questions which have not been answered to my full satisfaction. I hereby consent to such transfusion as  my physician may deem necessary or advisable in the course of my treatment.        _______________   __________________________________________________  Date     Signature of Patient, Parent or Legal Guardian      ([place])      __________________________________________  Witness to Signature (title or relationship to patient)    Patient Name: Fabby Sutherland     : 1980                 Printed:  May 4, 2023     Medical Record #: [id number]                    Page 1 of 1

## (undated) NOTE — Clinical Note
TCM call completed. A TE was sent to the office for an appointment request.  A condition update was also included on the TE. Thank you.

## (undated) NOTE — LETTER
02/21/19        Veda Bravo  [address] [address]      Dear Delicia,    [facility] records indicate that you have outstanding lab work and or testing that was ordered for you and has not yet been completed:  Orders Placed This Encou

## (undated) NOTE — LETTER
03/19/18        [address] [address]      Dear Chano Kerr,    [facility] records indicate that you have outstanding lab work and or testing that was ordered for you and has not yet been completed:          JATINDER Cain

## (undated) NOTE — MR AVS SNAPSHOT
[facility] [address]  [facility]  [phone]               Thank you for choosing us for your health care visit with Mary Olivares MD.  We are glad to serve you and happy to provide you with [facility] Norethindrone Acet-Ethinyl Est 1-20 MG-MCG Tabs   Take 1 tablet by mouth daily.    Commonly known as:  MICROGESTIN 1/20           Venlafaxine HCl ER 75 MG Cp24   TAKE 3 CAPSULES BY MOUTH DAILY   Commonly known as:  EFFEXOR-XR                Where to Get Yo [E03.1], Laboratory examination ordered as part of a routine general medical examination [Z00.00]                 Referral Details     Referred By    Referred To    Agueda Fay, [address]   Phone:  [phone], you can view your past visit information in [facility] by going to Visits < Visit Summaries. [facility] questions? Call [phone] for help. [facility] is NOT to be used for urgent needs. For medical emergencies, dial 911.         Educational Inform

## (undated) NOTE — LETTER
Katt Jacinto M.D., F.A.C.S. Sylvia Lester M.D., F.A.C.S. Alba Samuel M.D., Venda Gaucher. RHETT Marques M.D., F.A.C.SJohn Elizabeth. Bipin Kent M.D., F.A.C.S. Jacob Galeazzi, M.D. JENNIFER Rodgers M.D., F.A.C.SJohn Cornejo. Fausto Holguin M.D., F.A.C.S. Severiano Marvel, M.D., F.A.C.S. Gina Dugan M.D., F.A.C.S. Yonis Mendez M.D. F.A.C.S. Georgiana Beltran. Joseluis Velez M.D., F.A.C.S. Valentin Kevin M.D., F.A.C.S. Alondra Cassidy M.D., F.A.C.S., F.A.C.CJohn Schumacher M.D., F.A.C.SJohn Perry M.D., F.A.C.S. Kristina Enamorado M.D., F.A.C.SJohn Sommers. Amy Merlos M.D., F.A.C.S. LUCY Tadeo M.D., Venda Gaucher. C.S  Shwetha Casas M.D. Elvin Pop M.D., F.A.C.S. Keven Koo. Josh Toscano M.D., F.A.C.S. Josesito Srinivasan M.D. Hannah Stout M.D.  Stephanie Harmon M.D. PhD.  Rusty Ya M.D. Francisco Sargent M.D. F A LEW Gr M.D. Marjorie Vang M.D. Joi Hagen M.D. Maryann Seip, M.D.   Barclay Cooks, D.O., ZAHIRA.A.CJohnSJohn Elias M.D., F.A.C.S. Eden Reyna M.D. Welcome to [facility] As you contemplate possible surgical treatment, it is very important to us that you understand fully what is being discussed, that all of your questions have been answered, and that your options for treatment have been fully explained. To that end, on the following page we will ask you some questions to make certain that you understand everything which has been explained to you. Included in this understanding is that there are both surgical and nonsurgical treatments available for you, that you have options regarding where your care is given, and what doctors are involved in your care. Included in these options would, of course, be the option to elect for no treatment whatsoever.  We especially want to be sure that you have had a chance to have all of your questions and concerns answered. If there are any issues which have not been adequately addressed, we ask you to bring them forward so that we can thoroughly address them. A patient who is fully informed and understands their condition and options for treatment, as well as potential adverse effects of treatment, is going to be a patient who receives the most benefit out of care rendered. Our goal in addition to providing excellent surgical care is to provide the necessary information to you and your family in order to make decisions which are appropriate relative to your own care. Please take the time necessary to read and answer the questions on the next page. Again, if you have any questions, bring them forward and we will certainly address them. Sincerely,    [facility].    _______________________  ____________________________________  Date:                                             Patient Signature  ________________________  Sapna Pagan  Witness Consent Form         Revised: 2015  Patient Name: Sapna Pagan     : 1980                 Printed: 6/15/13 9:43 AM     Medical Record #: [id number]                Page: 1 of  2        [facility] Supplemental Consent Form    A [facility] ([facility]) surgeon has met with me and explained the matter of my illness, and what treatments might be available to improve my condition. As a result of that conversation, I understand the following:    A [facility] surgeon met with me and explained, in detail, the nature of my condition for which surgery is being contemplated. The procedure to be performed  Is: CORONARY ARTERY BYPASS GRAFT            Yes _____ No _____    A [facility] surgeon has explained to me that there are alternatives to surgery which might include no surgery, medical therapy, or interventional treatment, among other options and the risks and benefits of the different treatment options:     Yes _____ No _____    A [facility] surgeon as explained to me that if I should so desire, he/she is willing to explain my case and the surgical and non-surgical options to family members: Yes _____ No _____    A [facility] surgeon has answered all of my questions regarding the topics we have discussed. I have been invited to ask more questions:  Yes _____ No _____    A [facility] surgeon has explained to me that if I seek other options or wish treatment at another facility in [place] or [place], or anywhere in the [place], that his/her office will assist me in making such accommodations:   Yes _____ No _____    A [facility] surgeon has explained to me, that death, risk of bleeding, stroke, multi-organ failure, heart attack or other complications are risks for the proposed surgical procedure: Yes _____ No _____    A [facility] surgeon has explained to me that I have the right to cancel or postpone the surgery at any time prior to the start of surgery: Yes _____ No _____    The nature and options for treatment for my condition have been explained to me, in detail, by a [facility] surgeon and all questions have been answered to my satisfaction. I understand that I am not required to undergo surgery, and further, that if I so desire, I could have surgery accomplished by another surgeon or at another institution. I understand and accept that which has been explained to me. I am able to make my decisions knowingly and willfully based on the data.    ______________________   __________________________________  Date       Patient Signature  ______________________________ Glorine Files  Witness Consent Form   Patient Name: Niru Wetzel     DATB: 11/5/1980                 Medical Record #: [id number]    Page: 2 of 2        Printed:  May 4, 2023

## (undated) NOTE — LETTER
[facility] Testing Department  Phone: [phone]  OUTSIDE TESTING RESULT REQUEST      TO: Dr. Sangeeta Rdz            Today's Date: 2/12/21    FAX #: [phone]     IMPORTANT: FOR YOUR IMMEDIATE ATTENTION  Please FAX all test results listed kaia

## (undated) NOTE — LETTER
Jace Walker M.D., F.A.C.S. Benigno Watts M.D., F.A.C.S. Maame Aj M.D., Ijeoma Jones M.D., F.A.C.SJohn [place]. Indio Payan M.D., F.A.C.S. Tono Rowe M.D. JENNIFER Bolaños Cea, M.D., F.A.C.S. Sunny Ledesma. Brenda Tellez M.D., F.A.C.S. Ava Ramires M.D., F.A.C.S. Shani Kunz M.D., F.A.C.S. Jone Jacobs M.D. F.A.C.S. Manual Roman. Josué King M.D., F.A.C.S. Valentin Aviles M.D., F.A.C.S. Eddie Hudson M.D., F.A.C.S., F.A.C.CJohn Belle M.D., F.A.C.SJohn Gardner M.D., F.A.C.SJohn Roberts M.D., F.A.C.SJohn Moran. Cindy Sandoval M.D., F.A.C.S. LUCY Garvey M.D., Ijeoma HACKETT.ANYI Flores M.D. Everton Mittal M.D., F.A.C.S. Karl Oneill. Lucas Ibarra M.D., F.A.C.SJohn Bateman M.D. Donovan Chakraborty M.D.  Sumanth Brasher M.D. PhD.  Tonya Lowery M.D. Tiara Brown M.D. F A C SJohn Mascorro. Kia Hartley M.D. Kanchan Angela M.D. Judy Villagomez M.D. Phillip Feng M.D.   Navin Hartman D.O., F.A.C.S. Amanda Harper M.D., F.A.C.S. Vanessa Schulte M.D. Welcome to [facility] As you contemplate possible surgical treatment, it is very important to us that you understand fully what is being discussed, that all of your questions have been answered, and that your options for treatment have been fully explained. To that end, on the following page we will ask you some questions to make certain that you understand everything which has been explained to you. Included in this understanding is that there are both surgical and nonsurgical treatments available for you, that you have options regarding where your care is given, and what doctors are involved in your care. Included in these options would, of course, be the option to elect for no treatment whatsoever.  We especially want to be sure that you have had a chance to have all of your questions and concerns answered. If there are any issues which have not been adequately addressed, we ask you to bring them forward so that we can thoroughly address them. A patient who is fully informed and understands their condition and options for treatment, as well as potential adverse effects of treatment, is going to be a patient who receives the most benefit out of care rendered. Our goal in addition to providing excellent surgical care is to provide the necessary information to you and your family in order to make decisions which are appropriate relative to your own care. Please take the time necessary to read and answer the questions on the next page. Again, if you have any questions, bring them forward and we will certainly address them. Sincerely,    [facility].    _______________________  ____________________________________  Date:                                             Patient Signature  ________________________  Lauren Swain  Witness Consent Form         Revised: 2015  Patient Name: Lauren Swain     : 1980                 Printed: 6/15/13 9:43 AM     Medical Record #: [id number]                Page: 1 of  2        [facility] Supplemental Consent Form    A [facility] ([facility]) surgeon has met with me and explained the matter of my illness, and what treatments might be available to improve my condition. As a result of that conversation, I understand the following:    A [facility] surgeon met with me and explained, in detail, the nature of my condition for which surgery is being contemplated. The procedure to be performed  Is: REMOVAL OF STERNAL HARDWARE            Yes _____ No _____    A [facility] surgeon has explained to me that there are alternatives to surgery which might include no surgery, medical therapy, or interventional treatment, among other options and the risks and benefits of the different treatment options:     Yes _____ No _____    A [facility] surgeon as explained to me that if I should so desire, he/she is willing to explain my case and the surgical and non-surgical options to family members: Yes _____ No _____    A [facility] surgeon has answered all of my questions regarding the topics we have discussed. I have been invited to ask more questions:  Yes _____ No _____    A [facility] surgeon has explained to me that if I seek other options or wish treatment at another facility in [place] or [place], or anywhere in the [place], that his/her office will assist me in making such accommodations:   Yes _____ No _____    A [facility] surgeon has explained to me, that death, risk of bleeding, stroke, multi-organ failure, heart attack or other complications are risks for the proposed surgical procedure: Yes _____ No _____    A [facility] surgeon has explained to me that I have the right to cancel or postpone the surgery at any time prior to the start of surgery: Yes _____ No _____    The nature and options for treatment for my condition have been explained to me, in detail, by a [facility] surgeon and all questions have been answered to my satisfaction. I understand that I am not required to undergo surgery, and further, that if I so desire, I could have surgery accomplished by another surgeon or at another institution. I understand and accept that which has been explained to me.  I am able to make my decisions knowingly and willfully based on the data.    ______________________   __________________________________  Date       Patient Signature  ______________________________ [facility] [place]  Witness Consent Form   Patient Name: Jey Parada     DATB: 11/5/1980                 Medical Record #: [id number]    Page: 2 of 2        Printed: July 25, 2023

## (undated) NOTE — LETTER
Re:  Silverio Koyanagi, :  1980      Dear Dr. Claduette Ruff,    I am writing in regard to Patrick Chuvishal who is scheduled for total laparoscopic hysterectomy with bilateral salpingectomy and cytoscopy on 2021.   I would like her to h

## (undated) NOTE — LETTER
Monica Jeffers M.D., F.A.C.S. Valentin Tellez M.D., F.A.C.S. Nerissa Lomas M.D., Lesia Mock. C. Larraine Dakin, M.D., F.A.C.SJohn Reed. Matteo Boston M.D., F.A.C.S. LUCY Mejia M. Grover Gully A.D. Elliot Pries, M.D., F.A.C.S. Verónica Hernandez. Rios Marcus M.D., F.A.C.S. Debi Rea M.D., F.A.C.S. Franny Cordero M.D., F.A.C.SJohn Chu M.D. F.A.C.SJohn Dc. Moisés Shrestha M.D., F.A.C.S. Nicola A. Sanders Ormond, M.D., F.A.C.S. Garry Sutheralnd M.D., F.A.C.S., F.A.C.CJohn Dasilva M.D., F.A.C.SJohn Miranda M.D., F.A.C.S. Manisha Marcelino M.D., F.A.C.SJohn Jack. Liana Douglas M.D., F.A.C.SJohn Ortega M.D. Glenn Larois M.D., Meredith Morgan M.D. Primo Lehman M.D., F.A.C.SJohn Tijerina. Terrie Lopez M.D., F.A.C.S. Belkis Salas M.D. Ken Kelly M.D.  Kianna Jasso M.D. PhD.  Rc Jacobo M.D. Dilshad Halo, LUCY Hutton. Elver العراقي M.D. Latisha Judd M.D. Shannan Benson M.D. Shana Penny M.D.   Carolina Hawk D.O., F.A.C.S. Naseem Hurt M.D., F.A.C.S. Rosa Da Silva M.D. Welcome to [facility] As you contemplate possible surgical treatment, it is very important to us that you understand fully what is being discussed, that all of your questions have been answered, and that your options for treatment have been fully explained. To that end, on the following page we will ask you some questions to make certain that you understand everything which has been explained to you. Included in this understanding is that there are both surgical and nonsurgical treatments available for you, that you have options regarding where your care is given, and what doctors are involved in your care. Included in these options would, of course, be the option to elect for no treatment whatsoever.  We especially want to be sure that you have had a chance to have all of your questions and concerns answered. If there are any issues which have not been adequately addressed, we ask you to bring them forward so that we can thoroughly address them. A patient who is fully informed and understands their condition and options for treatment, as well as potential adverse effects of treatment, is going to be a patient who receives the most benefit out of care rendered. Our goal in addition to providing excellent surgical care is to provide the necessary information to you and your family in order to make decisions which are appropriate relative to your own care. Please take the time necessary to read and answer the questions on the next page. Again, if you have any questions, bring them forward and we will certainly address them. Sincerely,    [facility].    _______________________  ____________________________________  Date:                                             Patient Signature  ________________________  Zaira Benton  Witness Consent Form         Revised: 2015  Patient Name: Zaira Benton     : 1980                 Printed: 6/15/13 9:43 AM     Medical Record #: [id number]                Page: 1 of  2        [facility] Supplemental Consent Form    A [facility] surgeon has met with me and explained the matter of my illness, and what treatments might be available to improve my condition. As a result of that conversation, I understand the following:    A [facility] surgeon met with me and explained, in detail, the nature of my condition for which surgery is being contemplated. The procedure to be performed  Is: REMOVAL OF STERNAL HARDWARE            Yes _____ No _____    A [facility] surgeon has explained to me that there are alternatives to surgery which might include no surgery, medical therapy, or interventional treatment, among other options and the risks and benefits of the different treatment options:     Yes _____ No _____    A [facility] surgeon as explained to me that if I should so desire, he/she is willing to explain my case and the surgical and non-surgical options to family members: Yes _____ No _____    A [facility] surgeon has answered all of my questions regarding the topics we have discussed. I have been invited to ask more questions:  Yes _____ No _____    A [facility] surgeon has explained to me that if I seek other options or wish treatment at another facility in [place] or [place], or anywhere in the [place], that his/her office will assist me in making such accommodations:   Yes _____ No _____    A [facility] surgeon has explained to me, that death, risk of bleeding, stroke, multi-organ failure, heart attack or other complications are risks for the proposed surgical procedure: Yes _____ No _____    A [facility] surgeon has explained to me that I have the right to cancel or postpone the surgery at any time prior to the start of surgery: Yes _____ No _____    The nature and options for treatment for my condition have been explained to me, in detail, by a [facility] surgeon and all questions have been answered to my satisfaction. I understand that I am not required to undergo surgery, and further, that if I so desire, I could have surgery accomplished by another surgeon or at another institution. I understand and accept that which has been explained to me.  I am able to make my decisions knowingly and willfully based on the data.    ______________________   __________________________________  Date       Patient Signature  ______________________________ Misha Acevedon  Witness Consent Form   Patient Name: Misha Juan     DATB: 11/5/1980                 Medical Record #: [id number]    Page: 2 of 2        Printed: July 26, 2023

## (undated) NOTE — LETTER
[facility]  Phone: [phone]  Right Fax: [phone]  Isaura 20 Paul Santo RN Date: 2/22/21    Patient Name: Bubbadarnell Lopez  Surgery Date: 3/2/2021    CSN: [id number]  Medical Record: [id number]

## (undated) NOTE — ED AVS SNAPSHOT
Janelle Ren   MRN: [id number]    Department:  [facility] Emergency Department   Date of Visit:  1/11/2019           Disclosure     Insurance plans vary and the physician(s) referred by the ER may not be covered by your plan.  Please contact y tell this physician (or your personal doctor if your instructions are to return to your personal doctor) about any new or lasting problems. The primary care or specialist physician will see patients referred from the [facility] Emergency Department.  Jose L Romano

## (undated) NOTE — LETTER
[facility] [address], [address] [place] [address]  Consent for Procedure/Sedation  Date:         Time:     I hereby authorize Dr. Joey Warner, my physician and his/her assistants (if applicable), which may include medical students, residents, and/or fellows, to perform the following surgical operation/ procedure and administer such anesthesia as may be determined necessary by my physician:  Operation/Procedure name (s)  Cardiac Catheterization, Left Ventricular Cineangiography, Bilateral Selective Coronary Angiography and/or Right Heart Catheterization; possible Percutaneous Transluminal Coronary Angioplasty, Coronary Atherectomy, Coronary Stent, Intracoronary Thrombolytic therapy, Antiplatelet therapy and/or Intravascular Ultrasound on Burnette Sicard   2. I recognize that during the surgical operation/procedure, unforeseen conditions may necessitate additional or different procedures than those listed above. I, therefore, further authorize and request that the above-named surgeon, assistants, or designees perform such procedures as are, in their judgment, necessary and desirable. 3.   My surgeon/physician has discussed prior to my surgery the potential benefits, risks and side effects of this procedure; the likelihood of achieving goals; and potential problems that might occur during recuperation. They also discussed reasonable alternatives to the procedure, including risks, benefits, and side effects related to the alternatives and risks related to not receiving this procedure. I have had all my questions answered and I acknowledge that no guarantee has been made as to the result that may be obtained. 4.   Should the need arise during my operation/procedure, which includes change of level of care prior to discharge, I also consent to the administration of blood and/or blood products.   Further, I understand that despite careful testing and screening of blood or blood products by collecting agencies, I may still be subject to ill effects as a result of receiving a blood transfusion and/or blood products. The following are some, but not all, of the potential risks that can occur: fever and allergic reactions, hemolytic reactions, transmission of diseases such as Hepatitis, AIDS and Cytomegalovirus (CMV) and fluid overload. In the event that I wish to have an autologous transfusion of my own blood, or a directed donor transfusion, I will discuss this with my physician. Check only if Refusing Blood or Blood Products  I understand refusal of blood or blood products as deemed necessary by my physician may have serious consequences to my condition to include possible death. I hereby assume responsibility for my refusal and release the hospital, its personnel, and my physicians from any responsibility for the consequences of my refusal.          o  Refuse      5. I authorize the use of any specimen, organs, tissues, body parts or foreign objects that may be removed from my body during the operation/procedure for diagnosis, research or teaching purposes and their subsequent disposal by hospital authorities. I also authorize the release of specimen test results and/or written reports to my treating physician on the hospital medical staff or other referring or consulting physicians involved in my care, at the discretion of the Pathologist or my treating physician. 6.   I consent to the photographing or videotaping of the operations or procedures to be performed, including appropriate portions of my body for medical, scientific, or educational purposes, provided my identity is not revealed by the pictures or by descriptive texts accompanying them. If the procedure has been photographed/videotaped, the surgeon will obtain the original picture, image, videotape or CD.   The hospital will not be responsible for storage, release or maintenance of the picture, image, tape or CD.    7.   I consent to the presence of a  or observers in the operating room as deemed necessary by my physician or their designees. 8.   I recognize that in the event my procedure results in extended X-Ray/fluoroscopy time, I may develop a skin reaction. 9. If I have a Do Not Attempt Resuscitation (DNAR) order in place, that status will be suspended while in the operating room, procedural suite, and during the recovery period unless otherwise explicitly stated by me (or a person authorized to consent on my behalf). The surgeon or my attending physician will determine when the applicable recovery period ends for purposes of reinstating the DNAR order. 10. Patients having a sterilization procedure: I understand that if the procedure is successful the results will be permanent and it will therefore be impossible for me to inseminate, conceive, or bear children. I also understand that the procedure is intended to result in sterility, although the result has not been guaranteed. 11. I acknowledge that my physician has explained sedation/analgesia administration to me including the risk and benefits I consent to the administration of sedation/analgesia as may be necessary or desirable in the judgment of my physician.     I CERTIFY THAT I HAVE READ AND FULLY UNDERSTAND THE ABOVE CONSENT TO OPERATION and/or OTHER PROCEDURE.        ____________________________________       _________________________________      ______________________________  Signature of Patient         Signature of Responsible Person        Printed Name of Responsible Person    ____________________________________      _________________________________      ______________________________       Signature of Witness          Relationship to Patient                       Date                                       Time  Patient Name: Jane Gauthier     : 1980                 Printed: 2023      Medical Record #: [id number] Page 1 of 2